# Patient Record
Sex: MALE | Race: WHITE | NOT HISPANIC OR LATINO | Employment: OTHER | URBAN - METROPOLITAN AREA
[De-identification: names, ages, dates, MRNs, and addresses within clinical notes are randomized per-mention and may not be internally consistent; named-entity substitution may affect disease eponyms.]

---

## 2017-02-03 ENCOUNTER — ALLSCRIPTS OFFICE VISIT (OUTPATIENT)
Dept: OTHER | Facility: OTHER | Age: 62
End: 2017-02-03

## 2017-06-28 ENCOUNTER — TRANSCRIBE ORDERS (OUTPATIENT)
Dept: LAB | Facility: CLINIC | Age: 62
End: 2017-06-28

## 2017-06-28 ENCOUNTER — APPOINTMENT (OUTPATIENT)
Dept: LAB | Facility: CLINIC | Age: 62
End: 2017-06-28
Payer: COMMERCIAL

## 2017-06-28 DIAGNOSIS — E55.9 UNSPECIFIED VITAMIN D DEFICIENCY: ICD-10-CM

## 2017-06-28 DIAGNOSIS — Z79.899 LONG TERM USE OF DRUG: ICD-10-CM

## 2017-06-28 DIAGNOSIS — R63.5 WEIGHT GAIN: ICD-10-CM

## 2017-06-28 DIAGNOSIS — E78.5 HYPERLIPIDEMIA, UNSPECIFIED HYPERLIPIDEMIA TYPE: ICD-10-CM

## 2017-06-28 DIAGNOSIS — I15.9 SECONDARY HYPERTENSION: ICD-10-CM

## 2017-06-28 DIAGNOSIS — Z79.899 ENCOUNTER FOR LONG-TERM (CURRENT) USE OF OTHER MEDICATIONS: ICD-10-CM

## 2017-06-28 DIAGNOSIS — I15.9 SECONDARY HYPERTENSION: Primary | ICD-10-CM

## 2017-06-28 LAB
25(OH)D3 SERPL-MCNC: 33.1 NG/ML (ref 30–100)
ALBUMIN SERPL BCP-MCNC: 3.6 G/DL (ref 3.5–5)
ALP SERPL-CCNC: 92 U/L (ref 46–116)
ALT SERPL W P-5'-P-CCNC: 39 U/L (ref 12–78)
ANION GAP SERPL CALCULATED.3IONS-SCNC: 6 MMOL/L (ref 4–13)
AST SERPL W P-5'-P-CCNC: 20 U/L (ref 5–45)
BILIRUB SERPL-MCNC: 0.51 MG/DL (ref 0.2–1)
BUN SERPL-MCNC: 13 MG/DL (ref 5–25)
CALCIUM SERPL-MCNC: 8.8 MG/DL (ref 8.3–10.1)
CHLORIDE SERPL-SCNC: 111 MMOL/L (ref 100–108)
CHOLEST SERPL-MCNC: 127 MG/DL (ref 50–200)
CO2 SERPL-SCNC: 25 MMOL/L (ref 21–32)
CREAT SERPL-MCNC: 0.8 MG/DL (ref 0.6–1.3)
ERYTHROCYTE [DISTWIDTH] IN BLOOD BY AUTOMATED COUNT: 12.5 % (ref 11.6–15.1)
GFR SERPL CREATININE-BSD FRML MDRD: >60 ML/MIN/1.73SQ M
GLUCOSE P FAST SERPL-MCNC: 91 MG/DL (ref 65–99)
HCT VFR BLD AUTO: 46.2 % (ref 36.5–49.3)
HDLC SERPL-MCNC: 31 MG/DL (ref 40–60)
HGB BLD-MCNC: 16.2 G/DL (ref 12–17)
LDLC SERPL CALC-MCNC: 60 MG/DL (ref 0–100)
MCH RBC QN AUTO: 31 PG (ref 26.8–34.3)
MCHC RBC AUTO-ENTMCNC: 35.1 G/DL (ref 31.4–37.4)
MCV RBC AUTO: 89 FL (ref 82–98)
PLATELET # BLD AUTO: 184 THOUSANDS/UL (ref 149–390)
PMV BLD AUTO: 11.1 FL (ref 8.9–12.7)
POTASSIUM SERPL-SCNC: 3.8 MMOL/L (ref 3.5–5.3)
PROT SERPL-MCNC: 7 G/DL (ref 6.4–8.2)
RBC # BLD AUTO: 5.22 MILLION/UL (ref 3.88–5.62)
SODIUM SERPL-SCNC: 142 MMOL/L (ref 136–145)
T4 FREE SERPL-MCNC: 0.97 NG/DL (ref 0.76–1.46)
TRIGL SERPL-MCNC: 181 MG/DL
TSH SERPL DL<=0.05 MIU/L-ACNC: 2.46 UIU/ML (ref 0.36–3.74)
WBC # BLD AUTO: 6.73 THOUSAND/UL (ref 4.31–10.16)

## 2017-06-28 PROCEDURE — 80061 LIPID PANEL: CPT

## 2017-06-28 PROCEDURE — 84443 ASSAY THYROID STIM HORMONE: CPT

## 2017-06-28 PROCEDURE — 36415 COLL VENOUS BLD VENIPUNCTURE: CPT

## 2017-06-28 PROCEDURE — 85027 COMPLETE CBC AUTOMATED: CPT

## 2017-06-28 PROCEDURE — 80053 COMPREHEN METABOLIC PANEL: CPT

## 2017-06-28 PROCEDURE — 82306 VITAMIN D 25 HYDROXY: CPT

## 2017-06-28 PROCEDURE — 84439 ASSAY OF FREE THYROXINE: CPT

## 2018-01-13 VITALS
BODY MASS INDEX: 35.84 KG/M2 | HEART RATE: 84 BPM | WEIGHT: 256 LBS | DIASTOLIC BLOOD PRESSURE: 68 MMHG | RESPIRATION RATE: 16 BRPM | HEIGHT: 71 IN | SYSTOLIC BLOOD PRESSURE: 122 MMHG | TEMPERATURE: 98.1 F

## 2018-01-13 NOTE — PROGRESS NOTES
Assessment    1  Allergic rhinitis (477 9) (J30 9)   2  Encounter for preventive health examination (V70 0) (Z00 00)    Plan  Allergic rhinitis    · Montelukast Sodium 10 MG Oral Tablet (Singulair); TAKE 1 TABLET DAILY  Asthma    · Advair Diskus 250-50 MCG/DOSE Inhalation Aerosol Powder Breath Activated;  INHALE 1 PUFF TWICE DAILY  RINSE MOUTH AFTER USE   · Proventil  (90 Base) MCG/ACT Inhalation Aerosol Solution; INHALE 1 TO 2  PUFFS EVERY 4 TO 6 HOURS AS NEEDED    Discussion/Summary  Impression: health maintenance visit  [facility] physical and cleared to drive  Chief Complaint  Pt presents for [facility] Physical  Eye exam: both 20/20, left 20/20, right 20/30  History of Present Illness  HM, Adult Male: The patient is being seen for a health maintenance evaluation  The last health maintenance visit was 12 month(s) ago  General Health:   Screening:   HPI: The patient is here for a [facility] physical      Review of Systems    Constitutional: No fever or chills, feels well, no tiredness, no recent weight gain or weight loss  Eyes: No complaints of eye pain, no red eyes, no discharge from eyes, no itchy eyes  ENT: no complaints of earache, no hearing loss, no nosebleeds, no nasal discharge, no sore throat, no hoarseness  Cardiovascular: No complaints of slow heart rate, no fast heart rate, no chest pain, no palpitations, no leg claudication, no lower extremity  Respiratory: No complaints of shortness of breath, no wheezing, no cough, no SOB on exertion, no orthopnea or PND  Gastrointestinal: No complaints of abdominal pain, no constipation, no nausea or vomiting, no diarrhea or bloody stools  Genitourinary: No complaints of dysuria, no incontinence, no hesitancy, no nocturia, no genital lesion, no testicular pain  Musculoskeletal: No complaints of arthralgia, no myalgias, no joint swelling or stiffness, no limb pain or swelling     Integumentary: No complaints of skin rash or skin lesions, no itching, no skin wound, no dry skin  Neurological: No compliants of headache, no confusion, no convulsions, no numbness or tingling, no dizziness or fainting, no limb weakness, no difficulty walking  Psychiatric: Is not suicidal, no sleep disturbances, no anxiety or depression, no change in personality, no emotional problems  Endocrine: No complaints of proptosis, no hot flashes, no muscle weakness, no erectile dysfunction, no deepening of the voice, no feelings of weakness  Hematologic/Lymphatic: No complaints of swollen glands, no swollen glands in the neck, does not bleed easily, no easy bruising  Active Problems    1  Asthma (493 90) (J45 909)   2  Essential hypertension (401 9) (I10)   3  Heart disease (429 9) (I51 9)   4  Hypercholesteremia (272 0) (E78 00)    Past Medical History    · History of Heart attack (410 90) (I21 3)    Surgical History    · History of Coronary Artery Triple Arterial Bypass Graft   · History of Knee Replacement   · History of Umbilical Hernia Repair    Family History  Mother    · No pertinent family history    Social History    · Never a smoker    Current Meds   1  Aspirin 81 MG TABS; TAKE 1 TABLET DAILY; Therapy: [id number] to Recorded   2  Atorvastatin Calcium 40 MG Oral Tablet; TAKE 1 TABLET DAILY AS DIRECTED; Therapy: [id number] to Recorded   3  Metoprolol Succinate ER 25 MG Oral Tablet Extended Release 24 Hour; Therapy: [id number] to (Evaluate:18Mar2015) Recorded    Allergies    1  Biaxin TABS   2  Penicillins   3  Sulfa Drugs    Vitals   Recorded: [id number] 01:47PM   Temperature 98 1 F   Heart Rate 84   Respiration 16   Systolic 985   Diastolic 68   Height 5 ft 11 in   Weight 256 lb    BMI Calculated 35 7   BSA Calculated 2 34     Physical Exam    Constitutional   General appearance: No acute distress, well appearing and well nourished  Head and Face   Head and face: Normal     Eyes   Conjunctiva and lids: No erythema, swelling or discharge      Ears, Nose, Mouth, and Throat   External inspection of ears and nose: Normal     Otoscopic examination: Tympanic membranes translucent with normal light reflex  Canals patent without erythema  Hearing: Normal     Nasal mucosa, septum, and turbinates: Normal without edema or erythema  Oropharynx: Normal with no erythema, edema, exudate or lesions  Neck   Neck: Supple, symmetric, trachea midline, no masses  Thyroid: Normal, no thyromegaly  Pulmonary   Respiratory effort: No increased work of breathing or signs of respiratory distress  Auscultation of lungs: Clear to auscultation  Cardiovascular   Auscultation of heart: Normal rate and rhythm, normal S1 and S2, no murmurs  Carotid pulses: 2+ bilaterally  Abdominal aorta: Normal     Femoral pulses: 2+ bilaterally  Pedal pulses: 2+ bilaterally  Examination of extremities for edema and/or varicosities: Normal     Chest   Breasts: Normal, no dimpling or skin changes appreciated  Abdomen   Abdomen: Non-tender, no masses  Liver and spleen: No hepatomegaly or splenomegaly  Stool sample for occult blood: Negative  Genitourinary   Scrotal contents: Normal testes, no masses  Lymphatic   Palpation of lymph nodes in neck: No lymphadenopathy  Musculoskeletal   Gait and station: Normal     Inspection/palpation of digits and nails: Normal without clubbing or cyanosis  Inspection/palpation of joints, bones, and muscles: Normal     Muscle strength/tone: Normal     Skin   Skin and subcutaneous tissue: Normal without rashes or lesions  Palpation of skin and subcutaneous tissue: Normal turgor  Neurologic   Cranial nerves: Cranial nerves 2-12 intact  Reflexes: 2+ and symmetric  Sensation: No sensory loss      Psychiatric   Judgment and insight: Normal     Mood and affect: Normal        Signatures   Electronically signed by : Maria E Weiss DO; Feb  3 2017  2:15PM EST                       (Author)

## 2018-10-25 ENCOUNTER — APPOINTMENT (OUTPATIENT)
Dept: LAB | Facility: HOSPITAL | Age: 63
End: 2018-10-25
Attending: INTERNAL MEDICINE
Payer: COMMERCIAL

## 2018-10-25 ENCOUNTER — TRANSCRIBE ORDERS (OUTPATIENT)
Dept: ADMINISTRATIVE | Facility: HOSPITAL | Age: 63
End: 2018-10-25

## 2018-10-25 DIAGNOSIS — R68.89 RECENT CHANGE IN WEIGHT: ICD-10-CM

## 2018-10-25 DIAGNOSIS — R73.9 HYPERGLYCEMIA: ICD-10-CM

## 2018-10-25 DIAGNOSIS — Z79.82 ASPIRIN LONG-TERM USE: ICD-10-CM

## 2018-10-25 DIAGNOSIS — Z79.899 LONG TERM USE OF DRUG: ICD-10-CM

## 2018-10-25 DIAGNOSIS — I10 ESSENTIAL HYPERTENSION: ICD-10-CM

## 2018-10-25 DIAGNOSIS — I10 ESSENTIAL HYPERTENSION: Primary | ICD-10-CM

## 2018-10-25 LAB
ALBUMIN SERPL BCP-MCNC: 4.2 G/DL (ref 3.5–5.7)
ALP SERPL-CCNC: 90 U/L (ref 55–165)
ALT SERPL W P-5'-P-CCNC: 38 U/L (ref 7–52)
ANION GAP SERPL CALCULATED.3IONS-SCNC: 9 MMOL/L (ref 4–13)
AST SERPL W P-5'-P-CCNC: 22 U/L (ref 13–39)
BILIRUB SERPL-MCNC: 0.8 MG/DL (ref 0.2–1)
BUN SERPL-MCNC: 13 MG/DL (ref 7–25)
CALCIUM SERPL-MCNC: 9.2 MG/DL (ref 8.6–10.5)
CHLORIDE SERPL-SCNC: 107 MMOL/L (ref 98–107)
CHOLEST SERPL-MCNC: 107 MG/DL (ref 0–200)
CO2 SERPL-SCNC: 23 MMOL/L (ref 21–31)
CREAT SERPL-MCNC: 0.83 MG/DL (ref 0.7–1.3)
ERYTHROCYTE [DISTWIDTH] IN BLOOD BY AUTOMATED COUNT: 14.3 % (ref 11.6–15.1)
EST. AVERAGE GLUCOSE BLD GHB EST-MCNC: 103 MG/DL
GFR SERPL CREATININE-BSD FRML MDRD: 93 ML/MIN/1.73SQ M
GLUCOSE P FAST SERPL-MCNC: 108 MG/DL (ref 65–99)
HBA1C MFR BLD: 5.2 % (ref 4.2–6.3)
HCT VFR BLD AUTO: 47.6 % (ref 42–52)
HDLC SERPL-MCNC: 36 MG/DL (ref 40–60)
HGB BLD-MCNC: 16.2 G/DL (ref 12–17)
LDLC SERPL CALC-MCNC: 41 MG/DL (ref 0–100)
MCH RBC QN AUTO: 31.1 PG (ref 26.8–34.3)
MCHC RBC AUTO-ENTMCNC: 34.1 G/DL (ref 31.4–37.4)
MCV RBC AUTO: 91 FL (ref 82–98)
NONHDLC SERPL-MCNC: 71 MG/DL
PLATELET # BLD AUTO: 191 THOUSANDS/UL (ref 149–390)
PMV BLD AUTO: 8.5 FL (ref 8.9–12.7)
POTASSIUM SERPL-SCNC: 3.9 MMOL/L (ref 3.5–5.5)
PROT SERPL-MCNC: 7.1 G/DL (ref 6.4–8.9)
RBC # BLD AUTO: 5.22 MILLION/UL (ref 3.88–5.62)
SODIUM SERPL-SCNC: 139 MMOL/L (ref 134–143)
T4 FREE SERPL-MCNC: 1 NG/DL (ref 0.76–1.46)
TRIGL SERPL-MCNC: 151 MG/DL (ref 44–166)
TSH SERPL DL<=0.05 MIU/L-ACNC: 3.05 UIU/ML (ref 0.45–5.33)
WBC # BLD AUTO: 7.2 THOUSAND/UL (ref 4.31–10.16)

## 2018-10-25 PROCEDURE — 85027 COMPLETE CBC AUTOMATED: CPT

## 2018-10-25 PROCEDURE — 84443 ASSAY THYROID STIM HORMONE: CPT

## 2018-10-25 PROCEDURE — 80061 LIPID PANEL: CPT

## 2018-10-25 PROCEDURE — 36415 COLL VENOUS BLD VENIPUNCTURE: CPT

## 2018-10-25 PROCEDURE — 83036 HEMOGLOBIN GLYCOSYLATED A1C: CPT

## 2018-10-25 PROCEDURE — 80053 COMPREHEN METABOLIC PANEL: CPT

## 2018-10-25 PROCEDURE — 84439 ASSAY OF FREE THYROXINE: CPT

## 2020-01-23 ENCOUNTER — APPOINTMENT (OUTPATIENT)
Dept: LAB | Facility: HOSPITAL | Age: 65
End: 2020-01-23
Attending: INTERNAL MEDICINE
Payer: MEDICARE

## 2020-01-23 ENCOUNTER — TRANSCRIBE ORDERS (OUTPATIENT)
Dept: ADMINISTRATIVE | Facility: HOSPITAL | Age: 65
End: 2020-01-23

## 2020-01-23 DIAGNOSIS — E55.9 VITAMIN D DEFICIENCY: ICD-10-CM

## 2020-01-23 DIAGNOSIS — Z79.01 LONG TERM (CURRENT) USE OF ANTICOAGULANTS: ICD-10-CM

## 2020-01-23 DIAGNOSIS — I10 ESSENTIAL HYPERTENSION: Primary | ICD-10-CM

## 2020-01-23 DIAGNOSIS — R10.9 ABDOMINAL PAIN, UNSPECIFIED ABDOMINAL LOCATION: ICD-10-CM

## 2020-01-23 DIAGNOSIS — R73.9 HYPERGLYCEMIA: ICD-10-CM

## 2020-01-23 DIAGNOSIS — I25.10 ASCVD (ARTERIOSCLEROTIC CARDIOVASCULAR DISEASE): ICD-10-CM

## 2020-01-23 DIAGNOSIS — E78.2 MIXED HYPERLIPIDEMIA: ICD-10-CM

## 2020-01-23 DIAGNOSIS — N42.9 PROSTATE DISORDER: ICD-10-CM

## 2020-01-23 DIAGNOSIS — I10 ESSENTIAL HYPERTENSION: ICD-10-CM

## 2020-01-23 LAB
25(OH)D3 SERPL-MCNC: 26.7 NG/ML (ref 30–100)
ALBUMIN SERPL BCP-MCNC: 3.6 G/DL (ref 3.5–5)
ALP SERPL-CCNC: 87 U/L (ref 46–116)
ALT SERPL W P-5'-P-CCNC: 31 U/L (ref 12–78)
ANION GAP SERPL CALCULATED.3IONS-SCNC: 4 MMOL/L (ref 4–13)
AST SERPL W P-5'-P-CCNC: 17 U/L (ref 5–45)
BACTERIA UR QL AUTO: ABNORMAL /HPF
BASOPHILS # BLD AUTO: 0.07 THOUSANDS/ΜL (ref 0–0.1)
BASOPHILS NFR BLD AUTO: 1 % (ref 0–1)
BILIRUB SERPL-MCNC: 0.72 MG/DL (ref 0.2–1)
BILIRUB UR QL STRIP: NEGATIVE
BUN SERPL-MCNC: 8 MG/DL (ref 5–25)
CALCIUM SERPL-MCNC: 9.6 MG/DL (ref 8.3–10.1)
CHLORIDE SERPL-SCNC: 107 MMOL/L (ref 100–108)
CHOLEST SERPL-MCNC: 226 MG/DL (ref 50–200)
CLARITY UR: CLEAR
CO2 SERPL-SCNC: 27 MMOL/L (ref 21–32)
COLOR UR: ABNORMAL
CREAT SERPL-MCNC: 1.02 MG/DL (ref 0.6–1.3)
EOSINOPHIL # BLD AUTO: 0.2 THOUSAND/ΜL (ref 0–0.61)
EOSINOPHIL NFR BLD AUTO: 3 % (ref 0–6)
ERYTHROCYTE [DISTWIDTH] IN BLOOD BY AUTOMATED COUNT: 12.9 % (ref 11.6–15.1)
EST. AVERAGE GLUCOSE BLD GHB EST-MCNC: 111 MG/DL
GFR SERPL CREATININE-BSD FRML MDRD: 77 ML/MIN/1.73SQ M
GLUCOSE P FAST SERPL-MCNC: 103 MG/DL (ref 65–99)
GLUCOSE UR STRIP-MCNC: NEGATIVE MG/DL
HBA1C MFR BLD: 5.5 % (ref 4.2–6.3)
HCT VFR BLD AUTO: 49.3 % (ref 36.5–49.3)
HDLC SERPL-MCNC: 36 MG/DL
HGB BLD-MCNC: 16.4 G/DL (ref 12–17)
HGB UR QL STRIP.AUTO: NEGATIVE
HYALINE CASTS #/AREA URNS LPF: ABNORMAL /LPF
IMM GRANULOCYTES # BLD AUTO: 0.02 THOUSAND/UL (ref 0–0.2)
IMM GRANULOCYTES NFR BLD AUTO: 0 % (ref 0–2)
KETONES UR STRIP-MCNC: NEGATIVE MG/DL
LDLC SERPL CALC-MCNC: 142 MG/DL (ref 0–100)
LEUKOCYTE ESTERASE UR QL STRIP: NEGATIVE
LIPASE SERPL-CCNC: 114 U/L (ref 73–393)
LYMPHOCYTES # BLD AUTO: 1.61 THOUSANDS/ΜL (ref 0.6–4.47)
LYMPHOCYTES NFR BLD AUTO: 26 % (ref 14–44)
MCH RBC QN AUTO: 29.9 PG (ref 26.8–34.3)
MCHC RBC AUTO-ENTMCNC: 33.3 G/DL (ref 31.4–37.4)
MCV RBC AUTO: 90 FL (ref 82–98)
MONOCYTES # BLD AUTO: 0.75 THOUSAND/ΜL (ref 0.17–1.22)
MONOCYTES NFR BLD AUTO: 12 % (ref 4–12)
NEUTROPHILS # BLD AUTO: 3.64 THOUSANDS/ΜL (ref 1.85–7.62)
NEUTS SEG NFR BLD AUTO: 58 % (ref 43–75)
NITRITE UR QL STRIP: NEGATIVE
NON-SQ EPI CELLS URNS QL MICRO: ABNORMAL /HPF
NONHDLC SERPL-MCNC: 190 MG/DL
NRBC BLD AUTO-RTO: 0 /100 WBCS
PH UR STRIP.AUTO: 6.5 [PH]
PLATELET # BLD AUTO: 194 THOUSANDS/UL (ref 149–390)
PMV BLD AUTO: 10 FL (ref 8.9–12.7)
POTASSIUM SERPL-SCNC: 4.1 MMOL/L (ref 3.5–5.3)
PROT SERPL-MCNC: 7.7 G/DL (ref 6.4–8.2)
PROT UR STRIP-MCNC: ABNORMAL MG/DL
PSA SERPL-MCNC: 1.5 NG/ML (ref 0–4)
RBC # BLD AUTO: 5.48 MILLION/UL (ref 3.88–5.62)
RBC #/AREA URNS AUTO: ABNORMAL /HPF
SODIUM SERPL-SCNC: 138 MMOL/L (ref 136–145)
SP GR UR STRIP.AUTO: 1.02 (ref 1–1.03)
T4 FREE SERPL-MCNC: 0.99 NG/DL (ref 0.76–1.46)
TRIGL SERPL-MCNC: 238 MG/DL
TSH SERPL DL<=0.05 MIU/L-ACNC: 5.45 UIU/ML (ref 0.36–3.74)
UROBILINOGEN UR QL STRIP.AUTO: 0.2 E.U./DL
WBC # BLD AUTO: 6.29 THOUSAND/UL (ref 4.31–10.16)
WBC #/AREA URNS AUTO: ABNORMAL /HPF

## 2020-01-23 PROCEDURE — 84439 ASSAY OF FREE THYROXINE: CPT

## 2020-01-23 PROCEDURE — G0103 PSA SCREENING: HCPCS

## 2020-01-23 PROCEDURE — 85025 COMPLETE CBC W/AUTO DIFF WBC: CPT

## 2020-01-23 PROCEDURE — 83690 ASSAY OF LIPASE: CPT

## 2020-01-23 PROCEDURE — 81001 URINALYSIS AUTO W/SCOPE: CPT | Performed by: INTERNAL MEDICINE

## 2020-01-23 PROCEDURE — 82306 VITAMIN D 25 HYDROXY: CPT

## 2020-01-23 PROCEDURE — 84443 ASSAY THYROID STIM HORMONE: CPT

## 2020-01-23 PROCEDURE — 80061 LIPID PANEL: CPT

## 2020-01-23 PROCEDURE — 87086 URINE CULTURE/COLONY COUNT: CPT

## 2020-01-23 PROCEDURE — 36415 COLL VENOUS BLD VENIPUNCTURE: CPT

## 2020-01-23 PROCEDURE — 83036 HEMOGLOBIN GLYCOSYLATED A1C: CPT

## 2020-01-23 PROCEDURE — 80053 COMPREHEN METABOLIC PANEL: CPT

## 2020-01-24 ENCOUNTER — TRANSCRIBE ORDERS (OUTPATIENT)
Dept: ADMINISTRATIVE | Facility: HOSPITAL | Age: 65
End: 2020-01-24

## 2020-01-24 DIAGNOSIS — R10.9 ABDOMINAL PAIN, UNSPECIFIED ABDOMINAL LOCATION: ICD-10-CM

## 2020-01-24 DIAGNOSIS — R14.0 BLOATING: Primary | ICD-10-CM

## 2020-01-24 LAB — BACTERIA UR CULT: NORMAL

## 2020-01-30 ENCOUNTER — HOSPITAL ENCOUNTER (OUTPATIENT)
Dept: CT IMAGING | Facility: HOSPITAL | Age: 65
Discharge: HOME | End: 2020-01-30
Attending: INTERNAL MEDICINE
Payer: MEDICARE

## 2020-01-30 DIAGNOSIS — R10.9 ABDOMINAL PAIN, UNSPECIFIED ABDOMINAL LOCATION: ICD-10-CM

## 2020-01-30 DIAGNOSIS — R14.0 BLOATING: ICD-10-CM

## 2020-01-30 PROCEDURE — 74177 CT ABD & PELVIS W/CONTRAST: CPT

## 2020-01-30 RX ADMIN — IOHEXOL 100 ML: 350 INJECTION, SOLUTION INTRAVENOUS at 07:51

## 2020-03-12 ENCOUNTER — APPOINTMENT (OUTPATIENT)
Dept: LAB | Facility: CLINIC | Age: 65
End: 2020-03-12
Payer: MEDICARE

## 2020-03-12 ENCOUNTER — TRANSCRIBE ORDERS (OUTPATIENT)
Dept: URGENT CARE | Facility: CLINIC | Age: 65
End: 2020-03-12

## 2020-03-12 DIAGNOSIS — E55.9 VITAMIN D DEFICIENCY: ICD-10-CM

## 2020-03-12 DIAGNOSIS — E03.9 HYPOTHYROIDISM, UNSPECIFIED TYPE: Primary | ICD-10-CM

## 2020-03-12 DIAGNOSIS — E03.9 HYPOTHYROIDISM, UNSPECIFIED TYPE: ICD-10-CM

## 2020-03-12 LAB
25(OH)D3 SERPL-MCNC: 43.7 NG/ML (ref 30–100)
T4 FREE SERPL-MCNC: 1.2 NG/DL (ref 0.76–1.46)
TSH SERPL DL<=0.05 MIU/L-ACNC: 3.53 UIU/ML (ref 0.36–3.74)

## 2020-03-12 PROCEDURE — 82306 VITAMIN D 25 HYDROXY: CPT

## 2020-03-12 PROCEDURE — 84443 ASSAY THYROID STIM HORMONE: CPT

## 2020-03-12 PROCEDURE — 84439 ASSAY OF FREE THYROXINE: CPT

## 2020-03-12 PROCEDURE — 36415 COLL VENOUS BLD VENIPUNCTURE: CPT

## 2020-05-05 ENCOUNTER — TRANSCRIBE ORDERS (OUTPATIENT)
Dept: ADMINISTRATIVE | Facility: HOSPITAL | Age: 65
End: 2020-05-05

## 2020-05-05 DIAGNOSIS — R10.9 ABDOMINAL PAIN, UNSPECIFIED ABDOMINAL LOCATION: Primary | ICD-10-CM

## 2020-05-14 ENCOUNTER — HOSPITAL ENCOUNTER (OUTPATIENT)
Dept: MRI IMAGING | Facility: HOSPITAL | Age: 65
Discharge: HOME | End: 2020-05-14
Attending: INTERNAL MEDICINE
Payer: MEDICARE

## 2020-05-14 ENCOUNTER — HOSPITAL ENCOUNTER (OUTPATIENT)
Dept: MRI IMAGING | Facility: HOSPITAL | Age: 65
Discharge: HOME | End: 2020-05-14
Payer: MEDICARE

## 2020-05-14 DIAGNOSIS — R10.9 ABDOMINAL PAIN, UNSPECIFIED ABDOMINAL LOCATION: ICD-10-CM

## 2020-05-14 DIAGNOSIS — IMO0001 ASYMMETRICAL HEARING LOSS OF LEFT EAR: ICD-10-CM

## 2020-05-14 PROCEDURE — 74183 MRI ABD W/O CNTR FLWD CNTR: CPT

## 2020-05-14 PROCEDURE — A9585 GADOBUTROL INJECTION: HCPCS | Performed by: INTERNAL MEDICINE

## 2020-05-14 RX ADMIN — GADOBUTROL 11 ML: 604.72 INJECTION INTRAVENOUS at 15:28

## 2020-05-15 ENCOUNTER — HOSPITAL ENCOUNTER (OUTPATIENT)
Dept: MRI IMAGING | Facility: HOSPITAL | Age: 65
Discharge: HOME | End: 2020-05-15
Payer: MEDICARE

## 2020-05-15 PROCEDURE — 70553 MRI BRAIN STEM W/O & W/DYE: CPT

## 2020-05-15 PROCEDURE — A9585 GADOBUTROL INJECTION: HCPCS | Performed by: SPECIALIST

## 2020-05-15 RX ADMIN — GADOBUTROL 11 ML: 604.72 INJECTION INTRAVENOUS at 16:01

## 2020-06-12 ENCOUNTER — DOCUMENTATION (OUTPATIENT)
Dept: OTHER | Facility: HOSPITAL | Age: 65
End: 2020-06-12

## 2020-06-12 DIAGNOSIS — Z20.822 ENCOUNTER FOR LABORATORY TESTING FOR SEVERE ACUTE RESPIRATORY SYNDROME CORONAVIRUS 2 (SARS-COV-2): Primary | ICD-10-CM

## 2020-06-19 DIAGNOSIS — Z20.822 ENCOUNTER FOR LABORATORY TESTING FOR SEVERE ACUTE RESPIRATORY SYNDROME CORONAVIRUS 2 (SARS-COV-2): ICD-10-CM

## 2020-06-19 PROCEDURE — U0003 INFECTIOUS AGENT DETECTION BY NUCLEIC ACID (DNA OR RNA); SEVERE ACUTE RESPIRATORY SYNDROME CORONAVIRUS 2 (SARS-COV-2) (CORONAVIRUS DISEASE [COVID-19]), AMPLIFIED PROBE TECHNIQUE, MAKING USE OF HIGH THROUGHPUT TECHNOLOGIES AS DESCRIBED BY CMS-2020-01-R: HCPCS

## 2020-06-22 LAB — SARS-COV-2 RNA SPEC QL NAA+PROBE: NOT DETECTED

## 2020-06-24 ENCOUNTER — ANESTHESIA EVENT (OUTPATIENT)
Dept: GASTROENTEROLOGY | Facility: HOSPITAL | Age: 65
End: 2020-06-24

## 2020-06-25 ENCOUNTER — HOSPITAL ENCOUNTER (OUTPATIENT)
Dept: GASTROENTEROLOGY | Facility: HOSPITAL | Age: 65
Setting detail: OUTPATIENT SURGERY
Discharge: HOME | End: 2020-06-25
Attending: INTERNAL MEDICINE | Admitting: INTERNAL MEDICINE
Payer: MEDICARE

## 2020-06-25 ENCOUNTER — ANESTHESIA (OUTPATIENT)
Dept: GASTROENTEROLOGY | Facility: HOSPITAL | Age: 65
End: 2020-06-25

## 2020-06-25 VITALS
RESPIRATION RATE: 18 BRPM | SYSTOLIC BLOOD PRESSURE: 112 MMHG | TEMPERATURE: 98.1 F | HEART RATE: 85 BPM | DIASTOLIC BLOOD PRESSURE: 58 MMHG | OXYGEN SATURATION: 97 %

## 2020-06-25 DIAGNOSIS — R19.4 CHANGE IN BOWEL HABIT: ICD-10-CM

## 2020-06-25 DIAGNOSIS — R10.84 GENERALIZED ABDOMINAL PAIN: ICD-10-CM

## 2020-06-25 PROCEDURE — 88305 TISSUE EXAM BY PATHOLOGIST: CPT | Performed by: PATHOLOGY

## 2020-06-25 RX ORDER — LIDOCAINE HYDROCHLORIDE 10 MG/ML
0.5 INJECTION, SOLUTION EPIDURAL; INFILTRATION; INTRACAUDAL; PERINEURAL ONCE AS NEEDED
Status: DISCONTINUED | OUTPATIENT
Start: 2020-06-25 | End: 2020-06-29 | Stop reason: HOSPADM

## 2020-06-25 RX ORDER — SODIUM CHLORIDE, SODIUM LACTATE, POTASSIUM CHLORIDE, CALCIUM CHLORIDE 600; 310; 30; 20 MG/100ML; MG/100ML; MG/100ML; MG/100ML
125 INJECTION, SOLUTION INTRAVENOUS CONTINUOUS
Status: DISCONTINUED | OUTPATIENT
Start: 2020-06-25 | End: 2020-06-29 | Stop reason: HOSPADM

## 2020-06-25 RX ORDER — SODIUM CHLORIDE, SODIUM LACTATE, POTASSIUM CHLORIDE, CALCIUM CHLORIDE 600; 310; 30; 20 MG/100ML; MG/100ML; MG/100ML; MG/100ML
125 INJECTION, SOLUTION INTRAVENOUS CONTINUOUS
Status: CANCELLED | OUTPATIENT
Start: 2020-06-25

## 2020-06-25 RX ORDER — EPHEDRINE SULFATE 50 MG/ML
INJECTION INTRAVENOUS AS NEEDED
Status: DISCONTINUED | OUTPATIENT
Start: 2020-06-25 | End: 2020-06-25 | Stop reason: SURG

## 2020-06-25 RX ORDER — LIDOCAINE HYDROCHLORIDE 20 MG/ML
INJECTION, SOLUTION EPIDURAL; INFILTRATION; INTRACAUDAL; PERINEURAL AS NEEDED
Status: DISCONTINUED | OUTPATIENT
Start: 2020-06-25 | End: 2020-06-25 | Stop reason: SURG

## 2020-06-25 RX ORDER — PROPOFOL 10 MG/ML
INJECTION, EMULSION INTRAVENOUS AS NEEDED
Status: DISCONTINUED | OUTPATIENT
Start: 2020-06-25 | End: 2020-06-25 | Stop reason: SURG

## 2020-06-25 RX ADMIN — PROPOFOL 30 MG: 10 INJECTION, EMULSION INTRAVENOUS at 10:38

## 2020-06-25 RX ADMIN — PROPOFOL 30 MG: 10 INJECTION, EMULSION INTRAVENOUS at 10:45

## 2020-06-25 RX ADMIN — PROPOFOL 30 MG: 10 INJECTION, EMULSION INTRAVENOUS at 10:53

## 2020-06-25 RX ADMIN — PROPOFOL 30 MG: 10 INJECTION, EMULSION INTRAVENOUS at 10:50

## 2020-06-25 RX ADMIN — EPHEDRINE SULFATE 10 MG: 50 INJECTION, SOLUTION INTRAVENOUS at 10:52

## 2020-06-25 RX ADMIN — SODIUM CHLORIDE, SODIUM LACTATE, POTASSIUM CHLORIDE, AND CALCIUM CHLORIDE 125 ML/HR: .6; .31; .03; .02 INJECTION, SOLUTION INTRAVENOUS at 08:34

## 2020-06-25 RX ADMIN — PROPOFOL 30 MG: 10 INJECTION, EMULSION INTRAVENOUS at 11:06

## 2020-06-25 RX ADMIN — PROPOFOL 30 MG: 10 INJECTION, EMULSION INTRAVENOUS at 10:57

## 2020-06-25 RX ADMIN — PROPOFOL 30 MG: 10 INJECTION, EMULSION INTRAVENOUS at 10:41

## 2020-06-25 RX ADMIN — PROPOFOL 30 MG: 10 INJECTION, EMULSION INTRAVENOUS at 11:01

## 2020-06-25 RX ADMIN — LIDOCAINE HYDROCHLORIDE 100 MG: 20 INJECTION, SOLUTION EPIDURAL; INFILTRATION; INTRACAUDAL; PERINEURAL at 10:35

## 2020-06-25 RX ADMIN — PROPOFOL 130 MG: 10 INJECTION, EMULSION INTRAVENOUS at 10:35

## 2020-06-25 RX ADMIN — PROPOFOL 30 MG: 10 INJECTION, EMULSION INTRAVENOUS at 10:48

## 2020-06-25 RX ADMIN — PROPOFOL 30 MG: 10 INJECTION, EMULSION INTRAVENOUS at 11:04

## 2020-06-25 RX ADMIN — PROPOFOL 30 MG: 10 INJECTION, EMULSION INTRAVENOUS at 10:43

## 2020-09-10 ENCOUNTER — ANESTHESIA EVENT (OUTPATIENT)
Dept: PERIOP | Facility: AMBULARY SURGERY CENTER | Age: 65
End: 2020-09-10
Payer: MEDICARE

## 2020-09-16 ENCOUNTER — TRANSCRIBE ORDERS (OUTPATIENT)
Dept: URGENT CARE | Facility: CLINIC | Age: 65
End: 2020-09-16

## 2020-09-16 ENCOUNTER — APPOINTMENT (OUTPATIENT)
Dept: RADIOLOGY | Facility: CLINIC | Age: 65
End: 2020-09-16
Payer: MEDICARE

## 2020-09-16 ENCOUNTER — LAB (OUTPATIENT)
Dept: LAB | Facility: CLINIC | Age: 65
End: 2020-09-16
Payer: MEDICARE

## 2020-09-16 DIAGNOSIS — G47.33 OBSTRUCTIVE SLEEP APNEA (ADULT) (PEDIATRIC): ICD-10-CM

## 2020-09-16 DIAGNOSIS — G47.33 OSA (OBSTRUCTIVE SLEEP APNEA): ICD-10-CM

## 2020-09-16 DIAGNOSIS — G47.33 OBSTRUCTIVE SLEEP APNEA (ADULT) (PEDIATRIC): Primary | ICD-10-CM

## 2020-09-16 LAB
ANION GAP SERPL CALCULATED.3IONS-SCNC: 11 MMOL/L (ref 4–13)
BUN SERPL-MCNC: 13 MG/DL (ref 5–25)
CALCIUM SERPL-MCNC: 9.3 MG/DL (ref 8.3–10.1)
CHLORIDE SERPL-SCNC: 107 MMOL/L (ref 100–108)
CO2 SERPL-SCNC: 21 MMOL/L (ref 21–32)
CREAT SERPL-MCNC: 1 MG/DL (ref 0.6–1.3)
ERYTHROCYTE [DISTWIDTH] IN BLOOD BY AUTOMATED COUNT: 13.2 % (ref 11.6–15.1)
GFR SERPL CREATININE-BSD FRML MDRD: 79 ML/MIN/1.73SQ M
GLUCOSE P FAST SERPL-MCNC: 81 MG/DL (ref 65–99)
HCT VFR BLD AUTO: 50 % (ref 36.5–49.3)
HGB BLD-MCNC: 17.3 G/DL (ref 12–17)
MCH RBC QN AUTO: 31.2 PG (ref 26.8–34.3)
MCHC RBC AUTO-ENTMCNC: 34.6 G/DL (ref 31.4–37.4)
MCV RBC AUTO: 90 FL (ref 82–98)
PLATELET # BLD AUTO: 222 THOUSANDS/UL (ref 149–390)
PMV BLD AUTO: 10 FL (ref 8.9–12.7)
POTASSIUM SERPL-SCNC: 4.2 MMOL/L (ref 3.5–5.3)
RBC # BLD AUTO: 5.55 MILLION/UL (ref 3.88–5.62)
SODIUM SERPL-SCNC: 139 MMOL/L (ref 136–145)
WBC # BLD AUTO: 7.81 THOUSAND/UL (ref 4.31–10.16)

## 2020-09-16 PROCEDURE — 36415 COLL VENOUS BLD VENIPUNCTURE: CPT

## 2020-09-16 PROCEDURE — 85027 COMPLETE CBC AUTOMATED: CPT

## 2020-09-16 PROCEDURE — 71046 X-RAY EXAM CHEST 2 VIEWS: CPT

## 2020-09-16 PROCEDURE — 80048 BASIC METABOLIC PNL TOTAL CA: CPT

## 2020-09-23 PROBLEM — IMO0001 SMOKING: Status: ACTIVE | Noted: 2020-09-23

## 2020-09-23 PROBLEM — I21.9 MI (MYOCARDIAL INFARCTION) (HCC): Status: ACTIVE | Noted: 2020-09-23

## 2020-09-23 PROBLEM — F17.200 SMOKING: Status: ACTIVE | Noted: 2020-09-23

## 2020-09-23 PROBLEM — G47.30 SLEEP APNEA: Status: ACTIVE | Noted: 2020-09-23

## 2020-09-23 PROBLEM — J45.909 ASTHMA: Status: ACTIVE | Noted: 2020-09-23

## 2020-09-23 NOTE — PRE-PROCEDURE INSTRUCTIONS
Pre-Surgery Instructions:   Medication Instructions    albuterol (PROVENTIL HFA) 90 mcg/act inhaler Instructed patient per Anesthesia Guidelines   aspirin 81 MG tablet Patient was instructed by Physician and understands   clopidogrel (PLAVIX) 75 mg tablet Patient was instructed by Physician and understands   fluticasone-salmeterol (ADVAIR DISKUS) 250-50 mcg/dose inhaler Instructed patient per Anesthesia Guidelines   metoprolol succinate (TOPROL XL) 25 mg 24 hr tablet Instructed patient per Anesthesia Guidelines   montelukast (SINGULAIR) 10 mg tablet Instructed patient per Anesthesia Guidelines   Multiple Vitamins-Minerals (VITAMIN D3 COMPLETE PO) Instructed patient per Anesthesia Guidelines   nitroglycerin (NITROSTAT) 0 4 mg SL tablet Instructed patient per Anesthesia Guidelines   OMEGA-3 FATTY ACIDS PO Instructed patient per Anesthesia Guidelines      Pre op,medications and showering instructions reviewed-Patient has hibiclens

## 2020-09-24 ENCOUNTER — HOSPITAL ENCOUNTER (OUTPATIENT)
Facility: AMBULARY SURGERY CENTER | Age: 65
Setting detail: OUTPATIENT SURGERY
Discharge: HOME | End: 2020-09-24
Attending: OTOLARYNGOLOGY | Admitting: OTOLARYNGOLOGY
Payer: MEDICARE

## 2020-09-24 ENCOUNTER — ANESTHESIA (OUTPATIENT)
Dept: PERIOP | Facility: AMBULARY SURGERY CENTER | Age: 65
End: 2020-09-24
Payer: MEDICARE

## 2020-09-24 VITALS
SYSTOLIC BLOOD PRESSURE: 142 MMHG | RESPIRATION RATE: 21 BRPM | HEIGHT: 71 IN | TEMPERATURE: 97.6 F | BODY MASS INDEX: 33.88 KG/M2 | OXYGEN SATURATION: 95 % | HEART RATE: 80 BPM | WEIGHT: 242 LBS | DIASTOLIC BLOOD PRESSURE: 85 MMHG

## 2020-09-24 VITALS — HEART RATE: 83 BPM

## 2020-09-24 PROBLEM — Z98.61 HISTORY OF PTCA: Status: ACTIVE | Noted: 2020-09-24

## 2020-09-24 PROBLEM — Z95.1 HISTORY OF CORONARY ARTERY BYPASS GRAFT: Status: ACTIVE | Noted: 2020-09-24

## 2020-09-24 PROCEDURE — 31575 DIAGNOSTIC LARYNGOSCOPY: CPT | Performed by: OTOLARYNGOLOGY

## 2020-09-24 RX ORDER — PROPOFOL 10 MG/ML
INJECTION, EMULSION INTRAVENOUS AS NEEDED
Status: DISCONTINUED | OUTPATIENT
Start: 2020-09-24 | End: 2020-09-24

## 2020-09-24 RX ORDER — PROPOFOL 10 MG/ML
INJECTION, EMULSION INTRAVENOUS CONTINUOUS PRN
Status: DISCONTINUED | OUTPATIENT
Start: 2020-09-24 | End: 2020-09-24

## 2020-09-24 RX ORDER — ATROPINE SULFATE 0.4 MG/ML
AMPUL (ML) INJECTION AS NEEDED
Status: DISCONTINUED | OUTPATIENT
Start: 2020-09-24 | End: 2020-09-24

## 2020-09-24 RX ORDER — OXYMETAZOLINE HYDROCHLORIDE 0.05 G/100ML
SPRAY NASAL AS NEEDED
Status: DISCONTINUED | OUTPATIENT
Start: 2020-09-24 | End: 2020-09-24 | Stop reason: HOSPADM

## 2020-09-24 RX ORDER — LIDOCAINE HYDROCHLORIDE 10 MG/ML
0.5 INJECTION, SOLUTION EPIDURAL; INFILTRATION; INTRACAUDAL; PERINEURAL ONCE AS NEEDED
Status: DISCONTINUED | OUTPATIENT
Start: 2020-09-24 | End: 2020-09-24 | Stop reason: HOSPADM

## 2020-09-24 RX ORDER — GLYCOPYRROLATE 0.2 MG/ML
INJECTION INTRAMUSCULAR; INTRAVENOUS AS NEEDED
Status: DISCONTINUED | OUTPATIENT
Start: 2020-09-24 | End: 2020-09-24

## 2020-09-24 RX ORDER — SODIUM CHLORIDE, SODIUM LACTATE, POTASSIUM CHLORIDE, CALCIUM CHLORIDE 600; 310; 30; 20 MG/100ML; MG/100ML; MG/100ML; MG/100ML
125 INJECTION, SOLUTION INTRAVENOUS CONTINUOUS
Status: DISCONTINUED | OUTPATIENT
Start: 2020-09-24 | End: 2020-09-24 | Stop reason: HOSPADM

## 2020-09-24 RX ADMIN — PROPOFOL 20 MG: 10 INJECTION, EMULSION INTRAVENOUS at 09:37

## 2020-09-24 RX ADMIN — GLYCOPYRROLATE 0.2 MG: 0.2 INJECTION, SOLUTION INTRAMUSCULAR; INTRAVENOUS at 09:20

## 2020-09-24 RX ADMIN — SODIUM CHLORIDE, SODIUM LACTATE, POTASSIUM CHLORIDE, AND CALCIUM CHLORIDE 125 ML/HR: .6; .31; .03; .02 INJECTION, SOLUTION INTRAVENOUS at 08:31

## 2020-09-24 RX ADMIN — SODIUM CHLORIDE, SODIUM LACTATE, POTASSIUM CHLORIDE, AND CALCIUM CHLORIDE: .6; .31; .03; .02 INJECTION, SOLUTION INTRAVENOUS at 09:38

## 2020-09-24 RX ADMIN — Medication 0.2 MG: at 09:20

## 2020-09-24 RX ADMIN — PROPOFOL 100 MCG/KG/MIN: 10 INJECTION, EMULSION INTRAVENOUS at 09:37

## 2020-09-24 NOTE — ANESTHESIA PREPROCEDURE EVALUATION
Procedure:  DRUG INDUCED SLEEP ENDOSCOPY (N/A Throat)    Relevant Problems   CARDIO   (+) History of PTCA (2018 ONE STENT)   (+) History of coronary artery bypass graft (2012)   (+) MI (myocardial infarction) (HCC)      PULMONARY   (+) Asthma   (+) Sleep apnea   (+) Smoking (FORMER)        Physical Exam    Airway    Mallampati score: II  TM Distance: >3 FB  Neck ROM: full     Dental   Comment: ONE UPPER IMPLANT,     Cardiovascular      Pulmonary  Breath sounds clear to auscultation,     Other Findings        Anesthesia Plan  ASA Score- 3     Anesthesia Type- IV sedation with anesthesia with ASA Monitors  Additional Monitors:   Airway Plan:           Plan Factors-Exercise tolerance (METS): >4 METS  Chart reviewed  Patient is not a current smoker  Patient not instructed to abstain from smoking on day of procedure  Patient did not smoke on day of surgery  Induction- intravenous  Postoperative Plan-     Informed Consent- Anesthetic plan and risks discussed with patient  I personally reviewed this patient with the CRNA  Discussed and agreed on the Anesthesia Plan with the CRNA  Henok Ruff

## 2020-09-24 NOTE — DISCHARGE INSTRUCTIONS
Drug Induced Sleep Endoscopy     WHAT YOU SHOULD KNOW:   During a drug induced sleep endsocopy (DISE), your caregiver places a scope into your nose to see inside your nose and throat  You may need a DISE to find the cause of your sleep apnea  DISE helps your caregiver diagnose your condition and create a treatment plan  You might also have surgery or other treatments during a DISE  AFTER YOU LEAVE:     Follow up with your primary healthcare provider or specialist as directed:  Write down your questions so you remember to ask them during your visits  Medicines:   · Keep a current list of your medicines:  Include the amounts, and when, how, and why you take them  Take the list or the pill bottles to follow-up visits  Carry your medicine list with you in case of an emergency  Throw away old medicine lists  Use vitamins, herbs, or food supplements only as directed  · Take your medicine as directed:  Call your healthcare provider if you think your medicine is not working as expected  Tell him about any medicine allergies, and if you want to quit taking or change your medicine  Nutrition:  Most people eat and drink as usual after a laryngoscopy  Ask your primary healthcare provider if you are not sure  Contact your primary healthcare provider if:  · You have problems breathing or talking  · You see new injuries to your teeth, lips, or tongue  · Your pain does not go away or gets worse  · You have questions about your procedure, medicine, or care  If you have any questions or concerns during business hours please call our office at [phone]   After hours please call the surgeon on call or Dr Herbie Prince at [phone]

## 2020-09-24 NOTE — H&P
Consultation - Otolaryngology - Head and Neck Surgery  Facial Plastic and Reconstructive Surgery  Erline Felty 76 y o  male MRN: [id number]  Encounter: [id number]        Assessment/Plan: Unchanged from previous    Obstructive sleep apnea: We discussed the nature of obstructive sleep apnea  We discussed the natural history of sleep apnea  We discussed options for management  We discussed non-surgical management including weight loss, mandibular advancement devices, and positive airway pressure therapy including various options  We discussed that he is not tolerating his CPAP and has at least moderate MILDRED with an unknown AHI listed as severe MILDRED and BMI of 35, he would like to move forward with Drug Induced Sleep Endoscopy to evaluate the source of the obstructions  We will plan for DISE and if necessary repeat sleep study if one has not been performed within 3 years  If surgical treatable causes of sleep apnea are seen such as tongue base laxity, we will consider options for surgical treatment  After the procedure we will further discuss surgical and non-surgical options, if necessary, at that time  History of Present Illness   Physician Requesting Consult: Laurence Meek MD  Reason for Consult / Principal Problem: sleep apnea  HPI: Erline Felty is a 76y o  year old male who presents with History of obstructive sleep apnea  Sleep study 2 years ago and has filed a CPAP multiple times since that time  He notes ongoing difficulty in tolerating the mask  He is unable to maintain a seal frequently  He has tried multiple masks  No previous airway evaluation  No previous head or neck surgery  No neck masses  Non-smoker  No significant weight gain or loss recently  Presenting preop for his DISE today    Review of systems:  [address] was performed and negative except as above or otherwise noted in the medical record      Historical Information   Past Medical History:   Diagnosis Date    Allergic rhinitis  Asthma     [facility] [address] [facility]     Hypertension     Myocardial infarction ([facility] [address] [facility]     2012    Sleep apnea     does not use C-PAP     Past Surgical History:   Procedure Laterality Date    CORONARY ARTERY BYPASS GRAFT  2012    2 stents   2018    / 2012 3 vessel bypass    REPLACEMENT TOTAL KNEE BILATERAL      ROTATOR CUFF REPAIR       Social History   Social History     Substance and Sexual Activity   Alcohol Use Not Currently     Social History     Substance and Sexual Activity   Drug Use Never     Social History     Tobacco Use   Smoking Status Former Smoker    Packs/day: 0 50    Years: 6 00    Pack years: 3 00    Types: Cigarettes    Last attempt to quit:     Years since quittin 7   Smokeless Tobacco Former User     Family History:   Family History   Problem Relation Age of Onset    Cancer Brother     Melanoma Brother        No current facility-administered medications on file prior to encounter  Current Outpatient Medications on File Prior to Encounter   Medication Sig    albuterol (PROVENTIL HFA) 90 mcg/act inhaler Inhale 1-2 puffs    aspirin 81 MG tablet Take 1 tablet by mouth daily    clopidogrel (PLAVIX) 75 mg tablet clopidogrel 75 mg tablet    fluticasone-salmeterol (ADVAIR DISKUS) 250-50 mcg/dose inhaler Inhale 1 puff 2 (two) times a day    metoprolol succinate (TOPROL XL) 25 mg 24 hr tablet Toprol XL 25 mg tablet,extended release    montelukast (SINGULAIR) 10 mg tablet montelukast 10 mg tablet    Multiple Vitamins-Minerals (VITAMIN D3 COMPLETE PO) Daily    nitroglycerin (NITROSTAT) 0 4 mg SL tablet nitroglycerin 0 4 mg sublingual tablet   PLACE 1 TABLET (0 4 MG) BY SUBLINGUAL ROUTE EVERY 5 MINUTES AS NEEDEDFOR CHEST PAIN   DO NOT EXCEED 3 DOSES IN 15 MINUTES     OMEGA-3 FATTY ACIDS PO 360mg       Allergies   Allergen Reactions    Sulfa Antibiotics     Clarithromycin Rash    Penicillins Hives and Rash       Vitals:    20 0822   BP: 126/81   Pulse: 82 Resp: 18   Temp: (!) 97 2 °F (36 2 °C)   SpO2: 95%       Physical Exam   Constitutional: Oriented to person, place, and time  Well-developed and well-nourished, no apparent distress, non-toxic appearance  Cooperative, able to hear and answer questions without difficulty  Voice: Normal voice quality  Head: Normocephalic, atraumatic  No scars, masses or lesions  Face: Symmetric, no edema, no sinus tenderness  Eyes: Vision grossly intact, extra-ocular movement intact  Ears: External ears normal  Tympanic membranes intact with intact normal landmarks  No post-auricular erythema or tenderness  Nose: Septum intact, nares clear  Mucosa moist, turbinates well appearing  No crusting, polyps or discharge evident  Oral cavity: Dentition intact  Mucosa moist, lips without lesions or masses  Tongue mobile, floor of mouth soft and flat  Hard palate intact  No masses or lesions  Oropharynx: Uvula is midline, soft palate intact without lesion or mass  Oropharyngeal inlet without obstruction  Tonsils unremarkable  Posterior pharyngeal wall clear  No masses or lesions  Salivary glands:  Parotid glands and submandibular glands symmetric, no enlargement or tenderness  Neck: Normal laryngeal elevation with swallow  Trachea midline  No masses or lesions  No palpable adenopathy  Thyroid: Without tenderness or palpable nodules  Pulmonary/Chest: Normal effort and rate  No respiratory distress  No stertor or stridor  Musculoskeletal: Normal range of motion  Neurological: Cranial nerves 2-12 intact  Skin: Skin is warm and dry  Psychiatric: Normal mood and affect  CTAB  RRR      Imaging Studies: I have personally reviewed pertinent reports  Lab Results: I have personally reviewed pertinent lab results  Greater than 40 minutes were spent in consultation  More than 1/2 of that time was spent in counselling and coordination of care

## 2020-09-24 NOTE — ANESTHESIA POSTPROCEDURE EVALUATION
Post-Op Assessment Note    CV Status:  Stable  Pain Score: 0    Pain management: adequate     Mental Status:  Alert and awake   Hydration Status:  Euvolemic   PONV Controlled:  Controlled   Airway Patency:  Patent      Post Op Vitals Reviewed: Yes      Staff: Anesthesiologist, CRNA         No complications documented      BP (P) 135/80 (09/24/20 0950)    Temp (!) (P) 97 4 °F (36 3 °C) (09/24/20 0950)    Pulse (P) 81 (09/24/20 0950)   Resp (P) 16 (09/24/20 0950)    SpO2 (P) 97 % (09/24/20 0950)

## 2020-09-24 NOTE — OP NOTE
OPERATIVE REPORT  PATIENT NAME: Tennille Underwood    :  1955  MRN: [id number]  Pt Location: [facility] OR [facility] 05    SURGERY DATE: 2020    Surgeon(s) and Role:     * Johann Chicas MD - Primary    Preop Diagnosis:  MILDRED (obstructive sleep apnea) [G47 33]    Post-Op Diagnosis Codes:     * MILDRED (obstructive sleep apnea) [G47 33]    Procedure(s) (LRB):  DRUG INDUCED SLEEP ENDOSCOPY (N/A)    Specimen(s):  * No specimens in log *    Estimated Blood Loss:   Minimal    Drains:  * No LDAs found *    Anesthesia Type:   General    Operative Indications:  MILDRED (obstructive sleep apnea) [G47 33]    Operative Findings:  V 3 complete AP  O 3 complete AP  T 3 complete AP  E 3 complete AP    Complications:   None    Procedure and Technique:    PREOPERATIVE DIAGNOSES: Obstructive sleep apnea with positive pressure   intolerance and persistent sleep apnea after CPAP trial    POSTOPERATIVE DIAGNOSES: Same    PROCEDURES: Drug-induced sleep endoscopy (flexible fiberoptic laryngoscopy   with examination under anesthesia)  ANESTHESIA: IV sedation  ESTIMATED BLOOD LOSS: None  COMPLICATIONS: None  INDICATIONS: Tennille Underwood is a 76y o  year-old male with a history of symptomatic obstructive sleep apnea, who is intolerant and unable to achieve benefit with positive pressure therapy  He presents today for drug-induced sleep endoscopy to better characterize her locations and pattern of obstruction and to predict appropriate medical and/or surgical options moving forward  FINDINGS: There was no evidence of complete concentric palatal obstruction and he does appear to be a candidate anatomically for hypoglossal nerve   stimulation therapy  DESCRIPTION OF PROCEDURE: Tennille Underwood was brought to the operating room and was anesthetized via the standard drug-induced sleep endoscopy protocol  The propofol infusion rate was startedand gradually increased until conditions that mimic sleep were gradually observed       With the patient not responsive to verbal commands, but still with spontaneous respiration, sleep disordered breathing events and associated desaturations were clearly observed    Under these conditions, the flexible endoscope was inserted to examine both sides of the nose as well as the pharynx and larynx  The VOTE score at baseline was V: 3 complete AP; O: 3 complete AP; T: 3 complete AP; E: 3 complete AP  With simulated jaw advancement and tongue advancement, the hypopharyngeal obstruction and secondarily the palatal collapse also improved  In summary, there was no evidence of complete concentric palatal obstruction and he does appear to be a candidate anatomically for hypoglossal nerve stimulation therapy  The patient was then allowed to awaken while monitoring by anesthesia was performed until he was awake and able to maintain his own saturations  The patient was taken to the recovery area in stable condition  All instruments and sponge counts were correct at the end of the procedure  Anibal Johnson MD, was present for and performed all key elements of the procedure           I was present for the entire procedure and A qualified resident physician was not available    Patient Disposition:  PACU  and hemodynamically stable    SIGNATURE: Ana María Moore MD  DATE: September 24, 2020  TIME: 9:36 AM

## 2020-09-30 ENCOUNTER — TRANSCRIBE ORDERS (OUTPATIENT)
Dept: ADMINISTRATIVE | Facility: HOSPITAL | Age: 65
End: 2020-09-30

## 2020-09-30 DIAGNOSIS — M54.40 BACK PAIN OF LUMBAR REGION WITH SCIATICA: Primary | ICD-10-CM

## 2020-10-05 ENCOUNTER — HOSPITAL ENCOUNTER (OUTPATIENT)
Dept: MRI IMAGING | Facility: HOSPITAL | Age: 65
Discharge: HOME | End: 2020-10-05
Attending: INTERNAL MEDICINE
Payer: MEDICARE

## 2020-10-05 ENCOUNTER — HOSPITAL ENCOUNTER (OUTPATIENT)
Dept: RADIOLOGY | Facility: HOSPITAL | Age: 65
Discharge: HOME | End: 2020-10-05
Payer: MEDICARE

## 2020-10-05 DIAGNOSIS — M54.40 BACK PAIN OF LUMBAR REGION WITH SCIATICA: ICD-10-CM

## 2020-10-05 DIAGNOSIS — R52 PAIN: ICD-10-CM

## 2020-10-05 PROCEDURE — G1004 CDSM NDSC: HCPCS

## 2020-10-05 PROCEDURE — 72148 MRI LUMBAR SPINE W/O DYE: CPT

## 2020-10-05 PROCEDURE — 72110 X-RAY EXAM L-2 SPINE 4/>VWS: CPT

## 2020-10-15 ENCOUNTER — APPOINTMENT (OUTPATIENT)
Dept: RADIOLOGY | Facility: CLINIC | Age: 65
End: 2020-10-15
Payer: MEDICARE

## 2020-10-15 ENCOUNTER — OFFICE VISIT (OUTPATIENT)
Dept: URGENT CARE | Facility: CLINIC | Age: 65
End: 2020-10-15
Payer: MEDICARE

## 2020-10-15 VITALS
HEIGHT: 71 IN | HEART RATE: 80 BPM | RESPIRATION RATE: 18 BRPM | BODY MASS INDEX: 34.3 KG/M2 | DIASTOLIC BLOOD PRESSURE: 77 MMHG | TEMPERATURE: 97.7 F | OXYGEN SATURATION: 97 % | WEIGHT: 245 LBS | SYSTOLIC BLOOD PRESSURE: 128 MMHG

## 2020-10-15 DIAGNOSIS — M25.521 RIGHT ELBOW PAIN: ICD-10-CM

## 2020-10-15 DIAGNOSIS — L03.113 CELLULITIS OF RIGHT ELBOW: Primary | ICD-10-CM

## 2020-10-15 PROCEDURE — 99213 OFFICE O/P EST LOW 20 MIN: CPT | Performed by: NURSE PRACTITIONER

## 2020-10-15 PROCEDURE — G0463 HOSPITAL OUTPT CLINIC VISIT: HCPCS | Performed by: NURSE PRACTITIONER

## 2020-10-15 PROCEDURE — 73080 X-RAY EXAM OF ELBOW: CPT

## 2020-10-15 RX ORDER — DOXYCYCLINE 100 MG/1
100 TABLET ORAL 2 TIMES DAILY
Qty: 14 TABLET | Refills: 0 | Status: SHIPPED | OUTPATIENT
Start: 2020-10-15 | End: 2020-10-22

## 2020-12-04 ENCOUNTER — APPOINTMENT (OUTPATIENT)
Dept: RADIOLOGY | Facility: CLINIC | Age: 65
End: 2020-12-04
Payer: MEDICARE

## 2020-12-04 ENCOUNTER — TRANSCRIBE ORDERS (OUTPATIENT)
Dept: URGENT CARE | Facility: CLINIC | Age: 65
End: 2020-12-04

## 2020-12-04 DIAGNOSIS — R06.02 SOB (SHORTNESS OF BREATH): Primary | ICD-10-CM

## 2020-12-04 DIAGNOSIS — G47.33 OBSTRUCTIVE SLEEP APNEA: ICD-10-CM

## 2020-12-04 DIAGNOSIS — R06.02 SOB (SHORTNESS OF BREATH): ICD-10-CM

## 2020-12-04 PROCEDURE — 71046 X-RAY EXAM CHEST 2 VIEWS: CPT

## 2020-12-04 PROCEDURE — U0003 INFECTIOUS AGENT DETECTION BY NUCLEIC ACID (DNA OR RNA); SEVERE ACUTE RESPIRATORY SYNDROME CORONAVIRUS 2 (SARS-COV-2) (CORONAVIRUS DISEASE [COVID-19]), AMPLIFIED PROBE TECHNIQUE, MAKING USE OF HIGH THROUGHPUT TECHNOLOGIES AS DESCRIBED BY CMS-2020-01-R: HCPCS | Performed by: OTOLARYNGOLOGY

## 2020-12-05 LAB — SARS-COV-2 RNA SPEC QL NAA+PROBE: DETECTED

## 2021-01-04 ENCOUNTER — APPOINTMENT (OUTPATIENT)
Dept: RADIOLOGY | Facility: CLINIC | Age: 66
End: 2021-01-04
Payer: MEDICARE

## 2021-01-04 ENCOUNTER — TRANSCRIBE ORDERS (OUTPATIENT)
Dept: URGENT CARE | Facility: CLINIC | Age: 66
End: 2021-01-04

## 2021-01-04 DIAGNOSIS — R05.9 COUGH: ICD-10-CM

## 2021-01-04 DIAGNOSIS — R06.2 WHEEZING: ICD-10-CM

## 2021-01-04 DIAGNOSIS — R05.9 COUGH: Primary | ICD-10-CM

## 2021-01-04 PROCEDURE — 71046 X-RAY EXAM CHEST 2 VIEWS: CPT

## 2021-03-10 DIAGNOSIS — Z23 ENCOUNTER FOR IMMUNIZATION: ICD-10-CM

## 2021-03-19 ENCOUNTER — IMMUNIZATIONS (OUTPATIENT)
Dept: FAMILY MEDICINE CLINIC | Facility: HOSPITAL | Age: 66
End: 2021-03-19

## 2021-03-19 DIAGNOSIS — Z23 ENCOUNTER FOR IMMUNIZATION: Primary | ICD-10-CM

## 2021-03-19 PROCEDURE — 0011A SARS-COV-2 / COVID-19 MRNA VACCINE (MODERNA) 100 MCG: CPT

## 2021-03-19 PROCEDURE — 91301 SARS-COV-2 / COVID-19 MRNA VACCINE (MODERNA) 100 MCG: CPT

## 2021-04-21 ENCOUNTER — IMMUNIZATIONS (OUTPATIENT)
Dept: FAMILY MEDICINE CLINIC | Facility: HOSPITAL | Age: 66
End: 2021-04-21

## 2021-04-21 DIAGNOSIS — Z23 ENCOUNTER FOR IMMUNIZATION: Primary | ICD-10-CM

## 2021-04-21 PROCEDURE — 0012A SARS-COV-2 / COVID-19 MRNA VACCINE (MODERNA) 100 MCG: CPT

## 2021-04-21 PROCEDURE — 91301 SARS-COV-2 / COVID-19 MRNA VACCINE (MODERNA) 100 MCG: CPT

## 2021-07-27 ENCOUNTER — APPOINTMENT (OUTPATIENT)
Dept: LAB | Facility: CLINIC | Age: 66
End: 2021-07-27
Payer: MEDICARE

## 2021-07-27 ENCOUNTER — CLINICAL SUPPORT (OUTPATIENT)
Dept: URGENT CARE | Facility: CLINIC | Age: 66
End: 2021-07-27
Payer: MEDICARE

## 2021-07-27 DIAGNOSIS — N52.9 ERECTILE DYSFUNCTION, UNSPECIFIED ERECTILE DYSFUNCTION TYPE: ICD-10-CM

## 2021-07-27 DIAGNOSIS — I10 ESSENTIAL HYPERTENSION: ICD-10-CM

## 2021-07-27 DIAGNOSIS — R73.9 HYPERGLYCEMIA: ICD-10-CM

## 2021-07-27 DIAGNOSIS — N42.9 DISEASE OF PROSTATE: ICD-10-CM

## 2021-07-27 DIAGNOSIS — Z79.899 ENCOUNTER FOR LONG-TERM (CURRENT) USE OF OTHER MEDICATIONS: ICD-10-CM

## 2021-07-27 DIAGNOSIS — G47.33 OBSTRUCTIVE SLEEP APNEA: ICD-10-CM

## 2021-07-27 DIAGNOSIS — E78.2 MIXED HYPERLIPIDEMIA: ICD-10-CM

## 2021-07-27 DIAGNOSIS — R63.5 WEIGHT GAIN: ICD-10-CM

## 2021-07-27 DIAGNOSIS — R94.31 ABNORMAL EKG: Primary | ICD-10-CM

## 2021-07-27 LAB
25(OH)D3 SERPL-MCNC: 40.9 NG/ML (ref 30–100)
ALBUMIN SERPL BCP-MCNC: 3.6 G/DL (ref 3.5–5)
ALP SERPL-CCNC: 84 U/L (ref 46–116)
ALT SERPL W P-5'-P-CCNC: 33 U/L (ref 12–78)
ANION GAP SERPL CALCULATED.3IONS-SCNC: 7 MMOL/L (ref 4–13)
AST SERPL W P-5'-P-CCNC: 20 U/L (ref 5–45)
BASOPHILS # BLD AUTO: 0.09 THOUSANDS/ΜL (ref 0–0.1)
BASOPHILS NFR BLD AUTO: 1 % (ref 0–1)
BILIRUB SERPL-MCNC: 0.91 MG/DL (ref 0.2–1)
BUN SERPL-MCNC: 16 MG/DL (ref 5–25)
CALCIUM SERPL-MCNC: 8.9 MG/DL (ref 8.3–10.1)
CHLORIDE SERPL-SCNC: 107 MMOL/L (ref 100–108)
CHOLEST SERPL-MCNC: 121 MG/DL (ref 50–200)
CO2 SERPL-SCNC: 24 MMOL/L (ref 21–32)
CREAT SERPL-MCNC: 1.05 MG/DL (ref 0.6–1.3)
EOSINOPHIL # BLD AUTO: 0.23 THOUSAND/ΜL (ref 0–0.61)
EOSINOPHIL NFR BLD AUTO: 3 % (ref 0–6)
ERYTHROCYTE [DISTWIDTH] IN BLOOD BY AUTOMATED COUNT: 13.5 % (ref 11.6–15.1)
EST. AVERAGE GLUCOSE BLD GHB EST-MCNC: 111 MG/DL
GFR SERPL CREATININE-BSD FRML MDRD: 74 ML/MIN/1.73SQ M
GLUCOSE P FAST SERPL-MCNC: 96 MG/DL (ref 65–99)
HBA1C MFR BLD: 5.5 %
HCT VFR BLD AUTO: 47.6 % (ref 36.5–49.3)
HDLC SERPL-MCNC: 46 MG/DL
HGB BLD-MCNC: 16.2 G/DL (ref 12–17)
IMM GRANULOCYTES # BLD AUTO: 0.02 THOUSAND/UL (ref 0–0.2)
IMM GRANULOCYTES NFR BLD AUTO: 0 % (ref 0–2)
LDLC SERPL CALC-MCNC: 42 MG/DL (ref 0–100)
LYMPHOCYTES # BLD AUTO: 2.11 THOUSANDS/ΜL (ref 0.6–4.47)
LYMPHOCYTES NFR BLD AUTO: 23 % (ref 14–44)
MCH RBC QN AUTO: 30.7 PG (ref 26.8–34.3)
MCHC RBC AUTO-ENTMCNC: 34 G/DL (ref 31.4–37.4)
MCV RBC AUTO: 90 FL (ref 82–98)
MONOCYTES # BLD AUTO: 1.04 THOUSAND/ΜL (ref 0.17–1.22)
MONOCYTES NFR BLD AUTO: 12 % (ref 4–12)
NEUTROPHILS # BLD AUTO: 5.59 THOUSANDS/ΜL (ref 1.85–7.62)
NEUTS SEG NFR BLD AUTO: 61 % (ref 43–75)
NONHDLC SERPL-MCNC: 75 MG/DL
NRBC BLD AUTO-RTO: 0 /100 WBCS
NT-PROBNP SERPL-MCNC: 83 PG/ML
PLATELET # BLD AUTO: 192 THOUSANDS/UL (ref 149–390)
PMV BLD AUTO: 10.5 FL (ref 8.9–12.7)
POTASSIUM SERPL-SCNC: 3.8 MMOL/L (ref 3.5–5.3)
PROT SERPL-MCNC: 6.8 G/DL (ref 6.4–8.2)
PSA SERPL-MCNC: 2.2 NG/ML (ref 0–4)
RBC # BLD AUTO: 5.27 MILLION/UL (ref 3.88–5.62)
SODIUM SERPL-SCNC: 138 MMOL/L (ref 136–145)
T4 FREE SERPL-MCNC: 1.05 NG/DL (ref 0.76–1.46)
TESTOST SERPL-MCNC: 302 NG/DL (ref 95–948)
TRIGL SERPL-MCNC: 167 MG/DL
TSH SERPL DL<=0.05 MIU/L-ACNC: 4.77 UIU/ML (ref 0.36–3.74)
WBC # BLD AUTO: 9.08 THOUSAND/UL (ref 4.31–10.16)

## 2021-07-27 PROCEDURE — 85025 COMPLETE CBC W/AUTO DIFF WBC: CPT

## 2021-07-27 PROCEDURE — 83036 HEMOGLOBIN GLYCOSYLATED A1C: CPT

## 2021-07-27 PROCEDURE — 80053 COMPREHEN METABOLIC PANEL: CPT

## 2021-07-27 PROCEDURE — 80061 LIPID PANEL: CPT

## 2021-07-27 PROCEDURE — 93005 ELECTROCARDIOGRAM TRACING: CPT | Performed by: FAMILY MEDICINE

## 2021-07-27 PROCEDURE — 83880 ASSAY OF NATRIURETIC PEPTIDE: CPT

## 2021-07-27 PROCEDURE — G0103 PSA SCREENING: HCPCS

## 2021-07-27 PROCEDURE — 84443 ASSAY THYROID STIM HORMONE: CPT

## 2021-07-27 PROCEDURE — 84403 ASSAY OF TOTAL TESTOSTERONE: CPT

## 2021-07-27 PROCEDURE — 82306 VITAMIN D 25 HYDROXY: CPT

## 2021-07-27 PROCEDURE — 84439 ASSAY OF FREE THYROXINE: CPT

## 2021-07-27 PROCEDURE — 36415 COLL VENOUS BLD VENIPUNCTURE: CPT

## 2021-07-30 LAB
ATRIAL RATE: 76 BPM
P AXIS: 54 DEGREES
PR INTERVAL: 144 MS
QRS AXIS: -10 DEGREES
QRSD INTERVAL: 102 MS
QT INTERVAL: 408 MS
QTC INTERVAL: 459 MS
T WAVE AXIS: 75 DEGREES
VENTRICULAR RATE: 76 BPM

## 2021-07-30 PROCEDURE — 93010 ELECTROCARDIOGRAM REPORT: CPT | Performed by: INTERNAL MEDICINE

## 2021-08-31 ENCOUNTER — ANESTHESIA EVENT (OUTPATIENT)
Dept: PERIOP | Facility: HOSPITAL | Age: 66
End: 2021-08-31
Payer: MEDICARE

## 2021-08-31 NOTE — PRE-PROCEDURE INSTRUCTIONS
Pre-Surgery Instructions:   Medication Instructions    albuterol (PROVENTIL HFA) 90 mcg/act inhaler Use DOS    aspirin 81 MG tablet Do not hold - take as directed    clopidogrel (PLAVIX) 75 mg tablet Hold 5 days prior to surgery     fenofibrate (TRICOR) 54 MG tablet Take as directed    fluticasone-salmeterol (ADVAIR DISKUS) 250-50 mcg/dose inhaler Use DOS    levothyroxine 50 mcg tablet Take as directed    metoprolol succinate (TOPROL XL) 25 mg 24 hr tablet Take as directed    mometasone (ELOCON) 0 1 % lotion Hold DOS    montelukast (SINGULAIR) 10 mg tablet Take as directed    Multiple Vitamins-Minerals (VITAMIN D3 COMPLETE PO) Hold for one week prior to surgery    nitroglycerin (NITROSTAT) 0 4 mg SL tablet PRN    OMEGA-3 FATTY ACIDS PO Hold for one week prior to surgery    omeprazole (PriLOSEC) 20 mg delayed release capsule Take as directed    predniSONE 5 mg tablet Take as directed    tiotropium (SPIRIVA) 18 mcg inhalation capsule Take as directed included DOS     My Surgical Experience    The following information was developed to assist you to prepare for your operation  What do I need to do before coming to the hospital?   Arrange for a responsible person to drive you to and from the hospital    Arrange care for your children at home  Children are not allowed in the recovery areas of the hospital   Plan to wear clothing that is easy to put on and take off  If you are having shoulder surgery, wear a shirt that buttons or zippers in the front  Bathing  o Shower the evening before and the morning of your surgery with an antibacterial soap  Please refer to the Pre Op Showering Instructions for Surgery Patients Sheet   o Remove nail polish and all body piercing jewelry  o Do not shave any body part for at least 24 hours before surgery-this includes face, arms, legs and upper body  Food  o Nothing to eat or drink after midnight the night before your surgery   This includes candy and chewing gum  o Exception: If your surgery is after 12:00pm (noon), you may have clear liquids such as 7-Up®, ginger ale, apple or cranberry juice, Jell-O®, water, or clear broth until 8:00 am  o Do not drink milk or juice with pulp on the morning before surgery  o Do not drink alcohol 24 hours before surgery  Medicine  o Follow instructions you received from your surgeon about which medicines you may take on the day of surgery  o If instructed to take medicine on the morning of surgery, take pills with just a small sip of water  Call your prescribing doctor for specific infroamtion on what to do if you take insulin    What should I bring to the hospital?    Bring:  Trin[place] Saeid or a walker, if you have them, for foot or knee surgery   A list of the daily medicines, vitamins, minerals, herbals and nutritional supplements you take  Include the dosages of medicines and the time you take them each day   Glasses, dentures or hearing aids   Minimal clothing; you will be wearing hospital sleepwear   Photo ID; required to verify your identity   If you have a Living Will or Power of , bring a copy of the documents   If you have an ostomy, bring an extra pouch and any supplies you use    Do not bring   Medicines or inhalers   Money, valuables or jewelry    What other information should I know about the day of surgery?  Notify your surgeons if you develop a cold, sore throat, cough, fever, rash or any other illness   Report to the Ambulatory Surgical/Same Day Surgery Unit   You will be instructed to stop at Registration only if you have not been pre-registered   Inform your  fi they do not stay that they will be asked by the staff to leave a phone number where they can be reached   Be available to be reached before surgery   In the event the operating room schedule changes, you may be asked to come in earlier or later than expected    *It is important to tell your doctor and others involved in your health care if you are taking or have been taking any non-prescription drugs, vitamins, minerals, herbals or other nutritional supplements   Any of these may interact with some food or medicines and cause a reaction

## 2021-09-07 NOTE — ANESTHESIA PREPROCEDURE EVALUATION
Procedure:  INSERTION UPPER AIRWAY STIMULATOR, INSPIRE IMPLANT (N/A Head)    Relevant Problems   ANESTHESIA (within normal limits)      CARDIO  cards clearance done    (+) History of PTCA   (+) History of coronary artery bypass graft   (+) MI (myocardial infarction) (HCC)      ENDO (within normal limits)      /RENAL (within normal limits)      HEMATOLOGY (within normal limits)      MUSCULOSKELETAL  b/l TKR       NEURO/PSYCH (within normal limits)      PULMONARY  chronic pulm opacities - followed by pulm    (+) Asthma   (+) Sleep apnea   (+) Smoking        Physical Exam    Airway    Mallampati score: II  TM Distance: >3 FB  Neck ROM: full     Dental   No notable dental hx     Cardiovascular  Rhythm: regular, Rate: normal, Cardiovascular exam normal    Pulmonary  Pulmonary exam normal Breath sounds clear to auscultation,     Other Findings  Left lower bridge       Anesthesia Plan  ASA Score- 3     Anesthesia Type- general with ASA Monitors  Additional Monitors:   Airway Plan: ETT  Plan Factors-    Chart reviewed  EKG reviewed  Imaging results reviewed  Existing labs reviewed  Patient summary reviewed  Patient is not a current smoker  Induction- intravenous  Postoperative Plan-     Informed Consent- Anesthetic plan and risks discussed with patient

## 2021-09-08 ENCOUNTER — APPOINTMENT (OUTPATIENT)
Dept: RADIOLOGY | Facility: HOSPITAL | Age: 66
End: 2021-09-08
Payer: MEDICARE

## 2021-09-08 ENCOUNTER — HOSPITAL ENCOUNTER (OUTPATIENT)
Facility: HOSPITAL | Age: 66
Setting detail: OUTPATIENT SURGERY
Discharge: HOME | End: 2021-09-08
Attending: OTOLARYNGOLOGY | Admitting: OTOLARYNGOLOGY
Payer: MEDICARE

## 2021-09-08 ENCOUNTER — ANESTHESIA (OUTPATIENT)
Dept: PERIOP | Facility: HOSPITAL | Age: 66
End: 2021-09-08
Payer: MEDICARE

## 2021-09-08 VITALS
RESPIRATION RATE: 20 BRPM | HEART RATE: 81 BPM | SYSTOLIC BLOOD PRESSURE: 131 MMHG | DIASTOLIC BLOOD PRESSURE: 73 MMHG | HEIGHT: 71 IN | OXYGEN SATURATION: 94 % | BODY MASS INDEX: 33.04 KG/M2 | WEIGHT: 236 LBS | TEMPERATURE: 98.4 F

## 2021-09-08 DIAGNOSIS — G47.33 OBSTRUCTIVE SLEEP APNEA: Primary | ICD-10-CM

## 2021-09-08 PROCEDURE — C1778 LEAD, NEUROSTIMULATOR: HCPCS | Performed by: OTOLARYNGOLOGY

## 2021-09-08 PROCEDURE — C1787 PATIENT PROGR, NEUROSTIM: HCPCS | Performed by: OTOLARYNGOLOGY

## 2021-09-08 PROCEDURE — C1767 GENERATOR, NEURO NON-RECHARG: HCPCS | Performed by: OTOLARYNGOLOGY

## 2021-09-08 PROCEDURE — 0466T PR INSRT CH WALL RESPIR ELTRD/RA & CONJ PULSE GEN: CPT | Performed by: OTOLARYNGOLOGY

## 2021-09-08 PROCEDURE — 71045 X-RAY EXAM CHEST 1 VIEW: CPT

## 2021-09-08 PROCEDURE — 64568 OPN IMPLTJ CRNL NRV NEA&PG: CPT | Performed by: OTOLARYNGOLOGY

## 2021-09-08 PROCEDURE — 72040 X-RAY EXAM NECK SPINE 2-3 VW: CPT

## 2021-09-08 DEVICE — THE SENSING LEAD INCORPORATES A DIFFERENTIAL PRESSURE SENSOR THAT DETECTS RESPIRATORY CYCLES.  THE CONNECTOR END OF THE LEAD IS CONNECTED TO THE IPG.  THE SENSOR IS IMPLANTED IN BETWEEN THE INTERCOSTAL MUSCLE LAYERS. THE DEVICE SENSES RELATIVE PRESSURE VARIATIONS THAT CORRESPOND TO THE RESPIRATION CYCLE.  THE PRESSURE WAVEFORM IS MONITORED BY THE IPG AND TRIGGERS STIMULATION THERAPY SYNCHRONOUS WITH INSPIRATION
Type: IMPLANTABLE DEVICE | Site: CHEST | Status: FUNCTIONAL
Brand: INSPIRE

## 2021-09-08 DEVICE — IPG INSPIRE IV MODEL 3028: Type: IMPLANTABLE DEVICE | Site: CHEST | Status: FUNCTIONAL

## 2021-09-08 DEVICE — THE STIMULATION LEAD INCORPORATES A CUFF SEGMENT THAT INCLUDES THREE ELECTRODES IN A GUARDED-BIPOLAR CONFIGURATION, WHICH MEANS THE CENTER ELECTRODE IS CONNECTED TO ONE POLE OF THE STIMULATION (E.G., NEGATIVE POLE), AND IS FLANKED ON EACH SIDE BY TWO ELECTRODES CONNECTED TO THE OPPOSITE POLE OF STIMULATION (E.G., POSITIVE POLE).  IT IS PACKAGED WITH A TUNNELING ROD TO FACILITATE THE IMPLANT PROCEDURE.  THE CUFF IS SURGICALLY POSITIONED AROUND A PATIENT’S HYPOGLOSSAL NERVE, AND THE CONNECTOR END OF THE LEAD IS CONNECTED TO THE IPG.  THE CUFF ELECTRODES APPLY ELECTRICAL CURRENT THAT STIMULATES THE NERVE CAUSING THE TONGUE TO MOVE FORWARD.  THE TONGUE MOVEMENT (VIA GENIOGLOSSUS AND OTHER TONGUE PROTRUSOR MUSCLE CONTRACTION) SERVES TO MAINTAIN AN OPEN AIRWAY. THE FIGURE BELOW PROVIDES AN ILLUSTRATION OF THE MODEL 4063 STIMULATION LEAD.
Type: IMPLANTABLE DEVICE | Site: NECK | Status: FUNCTIONAL
Brand: INSPIRE

## 2021-09-08 RX ORDER — HYDROMORPHONE HCL 110MG/55ML
PATIENT CONTROLLED ANALGESIA SYRINGE INTRAVENOUS AS NEEDED
Status: DISCONTINUED | OUTPATIENT
Start: 2021-09-08 | End: 2021-09-08

## 2021-09-08 RX ORDER — ROCURONIUM BROMIDE 10 MG/ML
INJECTION, SOLUTION INTRAVENOUS AS NEEDED
Status: DISCONTINUED | OUTPATIENT
Start: 2021-09-08 | End: 2021-09-08

## 2021-09-08 RX ORDER — SODIUM CHLORIDE, SODIUM LACTATE, POTASSIUM CHLORIDE, CALCIUM CHLORIDE 600; 310; 30; 20 MG/100ML; MG/100ML; MG/100ML; MG/100ML
INJECTION, SOLUTION INTRAVENOUS CONTINUOUS PRN
Status: DISCONTINUED | OUTPATIENT
Start: 2021-09-08 | End: 2021-09-08

## 2021-09-08 RX ORDER — CEFAZOLIN SODIUM 2 G/50ML
2000 SOLUTION INTRAVENOUS ONCE
Status: COMPLETED | OUTPATIENT
Start: 2021-09-08 | End: 2021-09-08

## 2021-09-08 RX ORDER — DEXAMETHASONE SODIUM PHOSPHATE 4 MG/ML
INJECTION, SOLUTION INTRA-ARTICULAR; INTRALESIONAL; INTRAMUSCULAR; INTRAVENOUS; SOFT TISSUE AS NEEDED
Status: DISCONTINUED | OUTPATIENT
Start: 2021-09-08 | End: 2021-09-08

## 2021-09-08 RX ORDER — ONDANSETRON 2 MG/ML
4 INJECTION INTRAMUSCULAR; INTRAVENOUS ONCE AS NEEDED
Status: DISCONTINUED | OUTPATIENT
Start: 2021-09-08 | End: 2021-09-08 | Stop reason: HOSPADM

## 2021-09-08 RX ORDER — OXYCODONE HCL 5 MG/5 ML
5 SOLUTION, ORAL ORAL EVERY 4 HOURS PRN
Status: DISCONTINUED | OUTPATIENT
Start: 2021-09-08 | End: 2021-09-08 | Stop reason: HOSPADM

## 2021-09-08 RX ORDER — LIDOCAINE HYDROCHLORIDE AND EPINEPHRINE 10; 10 MG/ML; UG/ML
INJECTION, SOLUTION INFILTRATION; PERINEURAL AS NEEDED
Status: DISCONTINUED | OUTPATIENT
Start: 2021-09-08 | End: 2021-09-08 | Stop reason: HOSPADM

## 2021-09-08 RX ORDER — EPHEDRINE SULFATE 50 MG/ML
INJECTION INTRAVENOUS AS NEEDED
Status: DISCONTINUED | OUTPATIENT
Start: 2021-09-08 | End: 2021-09-08

## 2021-09-08 RX ORDER — ONDANSETRON 2 MG/ML
INJECTION INTRAMUSCULAR; INTRAVENOUS AS NEEDED
Status: DISCONTINUED | OUTPATIENT
Start: 2021-09-08 | End: 2021-09-08

## 2021-09-08 RX ORDER — OXYCODONE HYDROCHLORIDE 5 MG/1
5 TABLET ORAL EVERY 4 HOURS PRN
Qty: 10 TABLET | Refills: 0 | Status: SHIPPED | OUTPATIENT
Start: 2021-09-08 | End: 2021-09-18

## 2021-09-08 RX ORDER — SODIUM CHLORIDE 9 MG/ML
125 INJECTION, SOLUTION INTRAVENOUS CONTINUOUS
Status: DISCONTINUED | OUTPATIENT
Start: 2021-09-08 | End: 2021-09-08 | Stop reason: HOSPADM

## 2021-09-08 RX ORDER — ACETAMINOPHEN 160 MG/5ML
650 SUSPENSION, ORAL (FINAL DOSE FORM) ORAL ONCE
Status: COMPLETED | OUTPATIENT
Start: 2021-09-08 | End: 2021-09-08

## 2021-09-08 RX ORDER — FENTANYL CITRATE/PF 50 MCG/ML
50 SYRINGE (ML) INJECTION
Status: DISCONTINUED | OUTPATIENT
Start: 2021-09-08 | End: 2021-09-08 | Stop reason: HOSPADM

## 2021-09-08 RX ORDER — KETOROLAC TROMETHAMINE 30 MG/ML
INJECTION, SOLUTION INTRAMUSCULAR; INTRAVENOUS AS NEEDED
Status: DISCONTINUED | OUTPATIENT
Start: 2021-09-08 | End: 2021-09-08

## 2021-09-08 RX ORDER — HYDROMORPHONE HCL/PF 1 MG/ML
0.5 SYRINGE (ML) INJECTION
Status: DISCONTINUED | OUTPATIENT
Start: 2021-09-08 | End: 2021-09-08 | Stop reason: HOSPADM

## 2021-09-08 RX ORDER — SUCCINYLCHOLINE/SOD CL,ISO/PF 100 MG/5ML
SYRINGE (ML) INTRAVENOUS AS NEEDED
Status: DISCONTINUED | OUTPATIENT
Start: 2021-09-08 | End: 2021-09-08

## 2021-09-08 RX ORDER — PROPOFOL 10 MG/ML
INJECTION, EMULSION INTRAVENOUS AS NEEDED
Status: DISCONTINUED | OUTPATIENT
Start: 2021-09-08 | End: 2021-09-08

## 2021-09-08 RX ORDER — FENTANYL CITRATE 50 UG/ML
INJECTION, SOLUTION INTRAMUSCULAR; INTRAVENOUS AS NEEDED
Status: DISCONTINUED | OUTPATIENT
Start: 2021-09-08 | End: 2021-09-08

## 2021-09-08 RX ORDER — MIDAZOLAM HYDROCHLORIDE 2 MG/2ML
INJECTION, SOLUTION INTRAMUSCULAR; INTRAVENOUS AS NEEDED
Status: DISCONTINUED | OUTPATIENT
Start: 2021-09-08 | End: 2021-09-08

## 2021-09-08 RX ORDER — CEPHALEXIN 500 MG/1
500 CAPSULE ORAL 4 TIMES DAILY
Qty: 28 CAPSULE | Refills: 0 | Status: SHIPPED | OUTPATIENT
Start: 2021-09-08 | End: 2021-09-15

## 2021-09-08 RX ADMIN — KETOROLAC TROMETHAMINE 15 MG: 30 INJECTION, SOLUTION INTRAMUSCULAR at 09:34

## 2021-09-08 RX ADMIN — SODIUM CHLORIDE 125 ML/HR: 0.9 INJECTION, SOLUTION INTRAVENOUS at 06:07

## 2021-09-08 RX ADMIN — FENTANYL CITRATE 50 MCG: 50 INJECTION INTRAMUSCULAR; INTRAVENOUS at 07:34

## 2021-09-08 RX ADMIN — HYDROMORPHONE HYDROCHLORIDE 0.5 MG: 2 INJECTION INTRAMUSCULAR; INTRAVENOUS; SUBCUTANEOUS at 09:20

## 2021-09-08 RX ADMIN — SODIUM CHLORIDE 125 ML/HR: 0.9 INJECTION, SOLUTION INTRAVENOUS at 10:33

## 2021-09-08 RX ADMIN — ONDANSETRON 4 MG: 2 INJECTION INTRAMUSCULAR; INTRAVENOUS at 09:20

## 2021-09-08 RX ADMIN — REMIFENTANIL HYDROCHLORIDE 0.2 MCG/KG/MIN: 1 INJECTION, POWDER, LYOPHILIZED, FOR SOLUTION INTRAVENOUS at 07:29

## 2021-09-08 RX ADMIN — FENTANYL CITRATE 50 MCG: 50 INJECTION INTRAMUSCULAR; INTRAVENOUS at 07:29

## 2021-09-08 RX ADMIN — EPHEDRINE SULFATE 10 MG: 50 INJECTION, SOLUTION INTRAVENOUS at 08:04

## 2021-09-08 RX ADMIN — CEFAZOLIN SODIUM 2000 MG: 2 SOLUTION INTRAVENOUS at 07:21

## 2021-09-08 RX ADMIN — Medication 100 MG: at 07:34

## 2021-09-08 RX ADMIN — SODIUM CHLORIDE, SODIUM LACTATE, POTASSIUM CHLORIDE, AND CALCIUM CHLORIDE: .6; .31; .03; .02 INJECTION, SOLUTION INTRAVENOUS at 08:27

## 2021-09-08 RX ADMIN — LIDOCAINE HYDROCHLORIDE 100 MG: 20 INJECTION INTRAVENOUS at 07:34

## 2021-09-08 RX ADMIN — DEXAMETHASONE SODIUM PHOSPHATE 12 MG: 4 INJECTION INTRA-ARTICULAR; INTRALESIONAL; INTRAMUSCULAR; INTRAVENOUS; SOFT TISSUE at 07:45

## 2021-09-08 RX ADMIN — MIDAZOLAM 2 MG: 1 INJECTION INTRAMUSCULAR; INTRAVENOUS at 07:27

## 2021-09-08 RX ADMIN — ROCURONIUM BROMIDE 10 MG: 50 INJECTION, SOLUTION INTRAVENOUS at 07:34

## 2021-09-08 RX ADMIN — PROPOFOL 200 MG: 10 INJECTION, EMULSION INTRAVENOUS at 07:34

## 2021-09-08 RX ADMIN — ACETAMINOPHEN 650 MG: 650 SUSPENSION ORAL at 11:16

## 2021-09-08 NOTE — DISCHARGE INSTRUCTIONS
ROMEL Velez  Inspire Hypoglossal Nerve Stimulator    Post-Operative Care  Office [phone]  Cell [phone]               At Home (in the days immediately following the procedure):   Try to sleep with your head elevated on 2-3 pillows   Iced packs placed over wound will help reduce swelling  They should be used 20 minutes on/ 20 minutes off while awake for the first full day  Crushed ice in ziplock bags or frozen peas or corn work well   The dressings may be removed 2 days after surgery  After the dressings are removed, the wounds should be cleaned with a Q-tip soaked in hydrogen peroxide mixed 50/50 with water   Apply antibiotic ointment (Bacitracin) or Vaseline to the external incisions 3-4 times per day   Take your medicines as prescribed  REMEMBER:  DO NOT DRIVE WHILE TAKING PAIN MEDICATIONS   You should do neck rolls 10 times clockwise and 10 times counterclockwise directions for 1 week after surgery   You may shower with luke-warm water only after the dressings are removed   You may use ibuprofen (Motrin, Advil) and acetaminophen (Tyelnol) for pain control in addition to any prescribed pain medications  You may get out of bed and go to the bathroom with assistance  Eat light, soft meals as tolerated, avoiding gas-stimulating foods  Follow-up care:   Eat before coming to the office for post-operative visits  Rest for the first week after the procedure, avoiding excessive physical activities, hard chewing, lifting objects over 8 lbs (about the weight of a phone book), or bending over  We request that you do not travel by plane for one week after surgery  Clean the wound at least twice daily using 1/2 hydrogen peroxide 1/2 water solution to remove any crusting (scabbing)  Lubricate your wound after cleaning with Q-tips and antibiotic ointment to soften hardened crusts  Follow-up visits:     At one week, the sutures will be removed; you may drive yourself to this appointment (as long as you are no longer taking prescription/narcotic pain medicines)  After dressing removal, make-up may be worn, avoiding the incision lines  Additional follow-up visits will be scheduled at this time  Note that final results from the incisions may not be apparent until [place] after surgery  Healing Care:   After surgery try not to roll onto the wound while asleep  Clean the external skin gently but thoroughly with soap  The use of alcohol and tobacco products prolong swelling and healing and are best avoided for 2 weeks after surgery  Do not expose your wound to sun for 4-6 weeks after surgery  Use sunscreen (SPF 30 or higher) for 6 months after surgery if sun exposure is absolutely necessary  Avoid any physical exercise that can cause over-heating or over-exertion for two weeks after the surgery  Your nose may be swollen and stuffy for several months  Complete healing may take 12 months  It is to your advantage to return for all postoperative visits so that long-term results may be evaluated  Frequently Asked Questions:  When can I shower and shampoo my hair? You may shower the day after your surgery, BUT KEEP ANY DRESSING DRY  This may mean you wash your face/hair in the sink instead  It is important that you do not use hot water, as this can increase the swelling  Lukewarm water is best       When will the swelling and bruising go away? This usually takes 7-10 days or so, but may take less or more time, depending on the individual     When can I take aspirin? You should not take aspirin for 2 weeks prior to or after surgery  The same is true for vitamin E, ginko, garlic pills, and other natural supplements  When can I take ibuprofen? Non-steroidal anti-inflammatory drugs such as advil (ibuprofen), alleve (naproxen), or other similar may be used immediately after surgery as per the guidelines on the package  When can I wear my glasses?    You will be able to wear contact lenses as soon as you feel comfortable  You may wear glasses one to two weeks after surgery, depending on the type of surgery you had  In any case, you must be careful not to place any pressure on the wound  When can I wear makeup? Make-up can be applied after suture removal, but not directly on the incisions until 3 weeks after the procedure  When will I activate my device? We will wait for your healing to finish prior to activation which usually means a few weeks  The plan will be set up at your first follow up appointment at 1 week after the procedure  What about exercise? Please adhere to the following schedule:   Up to week 1 after surgery:  REST! No strenuous exercise  Walking is ok  Week 1-3 after surgery: You may begin light aerobic exercise, but no bending over/straining/lifting weights  You may begin some range of motion exercises of your shoulder  Week 3+: You may begin more strenuous exercise, such as yoga, stretching, bending over, lifting weights  Please remember to start slowly  Week 6+: You may resume contact sports, such as soccer, basketball, etc    POST-OPERATIVE APPOINTMENTS:  1 week:  wound check in the office  1 month: device activation and wound check in the office  3 months: device titration sleep study at [address] [place] [address]  4 months: final wound check in the office  Yearly: device check at office  How to contact us:    Phone: If you have questions or concerns, please call us at [phone] during business hours (8 am to 5 pm)  On nights and weekends, you may page the ENT surgeon on call  at [facility]   In case of emergency, please call [phone]

## 2021-09-08 NOTE — OP NOTE
OPERATIVE REPORT  PATIENT NAME: Janae Duverney    :  1955  MRN: [id number]  Pt Location: [facility] OR ROOM 05    SURGERY DATE: 2021    Surgeon(s) and Role:     * Taiwo Morrissey MD - Primary     * Lazarus Rain - Assisting    Preop Diagnosis:  Obstructive sleep apnea [G47 33]    Post-Op Diagnosis Codes:     * Obstructive sleep apnea [G47 33]    Procedure(s) (LRB):  INSERTION UPPER AIRWAY STIMULATOR, INSPIRE IMPLANT (N/A)    Specimen(s):  * No specimens in log *    Estimated Blood Loss:   20 mL    Drains:  * No LDAs found *    Anesthesia Type:   General    Operative Indications:  Obstructive sleep apnea [G47 33]  BMI 32    Operative Findings:  Good stimulation at 0 6 mA  No retraction at 0 4 mA    Complications:   None    Procedure and Technique:  Indications for procedure: Janae Duverney is a 76 y o  male with a history of Moderate to Severe obstructive sleep apnea, who is intolerant and unable to achieve benefit from positive pressure therapy  Patient has passed the clinical, polysomnographic, and endoscopic screening criteria and presents today for the implant, whom I have seen in consultation for the above-listed procedure  After discussion of risks, benefits, and alternatives the patient elected to undergo the procedure and informed consent was obtained  Procedure in detail: The patient was brought back to the operating room laid in the supine position and general endotracheal anesthesia was administered  The patient was positioned appropriately  Appropriate time-out was taken and procedure, sidedness, and marking was confirmed  5 mL of 1% lidocaine with 1:100,000 epinephrine was infiltrated into the marked areas  Prior to prepping and draping, electrodes were placed in the genioglossus and styloglossus muscle and connected to the NIM box for intraoperative nerve monitoring  The patient was prepped and draped in standard fashion  Neuroplasty was performed as follow:  The lateral branches to retrusor muscles were identified, and tested intra-operatively using the NIM stimulator  The branches were identified and the inclusion branches were stimulated with both visual and neurostimulator confirmation  The branches were dissected in 360 degrees for 1 5 cm around the TV and C1 branches with care not to include the HG branches  Insertion of an upper airway stimulator was performed as follows: The cuff electrode for the hypoglossal nerve stimulator  was placed distally to these branches on the medial nerve branch to the genioglossus muscle  Diagnostic evaluation confirmed activation of the genioglossus nerve, resulting in genioglossal activation and tongue protrusion, confirmed visually  The stimulation electrode was then looped under and secured to the digastric tendon on its lateral surface with the provided anchor  Insertion of a thoracic sensor lead was performed as follows:  A second 5 cm incision was made in the right upper chest approximately 3 cm below the clavicle  Dissection was carried down to the pectoralis muscle  An inferior pocket was created deep to the subcutaneous layer and superficial to the pectoralis muscle  Dissection was carried down through pectoralis muscle using blunt dissection  The interspace between the 2nd and 3rd ribs were exposed  The external oblique muscles were identified, and bluntly dissected, and a tunnel was created between the external and internal intercostals in the 2nd intercostal space just on the superior aspect of the third rib  The pleural respiration sensor was placed into the pocket in the inferior aspect of the intercostal space  The sensor was sutured to the fascia using the provided anchors to maintain the sensor facing the pleural space  The stimulation lead was then tunneled in a subplatysmal plane and brought out into the sub-clavicular pocket  Insertion of an upper airway stimulator was continued as follows:  Both the cuff electrode and the respiration sensing lead were connected to the implantable pulse generator  Diagnostic evaluation was run, which confirmed a good respiration sensing signal as well as good tongue protrusion stimulation  The implantable pulse generator was placed in the subclavicular pocket and secured loosely to the pectoralis fascia using non-resorbable sutures  All the wounds were thoroughly irrigated with bacitracin irrigation  The wounds were then closed in three layers with deep layers closed with 3-0 Vicryl and the skin closed with 4-0 Monocryl  Wound dressings were placed  The patient was returned to the care of Anesthesia, extubated without difficulty, and taken to the recovery area in stable condition  All instruments and sponge counts were correct at the end of the procedure  Marshal Sandifer, MD, was present for and performed all key elements of the procedure       I was present for the entire procedure    Patient Disposition:  PACU  and extubated and stable    SIGNATURE: Amanda Doshi MD  DATE: September 8, 2021  TIME: 9:28 AM

## 2021-09-08 NOTE — INTERVAL H&P NOTE
H&P reviewed  After examining the patient I find no changes in the patients condition since the H&P had been written      Vitals:    09/08/21 0546   BP: 122/74   Pulse: 78   Resp: 18   Temp: (!) 97 2 °F (36 2 °C)   SpO2: 97%

## 2021-09-08 NOTE — ANESTHESIA POSTPROCEDURE EVALUATION
Post-Op Assessment Note    CV Status:  Stable    Pain management: adequate     Mental Status:  Alert and awake   Hydration Status:  Euvolemic   PONV Controlled:  Controlled   Airway Patency:  Patent      Post Op Vitals Reviewed: Yes      Staff: Anesthesiologist         No complications documented      /78 (09/08/21 1009)    Temp      Pulse 90 (09/08/21 1009)   Resp 15 (09/08/21 1009)    SpO2 94 % (09/08/21 1009)

## 2021-09-08 NOTE — H&P
Iliana Rivera is a 76 y o male who presents for re-evaluation of sleep apnea  He underwent sleep endoscopy and showed AP collapse which would benefit from [place]  He would like to move forward with surgery  He had COVID with pneumonia in Dec  He has been going through pulm rehab  Doing well  Had Nuclear med stress test      Past Medical History:   Diagnosis Date    Allergic rhinitis     Arthritis     Asthma     COVID-19 12/2020    [facility]     Myocardial infarction ([facility]     2012 / 2018    Sleep apnea     does not use C-PAP       /74   Pulse 78   Temp (!) 97 2 °F (36 2 °C) (Temporal)   Resp 18   Ht 5' 11" (1 803 m)   Wt 107 kg (236 lb)   SpO2 97%   BMI 32 92 kg/m²       Physical Exam   Constitutional: Oriented to person, place, and time  Well-developed and well-nourished, no apparent distress, non-toxic appearance  Cooperative, able to hear and answer questions without difficulty  Voice: Normal voice quality  Head: Normocephalic, atraumatic  No scars, masses or lesions  Face: Symmetric, no edema, no sinus tenderness  Eyes: Vision grossly intact, extra-ocular movement intact  Ears: External ear normal   Bilateral tympanic membranes are intact with intact normal landmarks  No post-auricular erythema or tenderness  Nose: Septum midline, nares clear  Mucosa moist, turbinates well appearing  No crusting, polyps or discharge evident  Oral cavity: Dentition intact  Mucosa moist, lips normal   Tongue mobile, floor of mouth normal   Hard palate unremarkable  No masses or lesions  Oropharynx: Uvula is midline, soft palate normal   Unremarkable oropharyngeal inlet  Tonsils unremarkable  Posterior pharyngeal wall clear  No masses or lesions  Salivary glands:  Parotid glands and submandibular glands symmetric, no enlargement or tenderness  Neck: Normal laryngeal elevation with swallow  Trachea midline  No masses or lesions  No palpable adenopathy    Thyroid: normal in size, unremarkable without tenderness or palpable nodules  Pulmonary/Chest: Normal effort and rate  No respiratory distress  Musculoskeletal: Normal range of motion  Neurological: Cranial nerves 2-12 intact  Skin: Skin is warm and dry  Psychiatric: Normal mood and affect  CTAB  RRR  Abd soft NTND    A/P: Obstructive sleep apnea: We discussed the nature of obstructive sleep apnea  We discussed the natural history of sleep apnea  We discussed options for management  We discussed non-surgical management including weight loss, mandibular advancement devices, and positive airway pressure therapy including various options  We discussed that he is not tolerating his CPAP and has at least moderate MILDRED with an AHI of 17 1 and BMI of 33 75, he would like to move forward with Inspire hypoglossal nerve stimulator  Risks, benefits, and alternatives were discussed  Will seek his insurance approval and have him return in follow up for any further discussion

## 2021-10-20 ENCOUNTER — OFFICE VISIT (OUTPATIENT)
Dept: SLEEP CENTER | Facility: CLINIC | Age: 66
End: 2021-10-20
Payer: MEDICARE

## 2021-10-20 VITALS
BODY MASS INDEX: 33.46 KG/M2 | HEIGHT: 71 IN | HEART RATE: 84 BPM | WEIGHT: 239 LBS | SYSTOLIC BLOOD PRESSURE: 110 MMHG | DIASTOLIC BLOOD PRESSURE: 70 MMHG

## 2021-10-20 DIAGNOSIS — J45.909 UNCOMPLICATED ASTHMA, UNSPECIFIED ASTHMA SEVERITY, UNSPECIFIED WHETHER PERSISTENT: ICD-10-CM

## 2021-10-20 DIAGNOSIS — R06.83 SNORING: ICD-10-CM

## 2021-10-20 DIAGNOSIS — G47.19 EXCESSIVE DAYTIME SLEEPINESS: ICD-10-CM

## 2021-10-20 DIAGNOSIS — G47.33 OBSTRUCTIVE SLEEP APNEA: Primary | ICD-10-CM

## 2021-10-20 PROCEDURE — 99205 OFFICE O/P NEW HI 60 MIN: CPT | Performed by: INTERNAL MEDICINE

## 2021-10-28 ENCOUNTER — APPOINTMENT (OUTPATIENT)
Dept: LAB | Facility: CLINIC | Age: 66
End: 2021-10-28
Payer: MEDICARE

## 2021-10-28 DIAGNOSIS — G47.33 OBSTRUCTIVE SLEEP APNEA: ICD-10-CM

## 2021-10-28 DIAGNOSIS — E03.9 HYPOTHYROIDISM, UNSPECIFIED TYPE: ICD-10-CM

## 2021-10-28 LAB
BASOPHILS # BLD AUTO: 0.11 THOUSANDS/ΜL (ref 0–0.1)
BASOPHILS NFR BLD AUTO: 1 % (ref 0–1)
EOSINOPHIL # BLD AUTO: 0.27 THOUSAND/ΜL (ref 0–0.61)
EOSINOPHIL NFR BLD AUTO: 4 % (ref 0–6)
ERYTHROCYTE [DISTWIDTH] IN BLOOD BY AUTOMATED COUNT: 13.1 % (ref 11.6–15.1)
HCT VFR BLD AUTO: 50.6 % (ref 36.5–49.3)
HGB BLD-MCNC: 16.6 G/DL (ref 12–17)
IMM GRANULOCYTES # BLD AUTO: 0.02 THOUSAND/UL (ref 0–0.2)
IMM GRANULOCYTES NFR BLD AUTO: 0 % (ref 0–2)
LYMPHOCYTES # BLD AUTO: 2.01 THOUSANDS/ΜL (ref 0.6–4.47)
LYMPHOCYTES NFR BLD AUTO: 26 % (ref 14–44)
MCH RBC QN AUTO: 30.4 PG (ref 26.8–34.3)
MCHC RBC AUTO-ENTMCNC: 32.8 G/DL (ref 31.4–37.4)
MCV RBC AUTO: 93 FL (ref 82–98)
MONOCYTES # BLD AUTO: 0.92 THOUSAND/ΜL (ref 0.17–1.22)
MONOCYTES NFR BLD AUTO: 12 % (ref 4–12)
NEUTROPHILS # BLD AUTO: 4.32 THOUSANDS/ΜL (ref 1.85–7.62)
NEUTS SEG NFR BLD AUTO: 57 % (ref 43–75)
NRBC BLD AUTO-RTO: 0 /100 WBCS
PLATELET # BLD AUTO: 215 THOUSANDS/UL (ref 149–390)
PMV BLD AUTO: 10.5 FL (ref 8.9–12.7)
RBC # BLD AUTO: 5.46 MILLION/UL (ref 3.88–5.62)
T4 FREE SERPL-MCNC: 1.1 NG/DL (ref 0.76–1.46)
TSH SERPL DL<=0.05 MIU/L-ACNC: 2.08 UIU/ML (ref 0.36–3.74)
WBC # BLD AUTO: 7.65 THOUSAND/UL (ref 4.31–10.16)

## 2021-10-28 PROCEDURE — 84443 ASSAY THYROID STIM HORMONE: CPT

## 2021-10-28 PROCEDURE — 36415 COLL VENOUS BLD VENIPUNCTURE: CPT

## 2021-10-28 PROCEDURE — 84439 ASSAY OF FREE THYROXINE: CPT

## 2021-10-28 PROCEDURE — 85025 COMPLETE CBC W/AUTO DIFF WBC: CPT

## 2021-11-02 ENCOUNTER — TELEPHONE (OUTPATIENT)
Dept: SLEEP CENTER | Facility: CLINIC | Age: 66
End: 2021-11-02

## 2021-11-10 ENCOUNTER — TELEPHONE (OUTPATIENT)
Dept: SLEEP CENTER | Facility: CLINIC | Age: 66
End: 2021-11-10

## 2021-11-29 ENCOUNTER — OFFICE VISIT (OUTPATIENT)
Dept: SLEEP CENTER | Facility: CLINIC | Age: 66
End: 2021-11-29
Payer: MEDICARE

## 2021-11-29 VITALS
HEIGHT: 71 IN | HEART RATE: 95 BPM | SYSTOLIC BLOOD PRESSURE: 126 MMHG | BODY MASS INDEX: 33.88 KG/M2 | WEIGHT: 242 LBS | DIASTOLIC BLOOD PRESSURE: 69 MMHG

## 2021-11-29 DIAGNOSIS — G47.19 EXCESSIVE DAYTIME SLEEPINESS: ICD-10-CM

## 2021-11-29 DIAGNOSIS — J45.909 UNCOMPLICATED ASTHMA, UNSPECIFIED ASTHMA SEVERITY, UNSPECIFIED WHETHER PERSISTENT: ICD-10-CM

## 2021-11-29 DIAGNOSIS — E66.9 CLASS 1 OBESITY WITH SERIOUS COMORBIDITY AND BODY MASS INDEX (BMI) OF 33.0 TO 33.9 IN ADULT, UNSPECIFIED OBESITY TYPE: ICD-10-CM

## 2021-11-29 DIAGNOSIS — R06.83 SNORING: ICD-10-CM

## 2021-11-29 DIAGNOSIS — G47.33 OBSTRUCTIVE SLEEP APNEA: Primary | ICD-10-CM

## 2021-11-29 PROBLEM — E66.811 CLASS 1 OBESITY WITH SERIOUS COMORBIDITY AND BODY MASS INDEX (BMI) OF 33.0 TO 33.9 IN ADULT: Status: ACTIVE | Noted: 2021-11-29

## 2021-11-29 PROCEDURE — 99215 OFFICE O/P EST HI 40 MIN: CPT | Performed by: INTERNAL MEDICINE

## 2022-01-26 ENCOUNTER — HOSPITAL ENCOUNTER (OUTPATIENT)
Dept: SLEEP CENTER | Facility: CLINIC | Age: 67
Discharge: HOME | End: 2022-01-26
Payer: MEDICARE

## 2022-01-26 DIAGNOSIS — G47.33 OBSTRUCTIVE SLEEP APNEA: ICD-10-CM

## 2022-01-26 PROCEDURE — 95977 ALYS CPLX CN NPGT PRGRMG: CPT

## 2022-01-26 PROCEDURE — 95810 POLYSOM 6/> YRS 4/> PARAM: CPT

## 2022-01-26 PROCEDURE — 95810 POLYSOM 6/> YRS 4/> PARAM: CPT | Performed by: INTERNAL MEDICINE

## 2022-01-27 NOTE — PROGRESS NOTES
Sleep Study Documentation    Pre-Sleep Study       Sleep testing procedure explained to patient:YES    Patient napped prior to study:YES- more than 30 minutes  Napped after 2PM: no    Caffeine:Dayshift worker after 12PM   Caffeine use:YES- coffee  6 to 18 ounces    Alcohol:Dayshift workers after 5PM: Alcohol use:NO    Typical day for patient:NO       Study Documentation    Sleep Study Indications: EDS, nocturnal choking  Sleep Study: Treatment   Optimal amplitude: 3 4V    Snore:Eliminated  REM Obtained:yes  Supplemental O2: no    Minimum SaO2 91  Baseline SaO2 97    Final voltage at which snoring was eliminated 3 4V  Minimum SaO2 at final voltage 93    Mode of Therapy: Inspire Fine Tune      EKG abnormalities: no     EEG abnormalities: no    Sleep Study Recorded < 2 hours: N/A    Sleep Study Recorded > 2 hours but incomplete study: N/A    Sleep Study Recorded 6 hours but no sleep obtained: NO    Patient classification: retired       Post-Sleep Study    Medication used at bedtime or during sleep study:NO    Patient reports time it took to fall asleep:20 to 30 minutes    Patient reports waking up during study:3 or more times  Patient reports returning to sleep in 10 to 30 minutes  Patient reports sleeping 2 to 4 hours with dreaming  Patient reports sleep during study:worse than usual    Patient rated sleepiness: Very sleepy or tired    PAP treatment:no

## 2022-02-01 ENCOUNTER — TELEPHONE (OUTPATIENT)
Dept: SLEEP CENTER | Facility: CLINIC | Age: 67
End: 2022-02-01

## 2022-02-01 NOTE — TELEPHONE ENCOUNTER
Left voice message for patient  Advised sleep study resulted and will be discussed with him at follow up 2/16/22 with Dr Kumar Krishnamurthy  Provided date and time of appointment in message

## 2022-02-14 ENCOUNTER — APPOINTMENT (OUTPATIENT)
Dept: LAB | Facility: CLINIC | Age: 67
End: 2022-02-14
Payer: MEDICARE

## 2022-02-14 DIAGNOSIS — I10 HYPERTENSION, UNSPECIFIED TYPE: ICD-10-CM

## 2022-02-14 DIAGNOSIS — Z79.818 LONG TERM (CURRENT) USE OF OTHER AGENTS AFFECTING ESTROGEN RECEPTORS AND ESTROGEN LEVELS: ICD-10-CM

## 2022-02-14 DIAGNOSIS — R73.9 HYPERGLYCEMIA: ICD-10-CM

## 2022-02-14 DIAGNOSIS — R53.83 FATIGUE, UNSPECIFIED TYPE: ICD-10-CM

## 2022-02-14 DIAGNOSIS — E55.9 VITAMIN D DEFICIENCY: ICD-10-CM

## 2022-02-14 DIAGNOSIS — E78.5 HYPERLIPIDEMIA, UNSPECIFIED HYPERLIPIDEMIA TYPE: ICD-10-CM

## 2022-02-14 LAB
25(OH)D3 SERPL-MCNC: 50.8 NG/ML (ref 30–100)
ALBUMIN SERPL BCP-MCNC: 3.3 G/DL (ref 3.5–5)
ALP SERPL-CCNC: 98 U/L (ref 46–116)
ALT SERPL W P-5'-P-CCNC: 26 U/L (ref 12–78)
ANION GAP SERPL CALCULATED.3IONS-SCNC: 5 MMOL/L (ref 4–13)
AST SERPL W P-5'-P-CCNC: 20 U/L (ref 5–45)
BILIRUB SERPL-MCNC: 0.4 MG/DL (ref 0.2–1)
BUN SERPL-MCNC: 11 MG/DL (ref 5–25)
CALCIUM ALBUM COR SERPL-MCNC: 9.5 MG/DL (ref 8.3–10.1)
CALCIUM SERPL-MCNC: 8.9 MG/DL (ref 8.3–10.1)
CHLORIDE SERPL-SCNC: 109 MMOL/L (ref 100–108)
CHOLEST SERPL-MCNC: 117 MG/DL
CO2 SERPL-SCNC: 25 MMOL/L (ref 21–32)
CREAT SERPL-MCNC: 1.04 MG/DL (ref 0.6–1.3)
ERYTHROCYTE [DISTWIDTH] IN BLOOD BY AUTOMATED COUNT: 13 % (ref 11.6–15.1)
EST. AVERAGE GLUCOSE BLD GHB EST-MCNC: 105 MG/DL
GFR SERPL CREATININE-BSD FRML MDRD: 73 ML/MIN/1.73SQ M
GLUCOSE P FAST SERPL-MCNC: 110 MG/DL (ref 65–99)
HBA1C MFR BLD: 5.3 %
HCT VFR BLD AUTO: 47.1 % (ref 36.5–49.3)
HDLC SERPL-MCNC: 39 MG/DL
HGB BLD-MCNC: 15.5 G/DL (ref 12–17)
LDLC SERPL CALC-MCNC: 57 MG/DL (ref 0–100)
MCH RBC QN AUTO: 29.6 PG (ref 26.8–34.3)
MCHC RBC AUTO-ENTMCNC: 32.9 G/DL (ref 31.4–37.4)
MCV RBC AUTO: 90 FL (ref 82–98)
NONHDLC SERPL-MCNC: 78 MG/DL
PLATELET # BLD AUTO: 167 THOUSANDS/UL (ref 149–390)
PMV BLD AUTO: 10.2 FL (ref 8.9–12.7)
POTASSIUM SERPL-SCNC: 4.2 MMOL/L (ref 3.5–5.3)
PROT SERPL-MCNC: 7 G/DL (ref 6.4–8.2)
RBC # BLD AUTO: 5.23 MILLION/UL (ref 3.88–5.62)
SODIUM SERPL-SCNC: 139 MMOL/L (ref 136–145)
T4 FREE SERPL-MCNC: 1.2 NG/DL (ref 0.76–1.46)
TRIGL SERPL-MCNC: 104 MG/DL
TSH SERPL DL<=0.05 MIU/L-ACNC: 1.96 UIU/ML (ref 0.36–3.74)
WBC # BLD AUTO: 4.63 THOUSAND/UL (ref 4.31–10.16)

## 2022-02-14 PROCEDURE — 80061 LIPID PANEL: CPT

## 2022-02-14 PROCEDURE — 84439 ASSAY OF FREE THYROXINE: CPT

## 2022-02-14 PROCEDURE — 82306 VITAMIN D 25 HYDROXY: CPT

## 2022-02-14 PROCEDURE — 80053 COMPREHEN METABOLIC PANEL: CPT

## 2022-02-14 PROCEDURE — 84443 ASSAY THYROID STIM HORMONE: CPT

## 2022-02-14 PROCEDURE — 85027 COMPLETE CBC AUTOMATED: CPT

## 2022-02-14 PROCEDURE — 36415 COLL VENOUS BLD VENIPUNCTURE: CPT

## 2022-02-14 PROCEDURE — 83036 HEMOGLOBIN GLYCOSYLATED A1C: CPT

## 2022-02-16 ENCOUNTER — OFFICE VISIT (OUTPATIENT)
Dept: SLEEP CENTER | Facility: CLINIC | Age: 67
End: 2022-02-16
Payer: MEDICARE

## 2022-02-16 ENCOUNTER — APPOINTMENT (OUTPATIENT)
Dept: LAB | Facility: CLINIC | Age: 67
End: 2022-02-16
Payer: MEDICARE

## 2022-02-16 VITALS
HEART RATE: 83 BPM | HEIGHT: 71 IN | BODY MASS INDEX: 33.18 KG/M2 | DIASTOLIC BLOOD PRESSURE: 61 MMHG | WEIGHT: 237 LBS | SYSTOLIC BLOOD PRESSURE: 131 MMHG

## 2022-02-16 DIAGNOSIS — G47.61 PERIODIC LIMB MOVEMENT DISORDER: ICD-10-CM

## 2022-02-16 DIAGNOSIS — E53.8 B12 DEFICIENCY: ICD-10-CM

## 2022-02-16 DIAGNOSIS — G47.19 EXCESSIVE DAYTIME SLEEPINESS: ICD-10-CM

## 2022-02-16 DIAGNOSIS — G47.61 PERIODIC LIMB MOVEMENT DISORDER: Primary | ICD-10-CM

## 2022-02-16 DIAGNOSIS — G47.33 OBSTRUCTIVE SLEEP APNEA SYNDROME: Primary | ICD-10-CM

## 2022-02-16 DIAGNOSIS — E61.1 IRON DEFICIENCY: ICD-10-CM

## 2022-02-16 DIAGNOSIS — E66.9 OBESITY WITHOUT SERIOUS COMORBIDITY, UNSPECIFIED CLASSIFICATION, UNSPECIFIED OBESITY TYPE: ICD-10-CM

## 2022-02-16 DIAGNOSIS — J45.40 MODERATE PERSISTENT ASTHMA WITHOUT COMPLICATION: ICD-10-CM

## 2022-02-16 PROBLEM — Z01.811 PREOP PULMONARY/RESPIRATORY EXAM: Status: ACTIVE | Noted: 2019-09-09

## 2022-02-16 PROBLEM — S42.90XA: Status: ACTIVE | Noted: 2020-01-22

## 2022-02-16 PROBLEM — G89.29 CHRONIC BACK PAIN: Status: ACTIVE | Noted: 2017-08-04

## 2022-02-16 PROBLEM — H35.30 MACULAR DEGENERATION: Status: ACTIVE | Noted: 2020-01-22

## 2022-02-16 PROBLEM — J30.9 ALLERGIC RHINITIS: Status: ACTIVE | Noted: 2017-02-03

## 2022-02-16 PROBLEM — U07.1 PNEUMONIA DUE TO COVID-19 VIRUS: Status: ACTIVE | Noted: 2020-12-05

## 2022-02-16 PROBLEM — R05.9 COUGH: Status: ACTIVE | Noted: 2018-11-02

## 2022-02-16 PROBLEM — Z98.61 S/P PTCA (PERCUTANEOUS TRANSLUMINAL CORONARY ANGIOPLASTY): Status: ACTIVE | Noted: 2018-10-23

## 2022-02-16 PROBLEM — S46.009A ROTATOR CUFF INJURY: Status: ACTIVE | Noted: 2019-11-15

## 2022-02-16 PROBLEM — M54.50 CHRONIC LOW BACK PAIN: Status: ACTIVE | Noted: 2019-07-15

## 2022-02-16 PROBLEM — M53.86 DISORDER OF LUMBAR SPINE: Status: ACTIVE | Noted: 2021-11-10

## 2022-02-16 PROBLEM — M54.9 CHRONIC BACK PAIN: Status: ACTIVE | Noted: 2017-08-04

## 2022-02-16 PROBLEM — M54.12 CERVICAL RADICULOPATHY: Status: ACTIVE | Noted: 2019-07-15

## 2022-02-16 PROBLEM — G89.29 CHRONIC LOW BACK PAIN: Status: ACTIVE | Noted: 2019-07-15

## 2022-02-16 PROBLEM — J12.82 PNEUMONIA DUE TO COVID-19 VIRUS: Status: ACTIVE | Noted: 2020-12-05

## 2022-02-16 LAB
FERRITIN SERPL-MCNC: 186 NG/ML (ref 8–388)
IRON SATN MFR SERPL: 26 % (ref 20–50)
IRON SERPL-MCNC: 77 UG/DL (ref 65–175)
TIBC SERPL-MCNC: 296 UG/DL (ref 250–450)
VIT B12 SERPL-MCNC: 580 PG/ML (ref 100–900)

## 2022-02-16 PROCEDURE — 36415 COLL VENOUS BLD VENIPUNCTURE: CPT

## 2022-02-16 PROCEDURE — 83550 IRON BINDING TEST: CPT

## 2022-02-16 PROCEDURE — 99215 OFFICE O/P EST HI 40 MIN: CPT | Performed by: INTERNAL MEDICINE

## 2022-02-16 PROCEDURE — 83540 ASSAY OF IRON: CPT

## 2022-02-16 PROCEDURE — 82728 ASSAY OF FERRITIN: CPT

## 2022-02-16 PROCEDURE — 82607 VITAMIN B-12: CPT

## 2022-02-16 RX ORDER — SILDENAFIL 50 MG/1
TABLET, FILM COATED ORAL
COMMUNITY

## 2022-02-16 RX ORDER — BENZONATATE 200 MG/1
CAPSULE ORAL
COMMUNITY

## 2022-02-16 RX ORDER — BENZONATATE 100 MG/1
CAPSULE ORAL
COMMUNITY

## 2022-02-16 RX ORDER — LEVOTHYROXINE SODIUM 0.07 MG/1
TABLET ORAL
COMMUNITY

## 2022-02-16 RX ORDER — METHOCARBAMOL 750 MG/1
TABLET, FILM COATED ORAL
COMMUNITY

## 2022-02-16 RX ORDER — LEVOTHYROXINE SODIUM 0.07 MG/1
TABLET ORAL
COMMUNITY
Start: 2022-01-25

## 2022-02-16 RX ORDER — PRAMIPEXOLE DIHYDROCHLORIDE 0.25 MG/1
0.25 TABLET ORAL
Qty: 30 TABLET | Refills: 0 | Status: SHIPPED | OUTPATIENT
Start: 2022-02-16

## 2022-02-16 RX ORDER — DEXAMETHASONE 6 MG/1
TABLET ORAL
COMMUNITY

## 2022-02-16 RX ORDER — A/SINGAPORE/GP1908/2015 IVR-180 (AN A/MICHIGAN/45/2015 (H1N1)PDM09-LIKE VIRUS, A/HONG KONG/4801/2014, NYMC X-263B (H3N2) (AN A/HONG KONG/4801/2014-LIKE VIRUS), AND B/BRISBANE/60/2008, WILD TYPE (A B/BRISBANE/60/2008-LIKE VIRUS) 15; 15; 15 UG/.5ML; UG/.5ML; UG/.5ML
INJECTION, SUSPENSION INTRAMUSCULAR
COMMUNITY

## 2022-02-16 RX ORDER — SODIUM FLUORIDE 5 MG/G
GEL, DENTIFRICE DENTAL
COMMUNITY

## 2022-02-16 RX ORDER — ALBUTEROL SULFATE 2.5 MG/3ML
SOLUTION RESPIRATORY (INHALATION)
COMMUNITY
Start: 2021-12-07

## 2022-02-16 RX ORDER — DEXAMETHASONE 4 MG/1
TABLET ORAL
COMMUNITY

## 2022-02-16 RX ORDER — EVOLOCUMAB 140 MG/ML
INJECTION, SOLUTION SUBCUTANEOUS
COMMUNITY
Start: 2022-01-05

## 2022-02-16 RX ORDER — ZOSTER VACCINE RECOMBINANT, ADJUVANTED 50 MCG/0.5
KIT INTRAMUSCULAR
COMMUNITY

## 2022-02-16 RX ORDER — BENZONATATE 200 MG/1
200 CAPSULE ORAL 3 TIMES DAILY
COMMUNITY
Start: 2021-11-10

## 2022-02-16 NOTE — ASSESSMENT & PLAN NOTE
Despite the fact that his sleep disordered breathing events have gotten significantly better with using the inspire as shown in the sleep study, still has excessive daytime sleepiness and napping every day I would like to blame his leg movements for that for which I would like to order ferritin level and vitamin B12 levels if everything comes back negative and normal then I would consider a trial of dopamine agonist versus gabapentin

## 2022-02-16 NOTE — ASSESSMENT & PLAN NOTE
Severe sleep apnea, failed CPAP therapy in the past, underwent an inspire implantation, and titration followed by fine tuning in-lab study 3 months, that has shown significant reduction of AHI down from 30 events per hour under control night study to 2 events per hour with using inspire  The patient reportedly is not snoring anymore and he feels more energetic during the daytime, however in the afternoon he still tends to take a nap if he gets an opportunity he thinks that this is something that he developed after half-way  I adjusted his inspire today to at 3 4 volts amplitude, and patient control range of 3 2-4 2 pulse based on the sleep study results, start delay of 20 minutes, pause time of 15 and duration of therapy of 12 hours that has not changed

## 2022-02-16 NOTE — ASSESSMENT & PLAN NOTE
As detailed above in MILDRED, I do believe that the patient might have another etiology for this which is his periodic limb movements that found to be very high with a periodic limb movement index 96 9, noted that his vitamin-D was checked recently was normal, I wonder if there is iron deficiency/ferritin deficiency versus B12, I ordered both levels if everything comes back normal he would benefit from a trial of dopamine agonist I discussed that with him in details and will send the prescription if the lab results come back normal

## 2022-02-16 NOTE — PATIENT INSTRUCTIONS
Sleep Apnea   AMBULATORY CARE:   Sleep apnea  is a condition that causes you to stop breathing often during sleep  Types of sleep apnea:   · Obstructive sleep apnea (MILDRED)  is the most common kind  The muscles and tissues around your throat relax and block air from passing through  Obesity, use of alcohol or cigarettes, or a family history are common causes  MILDRED may increase your risk for complications after surgery  · Central sleep apnea (CSA)  means your brain does not send signals to the muscles that control breathing  You do not take a breath even though your airway is open  Common causes include medical conditions such as heart failure, being older than 40, or use of opioids  · Complex (or mixed) sleep apnea  means you have both obstructive and central sleep apnea  Common signs and symptoms:   · Loud snoring or long pauses in breathing    · Feeling sleepy, slow, and tired during the day    · Snorting, gasping, or choking while you sleep, and waking up suddenly because of these    · Feeling irritable during the day    · Dry mouth or a headache in the mornings    · Heavy night sweating    · A hard time thinking, remembering things, or focusing on your tasks the following day    Call your local emergency number (911 in the [address] [facility]) if:   · You have chest pain or trouble breathing  Call your doctor if:   · You have new or worsening signs or symptoms  · You have questions or concerns about your condition or care  Treatment  depends on the kind of apnea you have  · A mouth device  may be needed if you have mild sleep apnea  These are designed to keep your throat open  Ask your dentist or healthcare provider about the best mouth device for you  · A machine  may be used to help you get more air during sleep  A mask may be placed over your nose and mouth, or just your nose  The mask is hooked to the machine  You will get air through the mask      ? A continuous positive airway pressure (CPAP) machine is used to keep your airway open during sleep  The machine blows a gentle stream of air into the mask when you breathe  This helps keep your airway open so you can breathe more regularly  Extra oxygen may be given through the machine  ? A bilevel positive airway pressure (BiPAP) machine  gives air but lowers the pressure when you breathe out  ? An adaptive servo-ventilator (ASV)  is a machine that learns your usual breathing pattern  Then, it uses pressure to give you air and prevent stops in your breathing  · Surgery  to expand your airway or remove extra tissues may be needed  Surgery is usually only considered if other treatments do not work  Manage or prevent sleep apnea:   · Reach and maintain a healthy weight  Ask your healthcare provider what a healthy weight is for you  Ask him or her to help you create a safe weight loss plan if you are overweight  Even a small goal of a 10% weight loss can improve your symptoms  · Do not smoke  Nicotine and other chemicals in cigarettes and cigars can cause lung damage  Ask your healthcare provider for information if you currently smoke and need help to quit  E-cigarettes or smokeless tobacco still contain nicotine  Talk to your healthcare provider before you use these products  · Do not drink alcohol or take sedative medicine before you go to sleep  Alcohol and sedatives can relax the muscles and tissues around your throat  This can block the airflow to your lungs  · Sleep on your side or use pillows designed to prevent sleep apnea  This prevents your tongue or other tissues from blocking your throat  You can also raise the head of your bed  Follow up with your doctor or specialist as directed: You may need to have blood tests during your follow-up visits  Work with your provider to find the right breathing support equipment and settings for you  Write down your questions so you remember to ask them during your visits    © Copyright [employer] 2021 Information is for [employer] use only and may not be sold, redistributed or otherwise used for commercial purposes  All illustrations and images included in CareNotes® are the copyrighted property of A D A M [employer] Inc  or Zita Pearce  The above information is an  only  It is not intended as medical advice for individual conditions or treatments  Talk to your doctor, nurse or pharmacist before following any medical regimen to see if it is safe and effective for you

## 2022-02-16 NOTE — PROGRESS NOTES
Pulmonary/Sleep Follow Up Note   Karishma Farr 78 y o  male MRN: [id number]  2/16/2022      Assessment and Plan:    1  Obstructive sleep apnea syndrome  Assessment & Plan:  Severe sleep apnea, failed CPAP therapy in the past, underwent an inspire implantation, and titration followed by fine tuning in-lab study 3 months, that has shown significant reduction of AHI down from 30 events per hour under control night study to 2 events per hour with using inspire  The patient reportedly is not snoring anymore and he feels more energetic during the daytime, however in the afternoon he still tends to take a nap if he gets an opportunity he thinks that this is something that he developed after custodial  I adjusted his inspire today to at 3 4 volts amplitude, and patient control range of 3 2-4 2 pulse based on the sleep study results, start delay of 20 minutes, pause time of 15 and duration of therapy of 12 hours that has not changed      2  Iron deficiency  Assessment & Plan:  Rule out ferritin deficiency as detailed above    Orders:  -     Iron, TIBC and Ferritin Panel; Future    3  B12 deficiency  Assessment & Plan:  Rule out B12 deficiency as detailed above    Orders:  -     Vitamin B12; Future    4  Moderate persistent asthma without complication  Assessment & Plan:  Is currently controlled on Advair Diskus and using albuterol as needed      5  Obesity without serious comorbidity, unspecified classification, unspecified obesity type  Assessment & Plan:  BMI 33 05, he would benefit from weight loss      6   Excessive daytime sleepiness  Assessment & Plan:  As detailed above in MILDRED, I do believe that the patient might have another etiology for this which is his periodic limb movements that found to be very high with a periodic limb movement index 96 9, noted that his vitamin-D was checked recently was normal, I wonder if there is iron deficiency/ferritin deficiency versus B12, I ordered both levels if everything comes back normal he would benefit from a trial of dopamine agonist I discussed that with him in details and will send the prescription if the lab results come back normal      7  Periodic limb movement disorder  Assessment & Plan:  Despite the fact that his sleep disordered breathing events have gotten significantly better with using the inspire as shown in the sleep study, still has excessive daytime sleepiness and napping every day I would like to blame his leg movements for that for which I would like to order ferritin level and vitamin B12 levels if everything comes back negative and normal then I would consider a trial of dopamine agonist versus gabapentin        Return in about 6 months (around 8/16/2022)  History of Present Illness   HPI:  Janes Galeano is a 78 y o  male who is here for follow-up appointment the patient has history of severe MILDRED with an AHI of 30 events per hour underwent inspire/hypoglossal nerve stimulation activation he underwent a fine tuning study 3 months afterwards with significant improvement of his sleep disordered breathing events down to a 2 events per hour  He tells me his wife has been noting that he is not snoring anymore and that his sleep quality has been significantly improved  He still has 3 awakenings throughout the night for using bathroom he has not seen urologist for his erectile dysfunction and nocturia I wonder if there is a concern for prostate hyperplasia  He noted to take naps in the afternoon if he gets an opportunity he is not sure that is out of boredom or just been tired    His Belford Scale is down to 1/24        Review of Systems      Genitourinary need to urinate more than twice a night and difficulty with erection   Cardiology none   Gastrointestinal none   Neurology none   Constitutional none   Integumentary none   Psychiatry none   Musculoskeletal joint pain, muscle aches and legs twitching/jerking   Pulmonary none   ENT none   Endocrine none   Hematological none           Historical Information   Past Medical History:   Diagnosis Date    Allergic rhinitis     Arthritis     Asthma     COVID-19 12/2020    Heart attack ([address] )     Hypertensive disorder 7/15/2013    Myocardial infarction [facility])     2012 / 2018    Sleep apnea     does not use C-PAP     Past Surgical History:   Procedure Laterality Date    CORONARY ARTERY BYPASS GRAFT  2012    2 stents   2018   stent / 2012 3 vessel bypass    EXAMINATION UNDER ANESTHESIA N/A 9/24/2020    Procedure: DRUG INDUCED SLEEP ENDOSCOPY;  Surgeon: Johann Chicas MD;  Location: [facility];  Service: ENT    JOINT REPLACEMENT      CO INCISION IMPLANT CRANIAL NERVE STIM ELECTRODE/PULSE GEN N/A 9/8/2021    Procedure: INSERTION UPPER AIRWAY STIMULATOR, INSPIRE IMPLANT;  Surgeon: Johann Chicas MD;  Location: [facility];  Service: ENT    REPLACEMENT TOTAL KNEE BILATERAL      ROTATOR CUFF REPAIR       Family History   Problem Relation Age of Onset    Cancer Brother     Melanoma Brother          Meds/Allergies     Current Outpatient Medications:     albuterol (PROVENTIL HFA) 90 mcg/act inhaler, Inhale 1-2 puffs, Disp: , Rfl:     aspirin 81 MG tablet, Take 1 tablet by mouth daily, Disp: , Rfl:     clopidogrel (PLAVIX) 75 mg tablet, clopidogrel 75 mg tablet, Disp: , Rfl:     fenofibrate (TRICOR) 54 MG tablet, Take 75 mg by mouth daily , Disp: , Rfl:     fluticasone-salmeterol (ADVAIR DISKUS) 250-50 mcg/dose inhaler, Inhale 1 puff 2 (two) times a day, Disp: , Rfl:     metoprolol succinate (TOPROL XL) 25 mg 24 hr tablet, Toprol XL 25 mg tablet,extended release, Disp: , Rfl:     mometasone (ELOCON) 0 1 % lotion, Apply topically as needed (2 drops each ear canal once daily as needed) 2 drops to each ear canal as needed, Disp: 60 mL, Rfl: 3    montelukast (SINGULAIR) 10 mg tablet, montelukast 10 mg tablet, Disp: , Rfl:     Multiple Vitamins-Minerals (VITAMIN D3 COMPLETE PO), Daily, Disp: , Rfl:     nitroglycerin (NITROSTAT) 0 4 mg SL tablet, nitroglycerin 0 4 mg sublingual tablet  PLACE 1 TABLET (0 4 MG) BY SUBLINGUAL ROUTE EVERY 5 MINUTES AS NEEDEDFOR CHEST PAIN  DO NOT EXCEED 3 DOSES IN 15 MINUTES , Disp: , Rfl:     OMEGA-3 FATTY ACIDS PO, 360mg, Disp: , Rfl:     omeprazole (PriLOSEC) 20 mg delayed release capsule, Take 20 mg by mouth daily, Disp: , Rfl:     tiotropium (SPIRIVA) 18 mcg inhalation capsule, Place 18 mcg into inhaler and inhale daily, Disp: , Rfl:     albuterol (2 5 mg/3 mL) 0 083 % nebulizer solution, inhale contents of 1 vial ( 3 milliliters ) in nebulizer by mouth     (REFER TO PRESCRIPTION NOTES)  , Disp: , Rfl:     benzonatate (TESSALON PERLES) 100 mg capsule, benzonatate 100 mg capsule, Disp: , Rfl:     benzonatate (TESSALON) 200 MG capsule, benzonatate 200 mg capsule  take 1 capsule by mouth three times a day, Disp: , Rfl:     benzonatate (TESSALON) 200 MG capsule, Take 200 mg by mouth 3 (three) times a day, Disp: , Rfl:     Cholecalciferol 50 MCG (2000 UT) CAPS, Daily, Disp: , Rfl:     dexamethasone (DECADRON) 4 mg tablet, dexamethasone 4 mg tablet  TAKE 1 TABLET BY MOUTH TWICE A DAY, Disp: , Rfl:     dexamethasone (DECADRON) 6 mg tablet, dexamethasone 6 mg tablet  TAKE 1 TABLET BY MOUTH TWICE A DAY, Disp: , Rfl:     Influenza Vac A&B SA Adj Quad (Fluad Quadrivalent) 0 5 ML [facility] Quad [id number]([id number])(PF) 60 mcg (15 mcg x 4)/0 5mL IM syringe  inject 0 5 milliliters intramuscularly, Disp: , Rfl:     levothyroxine 50 mcg tablet, Take 50 mcg by mouth daily, Disp: , Rfl:     levothyroxine 75 mcg tablet, , Disp: , Rfl:     levothyroxine 75 mcg tablet, , Disp: , Rfl:     methocarbamol (ROBAXIN) 750 mg tablet, methocarbamol 750 mg tablet  take 1 tablet by mouth nightly if needed for muscle spasm, Disp: , Rfl:     predniSONE 5 mg tablet, Take by mouth daily (Patient not taking: Reported on 11/29/2021 ), Disp: , Rfl:     Repatha SureClick 101 MG/ML SOAJ, , Disp: , Rfl:     sildenafil (VIAGRA) 50 MG tablet, sildenafil 50 mg tablet  take 1 tablet by mouth once daily, Disp: , Rfl:     SODIUM FLUORIDE, DENTAL GEL, (PreviDent) 1 1 % GEL, PreviDent 5000 Booster Plus 1 1 % dental paste  BRUSH 1 TIME DAILY PREFERABLY BEFORE BED, DO NOT RINSE AFTER USE, Disp: , Rfl:     Zoster Vac Recomb Adjuvanted (Shingrix) 50 MCG/0 5ML SUSR, Shingrix (PF) 50 mcg/0 5 mL intramuscular suspension, kit, Disp: , Rfl:   Allergies   Allergen Reactions    Sulfa Antibiotics     Clarithromycin Rash    Penicillins Hives and Rash       Vitals: Blood pressure 131/61, pulse 83, height 5' 11" (1 803 m), weight 108 kg (237 lb)  Body mass index is 33 05 kg/m²  Physical Exam  General:  Awake alert and oriented x 3, conversant without conversational dyspnea, NAD, normal affect  HEENT:   Sclera noninjected, nonicteric OU, Nares patent,  no craniofacial abnormalities, Mucous membranes, moist, no oral lesions, normal dentition  NECK:  Trachea midline, no accessory muscle use, no stridor,  JVP not elevated  CARDIAC: Reg, single s1/S2, no m/r/g  PULM: CTA bilaterally no wheezing, rhonchi or rales  ABD: Soft nontender, nondistended, no rebound, no rigidity, no guarding  EXT: No cyanosis, no clubbing, no edema, normal capillary refill  NEURO: no focal neurologic deficits, AAOx3, moving all extremities appropriately    Labs: I have personally reviewed pertinent lab results  , ABG: No results found for: PHART, GZK9SCP, PO2ART, CPK0BYY, P1OOMXTH, BEART, SOURCE, BNP: No results found for: BNP, CBC: No results found for: WBC, HGB, HCT, MCV, PLT, ADJUSTEDWBC, MCH, MCHC, RDW, MPV, NRBC, CMP: No results found for: SODIUM, K, CL, CO2, ANIONGAP, BUN, CREATININE, GLUCOSE, CALCIUM, AST, ALT, ALKPHOS, PROT, BILITOT, EGFR, PT/INR: No results found for: PT, INR, Troponin: No results found for: TROPONINI  Lab Results   Component Value Date    WBC 4 63 02/14/2022    HGB 15 5 02/14/2022    HCT 47 1 02/14/2022    MCV 90 02/14/2022     02/14/2022     Lab Results   Component Value Date    CALCIUM 8 9 02/14/2022    K 4 2 02/14/2022    CO2 25 02/14/2022     (H) 02/14/2022    BUN 11 02/14/2022    CREATININE 1 04 02/14/2022     No results found for: IGE  Lab Results   Component Value Date    ALT 26 02/14/2022    AST 20 02/14/2022    ALKPHOS 98 02/14/2022         Sleep studies: I have personally reviewed pertinent reports  PSG with inspire 1/2022  The patient slept for a total 393 minutes representing sleep efficiency of 90%  He had significant reduction of sleep disordered breathing events down from an average AHI of 30 events per hour on the control night study to 1 5 events per hour with titrating the inspire up to 3 4 volts  Thus continuing to use inspire with patient control range of 3 2 to 4 2 volts with an amplitude of 3 4 volts would be recommended  In addition, the patient has increased number of periodic limb movements with a PLM index of 96 9 events per hour  Excessive daytime sleepiness persist after successful treatment of sleep disordered breathing; checking a CBC, BUN, creatinine, TSH, vitamin B12, vitamin-D, magnesium, iron studies including ferritin to rule out underlying metabolic disorders associated with periodic limb movement disorder would be recommended, clinical correlation suggested  Areli Dennis MD  [address] [place] [address] [facility]       Portions of the record may have been created with voice recognition software  Occasional wrong word or "sound a like" substitutions may have occurred due to the inherent limitations of voice recognition software  Read the chart carefully and recognize, using context, where substitutions have occurred

## 2022-02-16 NOTE — PROGRESS NOTES
Review of Systems      Genitourinary need to urinate more than twice a night and difficulty with erection   Cardiology none   Gastrointestinal none   Neurology none   Constitutional none   Integumentary none   Psychiatry none   Musculoskeletal joint pain, muscle aches and legs twitching/jerking   Pulmonary none   ENT none   Endocrine none   Hematological none

## 2022-02-17 ENCOUNTER — TELEPHONE (OUTPATIENT)
Dept: SLEEP CENTER | Facility: CLINIC | Age: 67
End: 2022-02-17

## 2022-02-17 NOTE — TELEPHONE ENCOUNTER
----- Message from Rodriguez Adams RN sent at 2/17/2022 10:38 AM EST -----    ----- Message -----  From: Kathy Barrett MD  Sent: 2/16/2022   5:18 PM EST  To: Rodriguez Adams RN    No need for iron supplement   The patient can start Mirapex 0 25 mg HS

## 2022-04-20 ENCOUNTER — TELEPHONE (OUTPATIENT)
Dept: SLEEP CENTER | Facility: CLINIC | Age: 67
End: 2022-04-20

## 2022-04-20 NOTE — TELEPHONE ENCOUNTER
Returned patient's call  Patient states his Inspire device is set too high and he needs it adjusted lower  Scheduled patient for first available appointment with Dr Kristen Perez on 5/4/22    Added to wait list

## 2022-05-04 ENCOUNTER — OFFICE VISIT (OUTPATIENT)
Dept: SLEEP CENTER | Facility: CLINIC | Age: 67
End: 2022-05-04
Payer: MEDICARE

## 2022-05-04 VITALS
HEART RATE: 79 BPM | SYSTOLIC BLOOD PRESSURE: 125 MMHG | WEIGHT: 234 LBS | BODY MASS INDEX: 32.76 KG/M2 | HEIGHT: 71 IN | DIASTOLIC BLOOD PRESSURE: 69 MMHG

## 2022-05-04 DIAGNOSIS — J45.40 MODERATE PERSISTENT ASTHMA WITHOUT COMPLICATION: ICD-10-CM

## 2022-05-04 DIAGNOSIS — G47.61 PERIODIC LIMB MOVEMENT DISORDER: ICD-10-CM

## 2022-05-04 DIAGNOSIS — G47.33 OBSTRUCTIVE SLEEP APNEA: ICD-10-CM

## 2022-05-04 DIAGNOSIS — G47.33 OBSTRUCTIVE SLEEP APNEA SYNDROME: Primary | ICD-10-CM

## 2022-05-04 PROCEDURE — 99214 OFFICE O/P EST MOD 30 MIN: CPT | Performed by: INTERNAL MEDICINE

## 2022-05-04 PROCEDURE — 95971 ALYS SMPL SP/PN NPGT W/PRGRM: CPT | Performed by: INTERNAL MEDICINE

## 2022-05-04 RX ORDER — GABAPENTIN 100 MG/1
100 CAPSULE ORAL
Qty: 30 CAPSULE | Refills: 0 | Status: SHIPPED | OUTPATIENT
Start: 2022-05-04

## 2022-05-04 NOTE — PATIENT INSTRUCTIONS
Sleep Apnea   AMBULATORY CARE:   Sleep apnea  is a condition that causes you to stop breathing often during sleep  Types of sleep apnea:   · Obstructive sleep apnea (MILDRED)  is the most common kind  The muscles and tissues around your throat relax and block air from passing through  Obesity, use of alcohol or cigarettes, or a family history are common causes  MILDRED may increase your risk for complications after surgery  · Central sleep apnea (CSA)  means your brain does not send signals to the muscles that control breathing  You do not take a breath even though your airway is open  Common causes include medical conditions such as heart failure, being older than 40, or use of opioids  · Complex (or mixed) sleep apnea  means you have both obstructive and central sleep apnea  Common signs and symptoms:   · Loud snoring or long pauses in breathing    · Feeling sleepy, slow, and tired during the day    · Snorting, gasping, or choking while you sleep, and waking up suddenly because of these    · Feeling irritable during the day    · Dry mouth or a headache in the mornings    · Heavy night sweating    · A hard time thinking, remembering things, or focusing on your tasks the following day    Call your local emergency number (911 in the [employer]) if:   · You have chest pain or trouble breathing  Call your doctor if:   · You have new or worsening signs or symptoms  · You have questions or concerns about your condition or care  Treatment  depends on the kind of apnea you have  · A mouth device  may be needed if you have mild sleep apnea  These are designed to keep your throat open  Ask your dentist or healthcare provider about the best mouth device for you  · A machine  may be used to help you get more air during sleep  A mask may be placed over your nose and mouth, or just your nose  The mask is hooked to the machine  You will get air through the mask      ? A continuous positive airway pressure (CPAP) machine  is used to keep your airway open during sleep  The machine blows a gentle stream of air into the mask when you breathe  This helps keep your airway open so you can breathe more regularly  Extra oxygen may be given through the machine  ? A bilevel positive airway pressure (BiPAP) machine  gives air but lowers the pressure when you breathe out  ? An adaptive servo-ventilator (ASV)  is a machine that learns your usual breathing pattern  Then, it uses pressure to give you air and prevent stops in your breathing  · Surgery  to expand your airway or remove extra tissues may be needed  Surgery is usually only considered if other treatments do not work  Manage or prevent sleep apnea:   · Reach and maintain a healthy weight  Ask your healthcare provider what a healthy weight is for you  Ask him or her to help you create a safe weight loss plan if you are overweight  Even a small goal of a 10% weight loss can improve your symptoms  · Do not smoke  Nicotine and other chemicals in cigarettes and cigars can cause lung damage  Ask your healthcare provider for information if you currently smoke and need help to quit  E-cigarettes or smokeless tobacco still contain nicotine  Talk to your healthcare provider before you use these products  · Do not drink alcohol or take sedative medicine before you go to sleep  Alcohol and sedatives can relax the muscles and tissues around your throat  This can block the airflow to your lungs  · Sleep on your side or use pillows designed to prevent sleep apnea  This prevents your tongue or other tissues from blocking your throat  You can also raise the head of your bed  Follow up with your doctor or specialist as directed: You may need to have blood tests during your follow-up visits  Work with your provider to find the right breathing support equipment and settings for you  Write down your questions so you remember to ask them during your visits    © Copyright [employer] 2022 Information is for End User's use only and may not be sold, redistributed or otherwise used for commercial purposes  All illustrations and images included in CareNotes® are the copyrighted property of [employer]  or Zita Pearce  The above information is an  only  It is not intended as medical advice for individual conditions or treatments  Talk to your doctor, nurse or pharmacist before following any medical regimen to see if it is safe and effective for you

## 2022-05-04 NOTE — PROGRESS NOTES
Review of Systems      Genitourinary difficulty with erection   Cardiology none   Gastrointestinal none   Neurology none   Constitutional none   Integumentary none   Psychiatry none   Musculoskeletal joint pain, muscle aches and legs twitching/jerking   Pulmonary none   ENT none   Endocrine none   Hematological none

## 2022-05-04 NOTE — PROGRESS NOTES
Pulmonary/Sleep Follow Up Note   Kyung Colunga 78 y o  male MRN: [id number]  5/4/2022      Assessment and Plan:    1  Obstructive sleep apnea syndrome  Assessment & Plan:  · Severe obstructive sleep apnea failed CPAP therapy in the past underwent inspire implantation with successful lowering his sleep disordered breathing events down from 30 on his baseline study to 2 events per hour with the we using the inspire  · He has been having some issues with the tongue movement feels that the voltage too high and he is here to adjust the inspire, I lowered his inspire voltage range from 2 4-3 4 and amplitude at 2 8  · Kept his start delay and pause time the same  · I would repeat a home sleep study in 2 months while using the inspire to assure that this voltage is sufficient to help lower his sleep disordered breathing events    Orders:  -     Home Study; Future    2  Periodic limb movement disorder  -     gabapentin (Neurontin) 100 mg capsule; Take 1 capsule (100 mg total) by mouth daily at bedtime    3  Obstructive sleep apnea    4  Moderate persistent asthma without complication  Assessment & Plan:  Has been controlled on Advair Diskus and using albuterol as needed        Return in about 3 months (around 8/4/2022)  History of Present Illness   HPI:  Kyung Colunga is a 78 y o  male who is here for follow-up appointment the patient has history of severe MILDRED with an AHI of 30 events per hour underwent inspire/hypoglossal nerve stimulation activation he underwent a fine tuning study 3 months afterwards with significant improvement of his sleep disordered breathing events down to a 2 events per hour  He tells me his wife has been noting that he is not snoring anymore and that his sleep quality has been significantly improved    Over the last 2 weeks has been noticing that his tongue movements has not been as comfortable as used to be and he is requesting to have adjustments and lowering voltage range of his inspire  Review of Systems      Genitourinary difficulty with erection   Cardiology none   Gastrointestinal none   Neurology none   Constitutional none   Integumentary none   Psychiatry none   Musculoskeletal joint pain, muscle aches and legs twitching/jerking   Pulmonary none   ENT none   Endocrine none   Hematological none             Historical Information   Past Medical History:   Diagnosis Date    Allergic rhinitis     Arthritis     Asthma     COVID-19 12/2020    [facility] attack ([facility] [address] [facility]     Hypertensive disorder 7/15/2013    Myocardial infarction ([facility] [address] [facility]     2012 / 2018    Sleep apnea     does not use C-PAP     Past Surgical History:   Procedure Laterality Date    CORONARY ARTERY BYPASS GRAFT  2012 2 stents   2018   stent / 2012 3 vessel bypass    EXAMINATION UNDER ANESTHESIA N/A 9/24/2020    Procedure: DRUG INDUCED SLEEP ENDOSCOPY;  Surgeon: Gely Barnhart MD;  [facility];  Service: ENT    JOINT REPLACEMENT      NC INCISION IMPLANT CRANIAL NERVE STIM ELECTRODE/PULSE GEN N/A 9/8/2021    Procedure: INSERTION UPPER AIRWAY STIMULATOR, INSPIRE IMPLANT;  Surgeon: Gely Barnhart MD;  [facility];  Service: ENT    REPLACEMENT TOTAL KNEE BILATERAL      ROTATOR CUFF REPAIR       Family History   Problem Relation Age of Onset    Cancer Brother     Melanoma Brother          Meds/Allergies     Current Outpatient Medications:     albuterol (2 5 mg/3 mL) 0 083 % nebulizer solution, inhale contents of 1 vial ( 3 milliliters ) in nebulizer by mouth     (REFER TO PRESCRIPTION NOTES)  , Disp: , Rfl:     albuterol (PROVENTIL HFA) 90 mcg/act inhaler, Inhale 1-2 puffs, Disp: , Rfl:     aspirin 81 MG tablet, Take 1 tablet by mouth daily, Disp: , Rfl:     Cholecalciferol 50 MCG (2000 UT) CAPS, Daily, Disp: , Rfl:     clopidogrel (PLAVIX) 75 mg tablet, clopidogrel 75 mg tablet, Disp: , Rfl:     fluticasone-salmeterol (ADVAIR DISKUS) 250-50 mcg/dose inhaler, Inhale 1 puff 2 (two) times a day, Disp: , Rfl:     Influenza Vac A&B SA Adj Quad (Fluad Quadrivalent) 0 5 ML Delilah Velasquez [id number](22DX up)(PF) 60 mcg (15 mcg x 4)/0 5mL IM syringe  inject 0 5 milliliters intramuscularly, Disp: , Rfl:     levothyroxine 75 mcg tablet, , Disp: , Rfl:     metoprolol succinate (TOPROL XL) 25 mg 24 hr tablet, Toprol XL 25 mg tablet,extended release, Disp: , Rfl:     mometasone (ELOCON) 0 1 % lotion, Apply topically as needed (2 drops each ear canal once daily as needed) 2 drops to each ear canal as needed, Disp: 60 mL, Rfl: 3    montelukast (SINGULAIR) 10 mg tablet, montelukast 10 mg tablet, Disp: , Rfl:     Multiple Vitamins-Minerals (VITAMIN D3 COMPLETE PO), Daily, Disp: , Rfl:     OMEGA-3 FATTY ACIDS PO, 360mg, Disp: , Rfl:     omeprazole (PriLOSEC) 20 mg delayed release capsule, Take 20 mg by mouth daily, Disp: , Rfl:     Repatha SureClick 469 MG/ML SOAJ, , Disp: , Rfl:     sildenafil (VIAGRA) 50 MG tablet, sildenafil 50 mg tablet  take 1 tablet by mouth once daily, Disp: , Rfl:     SODIUM FLUORIDE, DENTAL GEL, (PreviDent) 1 1 % GEL, PreviDent 5000 Booster Plus 1 1 % dental paste  BRUSH 1 TIME DAILY PREFERABLY BEFORE BED, DO NOT RINSE AFTER USE, Disp: , Rfl:     tiotropium (SPIRIVA) 18 mcg inhalation capsule, Place 18 mcg into inhaler and inhale daily, Disp: , Rfl:     Zoster Vac Recomb Adjuvanted (Shingrix) 50 MCG/0 5ML SUSR, Shingrix (PF) 50 mcg/0 5 mL intramuscular suspension, kit, Disp: , Rfl:     benzonatate (TESSALON PERLES) 100 mg capsule, benzonatate 100 mg capsule (Patient not taking: Reported on 5/4/2022), Disp: , Rfl:     benzonatate (TESSALON) 200 MG capsule, benzonatate 200 mg capsule  take 1 capsule by mouth three times a day, Disp: , Rfl:     benzonatate (TESSALON) 200 MG capsule, Take 200 mg by mouth 3 (three) times a day (Patient not taking: Reported on 5/4/2022 ), Disp: , Rfl:     dexamethasone (DECADRON) 4 mg tablet, dexamethasone 4 mg tablet  TAKE 1 TABLET BY MOUTH TWICE A DAY (Patient not taking: Reported on 5/4/2022), Disp: , Rfl:     dexamethasone (DECADRON) 6 mg tablet, dexamethasone 6 mg tablet  TAKE 1 TABLET BY MOUTH TWICE A DAY (Patient not taking: Reported on 5/4/2022), Disp: , Rfl:     fenofibrate (TRICOR) 54 MG tablet, Take 75 mg by mouth daily  (Patient not taking: Reported on 5/4/2022 ), Disp: , Rfl:     gabapentin (Neurontin) 100 mg capsule, Take 1 capsule (100 mg total) by mouth daily at bedtime, Disp: 30 capsule, Rfl: 0    levothyroxine 50 mcg tablet, Take 50 mcg by mouth daily, Disp: , Rfl:     levothyroxine 75 mcg tablet, , Disp: , Rfl:     methocarbamol (ROBAXIN) 750 mg tablet, methocarbamol 750 mg tablet  take 1 tablet by mouth nightly if needed for muscle spasm (Patient not taking: Reported on 5/4/2022), Disp: , Rfl:     nitroglycerin (NITROSTAT) 0 4 mg SL tablet, nitroglycerin 0 4 mg sublingual tablet  PLACE 1 TABLET (0 4 MG) BY SUBLINGUAL ROUTE EVERY 5 MINUTES AS NEEDEDFOR CHEST PAIN  DO NOT EXCEED 3 DOSES IN 15 MINUTES  (Patient not taking: Reported on 5/4/2022), Disp: , Rfl:     pramipexole (MIRAPEX) 0 25 mg tablet, Take 1 tablet (0 25 mg total) by mouth daily at bedtime (Patient not taking: Reported on 5/4/2022 ), Disp: 30 tablet, Rfl: 0    predniSONE 5 mg tablet, Take by mouth daily (Patient not taking: Reported on 11/29/2021 ), Disp: , Rfl:   Allergies   Allergen Reactions    Sulfa Antibiotics     Clarithromycin Rash    Penicillins Hives and Rash       Vitals: Blood pressure 125/69, pulse 79, height 5' 11" (1 803 m), weight 106 kg (234 lb)  Body mass index is 32 64 kg/m²          Physical Exam  General:  Awake alert and oriented x 3, conversant without conversational dyspnea, NAD, normal affect  HEENT:   Sclera noninjected, nonicteric OU, Nares patent,  no craniofacial abnormalities, Mucous membranes, moist, no oral lesions, normal dentition  NECK:  Trachea midline, no accessory muscle use, no stridor,  JVP not elevated  CARDIAC: Reg, single s1/S2, no m/r/g  PULM: CTA bilaterally no wheezing, rhonchi or rales  ABD: Soft nontender, nondistended, no rebound, no rigidity, no guarding  EXT: No cyanosis, no clubbing, no edema, normal capillary refill  NEURO: no focal neurologic deficits, AAOx3, moving all extremities appropriately    Labs: I have personally reviewed pertinent lab results  , ABG: No results found for: PHART, MJA2JHP, PO2ART, QLD1GAZ, K0PIYVMM, BEART, SOURCE, BNP: No results found for: BNP, CBC: No results found for: WBC, HGB, HCT, MCV, PLT, ADJUSTEDWBC, MCH, MCHC, RDW, MPV, NRBC, CMP: No results found for: SODIUM, K, CL, CO2, ANIONGAP, BUN, CREATININE, GLUCOSE, CALCIUM, AST, ALT, ALKPHOS, PROT, BILITOT, EGFR, PT/INR: No results found for: PT, INR, Troponin: No results found for: TROPONINI  Lab Results   Component Value Date    WBC 4 63 02/14/2022    HGB 15 5 02/14/2022    HCT 47 1 02/14/2022    MCV 90 02/14/2022     02/14/2022     Lab Results   Component Value Date    CALCIUM 8 9 02/14/2022    K 4 2 02/14/2022    CO2 25 02/14/2022     (H) 02/14/2022    BUN 11 02/14/2022    CREATININE 1 04 02/14/2022     No results found for: IGE  Lab Results   Component Value Date    ALT 26 02/14/2022    AST 20 02/14/2022    ALKPHOS 98 02/14/2022         Sleep studies: I have personally reviewed pertinent reports  PSG with inspire 1/2022  The patient slept for a total 393 minutes representing sleep efficiency of 90%  He had significant reduction of sleep disordered breathing events down from an average AHI of 30 events per hour on the control night study to 1 5 events per hour with titrating the inspire up to 3 4 volts  Thus continuing to use inspire with patient control range of 3 2 to 4 2 volts with an amplitude of 3 4 volts would be recommended  In addition, the patient has increased number of periodic limb movements with a PLM index of 96 9 events per hour    Excessive daytime sleepiness persist after successful treatment of sleep disordered breathing; checking a CBC, BUN, creatinine, TSH, vitamin B12, vitamin-D, magnesium, iron studies including ferritin to rule out underlying metabolic disorders associated with periodic limb movement disorder would be recommended, clinical correlation suggested  Kathy Edwards MD  [facility]       Portions of the record may have been created with voice recognition software  Occasional wrong word or "sound a like" substitutions may have occurred due to the inherent limitations of voice recognition software  Read the chart carefully and recognize, using context, where substitutions have occurred

## 2022-05-04 NOTE — ASSESSMENT & PLAN NOTE
· Severe obstructive sleep apnea failed CPAP therapy in the past underwent inspire implantation with successful lowering his sleep disordered breathing events down from 30 on his baseline study to 2 events per hour with the we using the inspire  · He has been having some issues with the tongue movement feels that the voltage too high and he is here to adjust the inspire, I lowered his inspire voltage range from 2 4-3 4 and amplitude at 2 8  · Kept his start delay and pause time the same  · I would repeat a home sleep study in 2 months while using the inspire to assure that this voltage is sufficient to help lower his sleep disordered breathing events

## 2022-05-24 ENCOUNTER — OFFICE VISIT (OUTPATIENT)
Dept: UROLOGY | Facility: CLINIC | Age: 67
End: 2022-05-24
Payer: MEDICARE

## 2022-05-24 VITALS — HEIGHT: 71 IN | WEIGHT: 226 LBS | BODY MASS INDEX: 31.64 KG/M2

## 2022-05-24 DIAGNOSIS — R35.1 NOCTURIA: ICD-10-CM

## 2022-05-24 DIAGNOSIS — Z12.5 SCREENING FOR PROSTATE CANCER: ICD-10-CM

## 2022-05-24 DIAGNOSIS — N52.01 ERECTILE DYSFUNCTION DUE TO ARTERIAL INSUFFICIENCY: Primary | ICD-10-CM

## 2022-05-24 PROCEDURE — 99204 OFFICE O/P NEW MOD 45 MIN: CPT | Performed by: UROLOGY

## 2022-05-24 RX ORDER — TADALAFIL 20 MG/1
20 TABLET ORAL DAILY PRN
Qty: 30 TABLET | Refills: 1 | Status: SHIPPED | OUTPATIENT
Start: 2022-05-24

## 2022-05-24 RX ORDER — TAMSULOSIN HYDROCHLORIDE 0.4 MG/1
0.4 CAPSULE ORAL
Qty: 90 CAPSULE | Refills: 3 | Status: SHIPPED | OUTPATIENT
Start: 2022-05-24

## 2022-05-24 NOTE — PROGRESS NOTES
5/24/2022    Irving Walter  1955  [id number]        Assessment  Erectile dysfunction, routine prostate cancer screening, BPH with nocturia      Discussion  With regards to his erectile dysfunction I prescribed generic Cialis 20 mg to take 1 hour prior to intercourse  Patient has a prior prescription for nitroglycerin from over 12 years ago but has never taken this medication  He understands that nitroglycerin and PDE5 inhibitors are contraindicated together  For his BPH and nocturia I prescribed tamsulosin 0 4 mg daily at bedtime  Follow-up will be in 3 months with a postvoid residual assessment and uroflow evaluation  I will also repeat a PSA level at that time  If he continues to have erectile dysfunction despite the Cialis next steps include intracavernosal Tri Mix injection with teaching  If he has persistent lower urinary tract symptoms despite tamsulosin the next step would be cystoscopy to assess his bladder outlet  History of Present Illness  78 y o  male with a history of COVID in December of 2020  He states that since that time he has had notable erectile dysfunction  Prior to this he states he had no issues with his erections  We also discussed that typically men of his age often begin to develop erectile dysfunction and that this may simply be coincidence  He complains of nocturia  He has moderate bother from this  With regards to his erectile dysfunction he has tried 100 mg of generic Viagra without success  A recent PSA level is 2 2 from July of 2021  He denies any family history of prostate cancer  At today's office visit I was approximately 25 minutes behind it did not give the patient notice  When I entered the room the patient was aggressive and agitated  I gave him the option to reschedule or to be seen today  The patient eventually was able to calm down and we were able to have a successful office visit            AUA Symptom Score  AUA SYMPTOM SCORE    Flowsheet Row Most Recent Value   AUA SYMPTOM SCORE    How often have you had a sensation of not emptying your bladder completely after you finished urinating? 3 (P)     How often have you had to urinate again less than two hours after you finished urinating? 3 (P)     How often have you found you stopped and started again several times when you urinate? 4 (P)     How often have you found it difficult to postpone urination? 5 (P)     How often have you had a weak urinary stream? 5 (P)     How often have you had to push or strain to begin urination? 3 (P)     How many times did you most typically get up to urinate from the time you went to bed at night until the time you got up in the morning? 5 (P)     Quality of Life: If you were to spend the rest of your life with your urinary condition just the way it is now, how would you feel about that? 4 (P)     AUA SYMPTOM SCORE 28 (P)           Review of Systems  Review of Systems   Constitutional: Negative  HENT: Negative  Eyes: Negative  Respiratory: Negative  Cardiovascular: Negative  Gastrointestinal: Negative  Endocrine: Negative  Genitourinary:        Per HPI   Musculoskeletal: Negative  Skin: Negative  Allergic/Immunologic: Negative  Neurological: Negative  Hematological: Negative  Psychiatric/Behavioral: Negative  Past Medical History  Past Medical History:   Diagnosis Date    Allergic rhinitis     Arthritis     Asthma     COVID-19 12/2020    [facility] ([facility] [address] [facility]     Hypertensive disorder 7/15/2013    Myocardial infarction [facility])     2012 / 2018    Sleep apnea     does not use C-PAP       Past Social History  Past Surgical History:   Procedure Laterality Date    CORONARY ARTERY BYPASS GRAFT  2012    2 stents   2018 stent / 2012 3 vessel bypass    EXAMINATION UNDER ANESTHESIA N/A 9/24/2020    Procedure: DRUG INDUCED SLEEP ENDOSCOPY;  Surgeon: Jennifer Garcia MD;  [facility];  Service: ENT    JOINT REPLACEMENT      MS INCISION IMPLANT CRANIAL NERVE STIM ELECTRODE/PULSE GEN N/A 2021    Procedure: INSERTION UPPER AIRWAY STIMULATOR, INSPIRE IMPLANT;  Surgeon: Shania Núñez MD;  [facility];  Service: ENT    REPLACEMENT TOTAL KNEE BILATERAL      ROTATOR CUFF REPAIR         Past Family History  Family History   Problem Relation Age of Onset    Cancer Brother     Melanoma Brother        Past Social history  Social History     Socioeconomic History    Marital status: /Civil Union     Spouse name: Not on file    Number of children: Not on file    Years of education: Not on file    Highest education level: Not on file   Occupational History    Not on file   Tobacco Use    Smoking status: Former Smoker     Packs/day: 0 50     Years: 6 00     Pack years: 3 00     Types: Cigarettes     Quit date:      Years since quittin 4    Smokeless tobacco: Former User   Vaping Use    Vaping Use: Never used   Substance and Sexual Activity    Alcohol use: Not Currently    Drug use: Never    Sexual activity: Not on file   Other Topics Concern    Not on file   Social History Narrative    Daily caffeine use - 1 cup coffee     Social Determinants of Health     Financial Resource Strain: Not on file   Food Insecurity: Not on file   Transportation Needs: Not on file   Physical Activity: Not on file   Stress: Not on file   Social Connections: Not on file   Intimate Partner Violence: Not on file   Housing Stability: Not on file       Current Medications  Current Outpatient Medications   Medication Sig Dispense Refill    tadalafil (CIALIS) 20 MG tablet Take 1 tablet (20 mg total) by mouth as needed in the morning for erectile dysfunction  30 tablet 1    tamsulosin (FLOMAX) 0 4 mg Take 1 capsule (0 4 mg total) by mouth daily with dinner 90 capsule 3    albuterol (2 5 mg/3 mL) 0 083 % nebulizer solution inhale contents of 1 vial ( 3 milliliters ) in nebulizer by mouth     (REFER TO PRESCRIPTION NOTES)        albuterol (PROVENTIL HFA) 90 mcg/act inhaler Inhale 1-2 puffs      aspirin 81 MG tablet Take 1 tablet by mouth daily      benzonatate (TESSALON PERLES) 100 mg capsule benzonatate 100 mg capsule (Patient not taking: Reported on 5/4/2022)      benzonatate (TESSALON) 200 MG capsule benzonatate 200 mg capsule   take 1 capsule by mouth three times a day      benzonatate (TESSALON) 200 MG capsule Take 200 mg by mouth 3 (three) times a day (Patient not taking: Reported on 5/4/2022 )      Cholecalciferol 50 MCG (2000 UT) CAPS Daily      clopidogrel (PLAVIX) 75 mg tablet clopidogrel 75 mg tablet      dexamethasone (DECADRON) 4 mg tablet dexamethasone 4 mg tablet   TAKE 1 TABLET BY MOUTH TWICE A DAY (Patient not taking: Reported on 5/4/2022)      dexamethasone (DECADRON) 6 mg tablet dexamethasone 6 mg tablet   TAKE 1 TABLET BY MOUTH TWICE A DAY (Patient not taking: Reported on 5/4/2022)      fenofibrate (TRICOR) 54 MG tablet Take 75 mg by mouth daily  (Patient not taking: Reported on 5/4/2022 )      fluticasone-salmeterol (ADVAIR DISKUS) 250-50 mcg/dose inhaler Inhale 1 puff 2 (two) times a day      gabapentin (Neurontin) 100 mg capsule Take 1 capsule (100 mg total) by mouth daily at bedtime 30 capsule 0    Influenza Vac A&B SA Adj Quad (Fluad Quadrivalent) 0 5 ML PRSY Fluad Quad [id number]([id number] up)(PF) 60 mcg (15 mcg x 4)/0 5mL IM syringe   inject 0 5 milliliters intramuscularly      levothyroxine 50 mcg tablet Take 50 mcg by mouth daily      levothyroxine 75 mcg tablet       levothyroxine 75 mcg tablet  (Patient not taking: Reported on 5/4/2022 )      methocarbamol (ROBAXIN) 750 mg tablet methocarbamol 750 mg tablet   take 1 tablet by mouth nightly if needed for muscle spasm (Patient not taking: Reported on 5/4/2022)      metoprolol succinate (TOPROL XL) 25 mg 24 hr tablet Toprol XL 25 mg tablet,extended release      mometasone (ELOCON) 0 1 % lotion Apply topically as needed (2 drops each ear canal once daily as needed) 2 drops to each ear canal as needed 60 mL 3    montelukast (SINGULAIR) 10 mg tablet montelukast 10 mg tablet      Multiple Vitamins-Minerals (VITAMIN D3 COMPLETE PO) Daily      nitroglycerin (NITROSTAT) 0 4 mg SL tablet nitroglycerin 0 4 mg sublingual tablet   PLACE 1 TABLET (0 4 MG) BY SUBLINGUAL ROUTE EVERY 5 MINUTES AS NEEDEDFOR CHEST PAIN  DO NOT EXCEED 3 DOSES IN 15 MINUTES  (Patient not taking: Reported on 5/4/2022)      OMEGA-3 FATTY ACIDS PO 360mg      omeprazole (PriLOSEC) 20 mg delayed release capsule Take 20 mg by mouth daily      pramipexole (MIRAPEX) 0 25 mg tablet Take 1 tablet (0 25 mg total) by mouth daily at bedtime (Patient not taking: Reported on 5/4/2022 ) 30 tablet 0    predniSONE 5 mg tablet Take by mouth daily (Patient not taking: Reported on 11/29/2021 )      Repatha SureClick 311 MG/ML SOAJ       sildenafil (VIAGRA) 50 MG tablet sildenafil 50 mg tablet   take 1 tablet by mouth once daily      SODIUM FLUORIDE, DENTAL GEL, (PreviDent) 1 1 % GEL PreviDent 5000 Booster Plus 1 1 % dental paste   BRUSH 1 TIME DAILY PREFERABLY BEFORE BED, DO NOT RINSE AFTER USE      tiotropium (SPIRIVA) 18 mcg inhalation capsule Place 18 mcg into inhaler and inhale daily      Zoster Vac Recomb Adjuvanted (Shingrix) 50 MCG/0 5ML SUSR Shingrix (PF) 50 mcg/0 5 mL intramuscular suspension, kit       No current facility-administered medications for this visit  Allergies  Allergies   Allergen Reactions    Sulfa Antibiotics     Clarithromycin Rash    Penicillins Hives and Rash       Past Medical History, Social History, Family History, medications and allergies were reviewed  Vitals  Vitals:    05/24/22 1039   Weight: 103 kg (226 lb)   Height: 5' 11" (1 803 m)       Physical Exam  Physical Exam  On examination he is in no acute distress  His abdomen is soft nontender nondistended   examination reveals normal phallus, scrotum and scrotal contents  A rash from shaving is noted  Digital rectal examination reveals a 60 g prostate which is smooth and firm but without nodularity  Skin is warm  Extremities without edema    Neurologic is grossly intact and nonfocal   Gait normal   Affect normal    Results  Lab Results   Component Value Date    PSA 2 2 07/27/2021    PSA 1 5 01/23/2020     Lab Results   Component Value Date    CALCIUM 8 9 02/14/2022    K 4 2 02/14/2022    CO2 25 02/14/2022     (H) 02/14/2022    BUN 11 02/14/2022    CREATININE 1 04 02/14/2022     Lab Results   Component Value Date    WBC 4 63 02/14/2022    HGB 15 5 02/14/2022    HCT 47 1 02/14/2022    MCV 90 02/14/2022     02/14/2022         Office Urine Dip  No results found for this or any previous visit (from the past 1 hour(s)) ]

## 2022-08-16 ENCOUNTER — APPOINTMENT (OUTPATIENT)
Dept: LAB | Facility: CLINIC | Age: 67
End: 2022-08-16
Payer: MEDICARE

## 2022-08-16 DIAGNOSIS — Z12.5 SCREENING FOR PROSTATE CANCER: ICD-10-CM

## 2022-08-16 LAB — PSA SERPL-MCNC: 2.5 NG/ML (ref 0–4)

## 2022-08-16 PROCEDURE — G0103 PSA SCREENING: HCPCS

## 2022-08-16 PROCEDURE — 36415 COLL VENOUS BLD VENIPUNCTURE: CPT

## 2022-08-18 ENCOUNTER — OFFICE VISIT (OUTPATIENT)
Dept: UROLOGY | Facility: CLINIC | Age: 67
End: 2022-08-18
Payer: MEDICARE

## 2022-08-18 VITALS
WEIGHT: 229 LBS | BODY MASS INDEX: 32.06 KG/M2 | SYSTOLIC BLOOD PRESSURE: 118 MMHG | HEIGHT: 71 IN | HEART RATE: 80 BPM | DIASTOLIC BLOOD PRESSURE: 80 MMHG

## 2022-08-18 DIAGNOSIS — R35.1 BENIGN PROSTATIC HYPERPLASIA WITH NOCTURIA: ICD-10-CM

## 2022-08-18 DIAGNOSIS — N40.1 BENIGN PROSTATIC HYPERPLASIA WITH NOCTURIA: ICD-10-CM

## 2022-08-18 DIAGNOSIS — R35.1 NOCTURIA: ICD-10-CM

## 2022-08-18 DIAGNOSIS — N52.9 ERECTILE DYSFUNCTION, UNSPECIFIED ERECTILE DYSFUNCTION TYPE: Primary | ICD-10-CM

## 2022-08-18 PROCEDURE — 99214 OFFICE O/P EST MOD 30 MIN: CPT | Performed by: PHYSICIAN ASSISTANT

## 2022-08-18 RX ORDER — TAMSULOSIN HYDROCHLORIDE 0.4 MG/1
0.4 CAPSULE ORAL
Qty: 90 CAPSULE | Refills: 3 | Status: SHIPPED | OUTPATIENT
Start: 2022-08-18

## 2022-08-18 RX ORDER — TIOTROPIUM BROMIDE INHALATION SPRAY 1.56 UG/1
SPRAY, METERED RESPIRATORY (INHALATION)
COMMUNITY

## 2022-08-18 RX ORDER — FLUTICASONE PROPIONATE AND SALMETEROL 500; 50 UG/1; UG/1
POWDER RESPIRATORY (INHALATION)
COMMUNITY

## 2022-08-18 RX ORDER — TADALAFIL 5 MG/1
5 TABLET ORAL DAILY
Qty: 90 TABLET | Refills: 1 | Status: SHIPPED | OUTPATIENT
Start: 2022-08-18

## 2022-08-18 RX ORDER — PRAVASTATIN SODIUM 40 MG
TABLET ORAL
COMMUNITY

## 2022-08-23 ENCOUNTER — HOSPITAL ENCOUNTER (OUTPATIENT)
Dept: SLEEP CENTER | Facility: CLINIC | Age: 67
Discharge: HOME | End: 2022-08-23
Payer: MEDICARE

## 2022-08-23 DIAGNOSIS — G47.33 OBSTRUCTIVE SLEEP APNEA SYNDROME: ICD-10-CM

## 2022-08-23 PROCEDURE — G0399 HOME SLEEP TEST/TYPE 3 PORTA: HCPCS

## 2022-08-24 NOTE — PROGRESS NOTES
Home Sleep Study Documentation    HOME STUDY DEVICE: Noxturnal yes                                           Lu G3 no      Pre-Sleep Home Study:    Set-up and instructions performed by: DELMAR Majano     Technician performed demonstration for Patient: yes    Return demonstration performed by Patient: yes    Written instructions provided to Patient: yes    Patient signed consent form: yes        Post-Sleep Home Study:    Additional comments by Patient:         Home Sleep Study Failed:no:    Failure reason: N/A    Reported or Detected: N/A    Scored by: LELA Pelaez

## 2022-08-28 PROCEDURE — 95806 SLEEP STUDY UNATT&RESP EFFT: CPT | Performed by: INTERNAL MEDICINE

## 2022-08-29 ENCOUNTER — TELEPHONE (OUTPATIENT)
Dept: SLEEP CENTER | Facility: CLINIC | Age: 67
End: 2022-08-29

## 2022-08-29 NOTE — TELEPHONE ENCOUNTER
Left message for the patient that his sleep study is resulted and he has an Inspire follow up with Dr Cyr Friendly scheduled     Appointment details left in message

## 2022-10-03 ENCOUNTER — OFFICE VISIT (OUTPATIENT)
Dept: SLEEP CENTER | Facility: CLINIC | Age: 67
End: 2022-10-03
Payer: MEDICARE

## 2022-10-03 VITALS
SYSTOLIC BLOOD PRESSURE: 107 MMHG | HEART RATE: 94 BPM | DIASTOLIC BLOOD PRESSURE: 75 MMHG | BODY MASS INDEX: 32.2 KG/M2 | HEIGHT: 71 IN | WEIGHT: 230 LBS

## 2022-10-03 DIAGNOSIS — R06.83 SNORING: ICD-10-CM

## 2022-10-03 DIAGNOSIS — G47.33 OBSTRUCTIVE SLEEP APNEA: Primary | ICD-10-CM

## 2022-10-03 DIAGNOSIS — G47.19 EXCESSIVE DAYTIME SLEEPINESS: ICD-10-CM

## 2022-10-03 DIAGNOSIS — G47.61 PERIODIC LIMB MOVEMENT DISORDER: ICD-10-CM

## 2022-10-03 PROCEDURE — 99214 OFFICE O/P EST MOD 30 MIN: CPT | Performed by: INTERNAL MEDICINE

## 2022-10-03 PROCEDURE — 95977 ALYS CPLX CN NPGT PRGRMG: CPT | Performed by: INTERNAL MEDICINE

## 2022-10-03 NOTE — PATIENT INSTRUCTIONS
Sleep Apnea   AMBULATORY CARE:   Sleep apnea  is a condition that causes you to stop breathing often during sleep  Types of sleep apnea:   Obstructive sleep apnea (MILDRED)  is the most common kind  The muscles and tissues around your throat relax and block air from passing through  Obesity, use of alcohol or cigarettes, or a family history are common causes  MILDRED may increase your risk for complications after surgery  Central sleep apnea (CSA)  means your brain does not send signals to the muscles that control breathing  You do not take a breath even though your airway is open  Common causes include medical conditions such as heart failure, being older than 40, or use of opioids  Complex (or mixed) sleep apnea  means you have both obstructive and central sleep apnea  Common signs and symptoms:   Loud snoring or long pauses in breathing    Feeling sleepy, slow, and tired during the day    Snorting, gasping, or choking while you sleep, and waking up suddenly because of these    Feeling irritable during the day    Dry mouth or a headache in the mornings    Heavy night sweating    A hard time thinking, remembering things, or focusing on your tasks the following day    Call your local emergency number (911 in the [address] [facility]) if:   You have chest pain or trouble breathing  Call your doctor if:   You have new or worsening signs or symptoms  You have questions or concerns about your condition or care  Treatment  depends on the kind of apnea you have  A mouth device  may be needed if you have mild sleep apnea  These are designed to keep your throat open  Ask your dentist or healthcare provider about the best mouth device for you  A machine  may be used to help you get more air during sleep  A mask may be placed over your nose and mouth, or just your nose  The mask is hooked to the machine  You will get air through the mask      A continuous positive airway pressure (CPAP) machine  is used to keep your airway open during sleep  The machine blows a gentle stream of air into the mask when you breathe  This helps keep your airway open so you can breathe more regularly  Extra oxygen may be given through the machine  A bilevel positive airway pressure (BiPAP) machine  gives air but lowers the pressure when you breathe out  An adaptive servo-ventilator (ASV)  is a machine that learns your usual breathing pattern  Then, it uses pressure to give you air and prevent stops in your breathing  Surgery  to expand your airway or remove extra tissues may be needed  Surgery is usually only considered if other treatments do not work  Manage or prevent sleep apnea:   Reach and maintain a healthy weight  Ask your healthcare provider what a healthy weight is for you  Ask him or her to help you create a safe weight loss plan if you are overweight  Even a small goal of a 10% weight loss can improve your symptoms  Do not smoke  Nicotine and other chemicals in cigarettes and cigars can cause lung damage  Ask your healthcare provider for information if you currently smoke and need help to quit  E-cigarettes or smokeless tobacco still contain nicotine  Talk to your healthcare provider before you use these products  Do not drink alcohol or take sedative medicine before you go to sleep  Alcohol and sedatives can relax the muscles and tissues around your throat  This can block the airflow to your lungs  Sleep on your side or use pillows designed to prevent sleep apnea  This prevents your tongue or other tissues from blocking your throat  You can also raise the head of your bed  Follow up with your doctor or specialist as directed: You may need to have blood tests during your follow-up visits  Work with your provider to find the right breathing support equipment and settings for you  Write down your questions so you remember to ask them during your visits    © Copyright [employer] 2022 Information is for End User's use only and may not be sold, redistributed or otherwise used for commercial purposes  All illustrations and images included in CareNotes® are the copyrighted property of A [employer] A M [employer]  or Zita Pearce  The above information is an  only  It is not intended as medical advice for individual conditions or treatments  Talk to your doctor, nurse or pharmacist before following any medical regimen to see if it is safe and effective for you

## 2022-10-03 NOTE — ASSESSMENT & PLAN NOTE
· Moderate obstructive sleep apnea diagnosed in 2021 with a baseline AHI of 23 7 events per hour  · His inspire was implanted in September 2021 he was on 2 7-3 7 configuration A +-+  · Have fine tuning study in January seem to have significant improvement of his sleep apnea at 3 4 v  · Unfortunately due to intolerance issues he was down only at 2 7 volts and he had a repeat home study recently on that that showed to have high residual AHI  · I switched his own configuration completely today to configuration B -0-, at 1 3 volts he had a significant improvement of his tongue protrusion and he felt way more comfortable in terms of his right-sided neck pain and back pain  · I also increase his sleep duration delayed time to half an hour based on his request  · I would like to repeat another home study in 6 weeks  · I instructed him to keep going up as much as he tolerates on his inspire voltage 1 level per week until he gets the home study  · And he would follow up 2 weeks after that

## 2022-10-03 NOTE — PROGRESS NOTES
Review of Systems      Genitourinary need to urinate more than twice a night and difficulty with erection   Cardiology none   Gastrointestinal none   Neurology none   Constitutional none   Integumentary none   Psychiatry none   Musculoskeletal legs twitching/jerking   Pulmonary none   ENT none   Endocrine none   Hematological none

## 2022-10-03 NOTE — PROGRESS NOTES
Pulmonary/Sleep Follow Up Note   Marco A Campos 75 y o  male MRN: [id number]  10/3/2022      Assessment and Plan:    1  Obstructive sleep apnea  Assessment & Plan:  · Moderate obstructive sleep apnea diagnosed in 2021 with a baseline AHI of 23 7 events per hour  · His inspire was implanted in September 2021 he was on 2 7-3 7 configuration A +-+  · Have fine tuning study in January seem to have significant improvement of his sleep apnea at 3 4 v  · Unfortunately due to intolerance issues he was down only at 2 7 volts and he had a repeat home study recently on that that showed to have high residual AHI  · I switched his own configuration completely today to configuration B -0-, at 1 3 volts he had a significant improvement of his tongue protrusion and he felt way more comfortable in terms of his right-sided neck pain and back pain  · I also increase his sleep duration delayed time to half an hour based on his request  · I would like to repeat another home study in 6 weeks  · I instructed him to keep going up as much as he tolerates on his inspire voltage 1 level per week until he gets the home study  · And he would follow up 2 weeks after that    Orders:  -     Home Study; Future; Expected date: 11/17/2022    2  Excessive daytime sleepiness  -     Home Study; Future; Expected date: 11/17/2022    3  Snoring  -     Home Study; Future; Expected date: 11/17/2022    4  Periodic limb movement disorder  Assessment & Plan:  If his excessive daytime sleepiness remains after sufficient and minimal residual AHI I would consider treating him        Return in about 2 months (around 12/3/2022)      History of Present Illness   HPI:  Marco A Campos is a 75 y o  male who is here for follow-up appointment the patient has history of severe MILDRED with an AHI of 30 events per hour underwent inspire/hypoglossal nerve stimulation activation he underwent a fine tuning study 3 months afterwards with significant improvement of his sleep disordered breathing events down to a 2 events per hour  The patient started to experience intolerance issues he mainly describes it out right-sided neck pain and sometimes associated with back pain neck pain for that he started to go down his inspire voltage to 2 6-2 7 and he remained that and even with that he was not compliant using it or turning it on every night he underwent a home sleep study recently that seem to have high residual AHI as detailed below  Review of Systems        Genitourinary need to urinate more than twice a night and difficulty with erection   Cardiology none   Gastrointestinal none   Neurology none   Constitutional none   Integumentary none   Psychiatry none   Musculoskeletal legs twitching/jerking   Pulmonary none   ENT none   Endocrine none   Hematological none           Historical Information   Past Medical History:   Diagnosis Date    Allergic rhinitis     Arthritis     Asthma     COVID-19 12/2020    [facility] [address] [facility]     Hypertensive disorder 7/15/2013    Myocardial infarction ([facility] [address] )     2012 / 2018    Sleep apnea     does not use C-PAP     Past Surgical History:   Procedure Laterality Date    CORONARY ARTERY BYPASS GRAFT  2012 2 stents 2018 stent / 2012 3 vessel bypass    EXAMINATION UNDER ANESTHESIA N/A 9/24/2020    Procedure: DRUG INDUCED SLEEP ENDOSCOPY;  Surgeon: Adonay Emanuel MD;  [facility];  Service: ENT    JOINT REPLACEMENT      CO INCISION IMPLANT CRANIAL NERVE STIM ELECTRODE/PULSE GEN N/A 9/8/2021    Procedure: INSERTION UPPER AIRWAY STIMULATOR, INSPIRE IMPLANT;  Surgeon: Adonay Emanuel MD;  Location: [facility];  Service: ENT    REPLACEMENT TOTAL KNEE BILATERAL      ROTATOR CUFF REPAIR       Family History   Problem Relation Age of Onset    Cancer Brother     Melanoma Brother          Meds/Allergies     Current Outpatient Medications:     albuterol (2 5 mg/3 mL) 0 083 % nebulizer solution, inhale contents of 1 vial ( 3 milliliters ) in nebulizer by mouth     (REFER TO PRESCRIPTION NOTES)  , Disp: , Rfl:     albuterol (PROVENTIL HFA,VENTOLIN HFA) 90 mcg/act inhaler, Inhale 1-2 puffs, Disp: , Rfl:     aspirin 81 MG tablet, Take 1 tablet by mouth daily, Disp: , Rfl:     benzonatate (TESSALON) 200 MG capsule, benzonatate 200 mg capsule  take 1 capsule by mouth three times a day, Disp: , Rfl:     benzonatate (TESSALON) 200 MG capsule, Take 200 mg by mouth 3 (three) times a day, Disp: , Rfl:     Cholecalciferol 50 MCG (2000 UT) CAPS, Daily, Disp: , Rfl:     clopidogrel (PLAVIX) 75 mg tablet, clopidogrel 75 mg tablet, Disp: , Rfl:     Fluticasone-Salmeterol (Advair) 500-50 mcg/dose inhaler, Advair Diskus 500 mcg-50 mcg/dose powder for inhalation, Disp: , Rfl:     levothyroxine 75 mcg tablet, , Disp: , Rfl:     levothyroxine 75 mcg tablet, , Disp: , Rfl:     metoprolol succinate (TOPROL-XL) 25 mg 24 hr tablet, Toprol XL 25 mg tablet,extended release, Disp: , Rfl:     mometasone (ELOCON) 0 1 % lotion, Apply topically as needed (2 drops each ear canal once daily as needed) 2 drops to each ear canal as needed, Disp: 60 mL, Rfl: 3    montelukast (SINGULAIR) 10 mg tablet, montelukast 10 mg tablet, Disp: , Rfl:     Multiple Vitamins-Minerals (VITAMIN D3 COMPLETE PO), Daily, Disp: , Rfl:     OMEGA-3 FATTY ACIDS PO, 360mg, Disp: , Rfl:     omeprazole (PriLOSEC) 20 mg delayed release capsule, Take 20 mg by mouth daily, Disp: , Rfl:     Repatha SureClick 698 MG/ML SOAJ, , Disp: , Rfl:     SODIUM FLUORIDE, DENTAL GEL, 1 1 % GEL, PreviDent 5000 Booster Plus 1 1 % dental paste  BRUSH 1 TIME DAILY PREFERABLY BEFORE BED, DO NOT RINSE AFTER USE, Disp: , Rfl:     tadalafil (CIALIS) 5 MG tablet, Take 1 tablet (5 mg total) by mouth in the morning, Disp: 90 tablet, Rfl: 1    tamsulosin (FLOMAX) 0 4 mg, Take 1 capsule (0 4 mg total) by mouth daily with dinner, Disp: 90 capsule, Rfl: 3    tiotropium (Spiriva Respimat) 1 25 MCG/ACT AERS inhaler, Spiriva Respimat 1 25 mcg/actuation solution for inhalation  2 puffs daily, Disp: , Rfl:     tiotropium (SPIRIVA) 18 mcg inhalation capsule, Place 18 mcg into inhaler and inhale daily, Disp: , Rfl:     dexamethasone (DECADRON) 4 mg tablet, dexamethasone 4 mg tablet  TAKE 1 TABLET BY MOUTH TWICE A DAY (Patient not taking: No sig reported), Disp: , Rfl:     dexamethasone (DECADRON) 6 mg tablet, dexamethasone 6 mg tablet  TAKE 1 TABLET BY MOUTH TWICE A DAY (Patient not taking: No sig reported), Disp: , Rfl:     fenofibrate (TRICOR) 54 MG tablet, Take 75 mg by mouth daily  (Patient not taking: No sig reported), Disp: , Rfl:     fluticasone-salmeterol (Advair) 250-50 mcg/dose inhaler, Inhale 1 puff 2 (two) times a day (Patient not taking: No sig reported), Disp: , Rfl:     Influenza Vac A&B SA Adj Quad (Fluad Quadrivalent) 0 5 ML [facility] Fluad Quad [id number](56MB up)(PF) 60 mcg (15 mcg x 4)/0 5mL IM syringe  inject 0 5 milliliters intramuscularly (Patient not taking: No sig reported), Disp: , Rfl:     methocarbamol (ROBAXIN) 750 mg tablet, methocarbamol 750 mg tablet  take 1 tablet by mouth nightly if needed for muscle spasm (Patient not taking: No sig reported), Disp: , Rfl:     nitroglycerin (NITROSTAT) 0 4 mg SL tablet, nitroglycerin 0 4 mg sublingual tablet  PLACE 1 TABLET (0 4 MG) BY SUBLINGUAL ROUTE EVERY 5 MINUTES AS NEEDEDFOR CHEST PAIN  DO NOT EXCEED 3 DOSES IN 15 MINUTES   (Patient not taking: No sig reported), Disp: , Rfl:     pramipexole (MIRAPEX) 0 25 mg tablet, Take 1 tablet (0 25 mg total) by mouth daily at bedtime (Patient not taking: No sig reported), Disp: 30 tablet, Rfl: 0    pravastatin (PRAVACHOL) 40 mg tablet, pravastatin 40 mg tablet (Patient not taking: No sig reported), Disp: , Rfl:     Zoster Vac Recomb Adjuvanted (Shingrix) 50 MCG/0 5ML SUSR, Shingrix (PF) 50 mcg/0 5 mL intramuscular suspension, kit (Patient not taking: No sig reported), Disp: , Rfl:   Allergies   Allergen Reactions    Sulfa Antibiotics     Clarithromycin Rash    Penicillins Hives and Rash       Vitals: Blood pressure 107/75, pulse 94, height 5' 11" (1 803 m), weight 104 kg (230 lb)  Body mass index is 32 08 kg/m²  Physical Exam  General:  Awake alert and oriented x 3, conversant without conversational dyspnea, NAD, normal affect  HEENT:   Sclera noninjected, nonicteric OU, Nares patent,  no craniofacial abnormalities, Mucous membranes, moist, no oral lesions, normal dentition  NECK:  Trachea midline, no accessory muscle use, no stridor,  JVP not elevated  CARDIAC: Reg, single s1/S2, no m/r/g  PULM: CTA bilaterally no wheezing, rhonchi or rales  ABD: Soft nontender, nondistended, no rebound, no rigidity, no guarding  EXT: No cyanosis, no clubbing, no edema, normal capillary refill  NEURO: no focal neurologic deficits, AAOx3, moving all extremities appropriately    Labs: I have personally reviewed pertinent lab results  , ABG: No results found for: PHART, FYY0LDW, PO2ART, NVX8NLN, R2TKJEAI, BEART, SOURCE, BNP: No results found for: BNP, CBC: No results found for: WBC, HGB, HCT, MCV, PLT, ADJUSTEDWBC, MCH, MCHC, RDW, MPV, NRBC, CMP: No results found for: SODIUM, K, CL, CO2, ANIONGAP, BUN, CREATININE, GLUCOSE, CALCIUM, AST, ALT, ALKPHOS, PROT, BILITOT, EGFR, PT/INR: No results found for: PT, INR, Troponin: No results found for: TROPONINI  Lab Results   Component Value Date    WBC 4 63 02/14/2022    HGB 15 5 02/14/2022    HCT 47 1 02/14/2022    MCV 90 02/14/2022     02/14/2022     Lab Results   Component Value Date    CALCIUM 8 9 02/14/2022    K 4 2 02/14/2022    CO2 25 02/14/2022     (H) 02/14/2022    BUN 11 02/14/2022    CREATININE 1 04 02/14/2022     No results found for: IGE  Lab Results   Component Value Date    ALT 26 02/14/2022    AST 20 02/14/2022    ALKPHOS 98 02/14/2022         Sleep studies: I have personally reviewed pertinent reports      PSG with inspire 1/2022  The patient slept for a total 393 minutes representing sleep efficiency of 90%  He had significant reduction of sleep disordered breathing events down from an average AHI of 30 events per hour on the control night study to 1 5 events per hour with titrating the inspire up to 3 4 volts  Thus continuing to use inspire with patient control range of 3 2 to 4 2 volts with an amplitude of 3 4 volts would be recommended  In addition, the patient has increased number of periodic limb movements with a PLM index of 96 9 events per hour  Excessive daytime sleepiness persist after successful treatment of sleep disordered breathing; checking a CBC, BUN, creatinine, TSH, vitamin B12, vitamin-D, magnesium, iron studies including ferritin to rule out underlying metabolic disorders associated with periodic limb movement disorder would be recommended, clinical correlation suggested  HST 8/2022  Mr Aracelis De La Paz demonstrated an increased number of sleep related respiratory events (FANNIE - 25 3) which is consistent with moderate obstructive sleep apnea  The vast majority of these events were obstructive apnea but there were a few central events noted as well (AUGIE - 3 2, mixed apnea 1 5)  I would encourage advancing Inspire therapy as tolerated  Nestor Ruggiero MD  [address] [facility]       Portions of the record may have been created with voice recognition software  Occasional wrong word or "sound a like" substitutions may have occurred due to the inherent limitations of voice recognition software  Read the chart carefully and recognize, using context, where substitutions have occurred

## 2022-10-03 NOTE — ASSESSMENT & PLAN NOTE
If his excessive daytime sleepiness remains after sufficient and minimal residual AHI I would consider treating him

## 2022-10-11 PROBLEM — U07.1 PNEUMONIA DUE TO COVID-19 VIRUS: Status: RESOLVED | Noted: 2020-12-05 | Resolved: 2022-10-11

## 2022-10-11 PROBLEM — R05.9 COUGH: Status: RESOLVED | Noted: 2018-11-02 | Resolved: 2022-10-11

## 2022-10-11 PROBLEM — J12.82 PNEUMONIA DUE TO COVID-19 VIRUS: Status: RESOLVED | Noted: 2020-12-05 | Resolved: 2022-10-11

## 2022-11-11 ENCOUNTER — TELEPHONE (OUTPATIENT)
Dept: SLEEP CENTER | Facility: CLINIC | Age: 67
End: 2022-11-11

## 2022-11-11 NOTE — TELEPHONE ENCOUNTER
Patient called stating he is having pain in his neck from his inspire set at #4  However, you would like it set at #6  Patient goes to bed around 10pm and then is awoken by 12-1am with pain  Then it takes a day or two to feel better before he can use the inspire again  Sleep study is scheduled on 11/21 at 1p  You have no open appointments to get the patient in sooner  Can we create a slot for him to get him in prior to his sleep study?

## 2022-11-11 NOTE — TELEPHONE ENCOUNTER
Patient interviewed and examined.       Chief Complaint   Patient presents with   • Wound Check     Pt was here on Wednesday for cyst on buttocks.  Cyst was packed with gauze and she states she is here for wound check        HPI: Patient is a 29 year old female with history listed below presenting for pilonidal abscess packing reevaluation.  Patient had a pilonidal abscess drained 2 days ago.  Was placed on Flagyl and Bactrim.  Since then she states is been minimal drainage, pain is improved markedly and there is no redness around the area.  No fevers or chills.  No difficulty having a bowel movement      PAST MEDICAL HX:    No known problems                                             PAST SURGICAL HX:    NO PAST SURGERIES                                             Family History   Problem Relation Age of Onset   • Cancer Maternal Grandmother    • Cancer Paternal Grandmother    • Cancer Paternal Grandfather      Review of patient's family status indicates:    Maternal Grandmother                                     Paternal Grandmother                                     Paternal Grandfather                                       Social History     Tobacco Use   • Smoking status: Never Smoker   • Smokeless tobacco: Never Used   Substance Use Topics   • Alcohol use: Yes   • Drug use: Never        Prior to Admission medications    Medication Sig Start Date End Date Taking? Authorizing Provider   cephalexin (KEFLEX) 500 MG capsule Take 1 capsule by mouth 3 times daily for 10 days. 8/12/20 8/22/20  Xiang Adams MD   metroNIDAZOLE (FLAGYL) 500 MG tablet Take 1 tablet by mouth 3 times daily for 10 days. 8/12/20 8/22/20  Xiang Adams MD   TRI-LO-SPRINTEC 0.18/0.215/0.25 MG-25 MCG tablet TAKE 1 TABLET BY MOUTH EVERY DAY 2/8/20   Jhonny Dooley DO   TRI-LO-SPRINTEC 0.18/0.215/0.25 MG-25 MCG tablet TAKE 1 TABLET BY MOUTH DAILY 11/18/19   Jhonny Dooley DO        Vitals:    08/14/20 1239   Temp: 98.2 °F (36.8 °C)  YESSSS please   Pulse: 78   Resp: 14   SpO2: 98%   BP: 138/58        No Known Allergies     The case was reviewed with the patient. Nursing notes reviewed. Vitals Reviewed.  Pertinent medical records readily available were reviewed    Review of Systems   Constitutional: Negative for chills and fever.   HENT: Negative for congestion and sinus pain.    Cardiovascular: Negative for chest pain and palpitations.   Gastrointestinal: Negative for abdominal pain, nausea and vomiting.   Musculoskeletal:        Pilonidal cyst          Physical Exam  Vitals signs and nursing note reviewed.   Constitutional:       Appearance: Normal appearance.   HENT:      Head: Normocephalic and atraumatic.      Mouth/Throat:      Mouth: Mucous membranes are moist.   Eyes:      Extraocular Movements: Extraocular movements intact.   Neck:      Musculoskeletal: Normal range of motion.   Pulmonary:      Effort: Pulmonary effort is normal. No respiratory distress.   Genitourinary:     Comments: Patient with small incision superior intergluteal cleft, packing in place, no surrounding erythema, no induration, no fluctuance small amount of discharge, packing in place  Musculoskeletal: Normal range of motion.         General: No swelling or tenderness.   Skin:     General: Skin is warm and dry.      Coloration: Skin is not jaundiced.   Neurological:      General: No focal deficit present.      Mental Status: She is alert and oriented to person, place, and time.          No results found for this visit on 08/14/20.     Procedure pilonidal cyst packing removal  Indication pilonidal cyst abscess    Wound was cleaned and prepped and I did remove iodoform gauze.  I did replace with half inch iodoform gauze.  Patient tolerated procedure well.  No complications.  Wound appears clean dry intact, minimal discharge.  We will have her continue on antibiotics and a repeat evaluation in 2 days.  Wound appears improved    Urgent Care DEPARTMENT MEDICAL DECISION MAKING: Prior to  obtaining the patient's history, performing the physical exam and reviewing the diagnostics, multiple initial diagnoses were considered based on the presenting problem.     DIAGNOSIS: After the evaluation in the [facility], my clinical impression is   ED Diagnosis        Final diagnosis    Abscess packing removal                 PLAN and FOLLOW-Up:  Pt or patient's guardian understands to go to ED if symptoms worsen.  In my judgment, in view of the above findings, the patient does not have a condition that requires surgical intervention or further testing in the emergency department at this time.     The following discussion took place: the patient and/or guardians was told that an appropriate evaluation has been performed, and in my medical judgment there is currently no evidence of an immediate life-threatening or surgical condition at this time. Discharge is therefore indicated at this time. The patient and/or guardian was advised that a small risk still exists that a serious condition could develop. The patient and/or guardian was instructed to arrange mandatory close follow-up with their physician (or with the referral physician given today) within 24 hours. If that doctor is not available, the patient and/or guardian was instructed to go to the Emergency Department. The patient and/or guardian was told to go to the Emergency Department immediately if the symptoms worsen, particularly if increased pain, vomiting, fevers, or any concerns at all.       Summary of your Discharge Medications          Accurate as of August 14, 2020  1:08 PM. Always use your most recent med list.            Take these Medications      Details   cephalexin 500 MG capsule  Commonly known as:  Keflex   Take 1 capsule by mouth 3 times daily for 10 days.     metroNIDAZOLE 500 MG tablet  Commonly known as:  FLAGYL   Take 1 tablet by mouth 3 times daily for 10 days.     * Tri-Lo-Sprintec 0.18/0.215/0.25 MG-25 MCG tablet    Generic drug:  Norgestimate-Ethinyl Estradiol  TAKE 1 TABLET BY MOUTH DAILY     * Tri-Lo-Sprintec 0.18/0.215/0.25 MG-25 MCG tablet   Generic drug:  Norgestimate-Ethinyl Estradiol  TAKE 1 TABLET BY MOUTH EVERY DAY         * This list has 2 medication(s) that are the same as other medications prescribed for you. Read the directions carefully, and ask your doctor or other care provider to review them with you.                 Patient and/or patient's guardian received written and verbal instructions regarding this condition.  Pt and/or guardian was provided instruction, education, and follow-up.  Follow up to be arranged within 2 days with pcp or [facility] for further evaluation.      [This note used Dragon technology. Transcription errors are not uncommon and may not have been corrected prior to electronically signing the note. Should you find these errors, please consult the clinician for interpretation (or apply common sense adjustment when safe and appropriate).]

## 2022-11-17 ENCOUNTER — TELEPHONE (OUTPATIENT)
Dept: SLEEP CENTER | Facility: CLINIC | Age: 67
End: 2022-11-17

## 2022-11-17 NOTE — TELEPHONE ENCOUNTER
Pt get a sore neck every time he uses the inspire he wants to be seen and then have the study. He states he uses it on and off because it makes his neck sore, I scheduled pt with  the soonest Inspire apt to get it figured out and pushed his sleep study back so that we get  results.

## 2022-12-05 ENCOUNTER — OFFICE VISIT (OUTPATIENT)
Dept: SLEEP CENTER | Facility: CLINIC | Age: 67
End: 2022-12-05

## 2022-12-05 VITALS
SYSTOLIC BLOOD PRESSURE: 126 MMHG | BODY MASS INDEX: 30.94 KG/M2 | HEART RATE: 73 BPM | HEIGHT: 71 IN | DIASTOLIC BLOOD PRESSURE: 74 MMHG | WEIGHT: 221 LBS

## 2022-12-05 DIAGNOSIS — E66.9 OBESITY WITHOUT SERIOUS COMORBIDITY, UNSPECIFIED CLASSIFICATION, UNSPECIFIED OBESITY TYPE: ICD-10-CM

## 2022-12-05 DIAGNOSIS — J45.40 MODERATE PERSISTENT ASTHMA WITHOUT COMPLICATION: ICD-10-CM

## 2022-12-05 DIAGNOSIS — R06.83 SNORING: ICD-10-CM

## 2022-12-05 DIAGNOSIS — G47.19 EXCESSIVE DAYTIME SLEEPINESS: ICD-10-CM

## 2022-12-05 DIAGNOSIS — G47.33 OBSTRUCTIVE SLEEP APNEA SYNDROME: Primary | ICD-10-CM

## 2022-12-05 NOTE — PATIENT INSTRUCTIONS
Sleep Apnea   AMBULATORY CARE:   Sleep apnea  is a condition that causes you to stop breathing often during sleep  Types of sleep apnea:   Obstructive sleep apnea (MILDRED)  is the most common kind  The muscles and tissues around your throat relax and block air from passing through  Obesity, use of alcohol or cigarettes, or a family history are common causes  MILDRED may increase your risk for complications after surgery  Central sleep apnea (CSA)  means your brain does not send signals to the muscles that control breathing  You do not take a breath even though your airway is open  Common causes include medical conditions such as heart failure, being older than 40, or use of opioids  Complex (or mixed) sleep apnea  means you have both obstructive and central sleep apnea  Common signs and symptoms:   Loud snoring or long pauses in breathing    Feeling sleepy, slow, and tired during the day    Snorting, gasping, or choking while you sleep, and waking up suddenly because of these    Feeling irritable during the day    Dry mouth or a headache in the mornings    Heavy night sweating    A hard time thinking, remembering things, or focusing on your tasks the following day    Call your local emergency number (911 in the [address] [facility]) if:   You have chest pain or trouble breathing  Call your doctor if:   You have new or worsening signs or symptoms  You have questions or concerns about your condition or care  Treatment  depends on the kind of apnea you have  A mouth device  may be needed if you have mild sleep apnea  These are designed to keep your throat open  Ask your dentist or healthcare provider about the best mouth device for you  A machine  may be used to help you get more air during sleep  A mask may be placed over your nose and mouth, or just your nose  The mask is hooked to the machine  You will get air through the mask      A continuous positive airway pressure (CPAP) machine  is used to keep your airway open during sleep  The machine blows a gentle stream of air into the mask when you breathe  This helps keep your airway open so you can breathe more regularly  Extra oxygen may be given through the machine  A bilevel positive airway pressure (BiPAP) machine  gives air but lowers the pressure when you breathe out  An adaptive servo-ventilator (ASV)  is a machine that learns your usual breathing pattern  Then, it uses pressure to give you air and prevent stops in your breathing  Surgery  to expand your airway or remove extra tissues may be needed  Surgery is usually only considered if other treatments do not work  Manage or prevent sleep apnea:   Reach and maintain a healthy weight  Ask your healthcare provider what a healthy weight is for you  Ask him or her to help you create a safe weight loss plan if you are overweight  Even a small goal of a 10% weight loss can improve your symptoms  Do not smoke  Nicotine and other chemicals in cigarettes and cigars can cause lung damage  Ask your healthcare provider for information if you currently smoke and need help to quit  E-cigarettes or smokeless tobacco still contain nicotine  Talk to your healthcare provider before you use these products  Do not drink alcohol or take sedative medicine before you go to sleep  Alcohol and sedatives can relax the muscles and tissues around your throat  This can block the airflow to your lungs  Sleep on your side or use pillows designed to prevent sleep apnea  This prevents your tongue or other tissues from blocking your throat  You can also raise the head of your bed  Follow up with your doctor or specialist as directed: You may need to have blood tests during your follow-up visits  Work with your provider to find the right breathing support equipment and settings for you  Write down your questions so you remember to ask them during your visits    © Copyright [employer] 2022 Information is for End User's use only and may not be sold, redistributed or otherwise used for commercial purposes  All illustrations and images included in CareNotes® are the copyrighted property of A [employer] M [employer]  or Zita Pearce  The above information is an  only  It is not intended as medical advice for individual conditions or treatments  Talk to your doctor, nurse or pharmacist before following any medical regimen to see if it is safe and effective for you

## 2022-12-05 NOTE — ASSESSMENT & PLAN NOTE
• Moderate obstructive sleep apnea diagnosed in 2021 with a baseline AHI of 23 7 events per hour  • His inspire was implanted in September 2021 he was on 2 7-3 7 configuration A +-+  • Had a fine tuning study in January seem to have significant improvement of his sleep apnea at 3 4 v  • Unfortunately, due to intolerance issues he was down only at 2 7 volts and he had a repeat home study recently on that that showed to have high residual AHI  He was switched his own configuration completely in last visit 10/2022 to configuration B -0-, at 1 3 volts he had a significant improvement of his tongue protrusion and he felt way more comfortable in terms of his right-sided neck pain and back pain, however, he comes back today with with persistent neck pain  He has been using/trying to use inspire intermittently however overall not consistently  I changed his pulse width today to 60/40 and he felt significantly comfortable on that with not much neck pulling feeling    I would like him to see get the repeat home sleep study  He knows the risks of untreated obstructive sleep    I also increased his start delay time 50 minutes from 30 previous to give him more time to fall asleep and to be in deeper sleep issues of sleep in hope that this would help him tolerating the therapy better    Ran the wave form with no significant reflects

## 2022-12-05 NOTE — PROGRESS NOTES
Review of Systems      Genitourinary need to urinate more than twice a night and difficulty with erection   Cardiology none   Gastrointestinal none   Neurology none   Constitutional fatigue   Integumentary none   Psychiatry none   Musculoskeletal legs twitching/jerking   Pulmonary wheezing and frequent cough   ENT ringing in ears   Endocrine excessive thirst   Hematological blood donor

## 2022-12-05 NOTE — PROGRESS NOTES
Pulmonary/Sleep Follow Up Note   Ruby Flor 75 y o  male MRN: [id number]  12/5/2022      Assessment and Plan:    1  Obstructive sleep apnea syndrome  Assessment & Plan:  • Moderate obstructive sleep apnea diagnosed in 2021 with a baseline AHI of 23 7 events per hour  • His inspire was implanted in September 2021 he was on 2 7-3 7 configuration A +-+  • Had a fine tuning study in January seem to have significant improvement of his sleep apnea at 3 4 v  • Unfortunately, due to intolerance issues he was down only at 2 7 volts and he had a repeat home study recently on that that showed to have high residual AHI  He was switched his own configuration completely in last visit 10/2022 to configuration B -0-, at 1 3 volts he had a significant improvement of his tongue protrusion and he felt way more comfortable in terms of his right-sided neck pain and back pain, however, he comes back today with with persistent neck pain  He has been using/trying to use inspire intermittently however overall not consistently  I changed his pulse width today to 60/40 and he felt significantly comfortable on that with not much neck pulling feeling  I would like him to see get the repeat home sleep study  He knows the risks of untreated obstructive sleep    I also increased his start delay time 50 minutes from 30 previous to give him more time to fall asleep and to be in deeper sleep issues of sleep in hope that this would help him tolerating the therapy better    Ran the wave form with no significant reflects        2  Moderate persistent asthma without complication  Assessment & Plan:  He has been controlled he uses Advair Diskus and albuterol as      3  Obesity without serious comorbidity, unspecified classification, unspecified obesity type    4  Excessive daytime sleepiness    5  Snoring  Assessment & Plan:  Suboptimally treated MILDRED, adjusted inspire as detailed above        No follow-ups on file      History of Present Illness HPI:  Antonio Benjamin is a 75 y o  male who is here for follow-up appointment the patient has history of severe MILDRED with an AHI of 30 events per hour underwent inspire/hypoglossal nerve stimulation activation he underwent a fine tuning study 3 months afterwards with significant improvement of his sleep disordered breathing events down to a 2 events per hour  The patient started to experience intolerance issues he mainly describes it out right-sided neck pain and sometimes associated with back pain neck pain for that he started to go down his inspire voltage to 2 6-2 7 and he remained that and even with that he was not compliant using it or turning it on every night he underwent a home sleep study recently that seem to have high residual AHI as detailed below  Adjustment of the configuration was done a subsequent visit with some improvement however still with persistent right-sided neck tensioning pain and he is here back to readjust        Review of Systems      Genitourinary need to urinate more than twice a night and difficulty with erection   Cardiology none   Gastrointestinal none   Neurology none   Constitutional fatigue   Integumentary none   Psychiatry none   Musculoskeletal legs twitching/jerking   Pulmonary wheezing and frequent cough   ENT ringing in ears   Endocrine excessive thirst   Hematological blood donor           Historical Information   Past Medical History:   Diagnosis Date   • Allergic rhinitis    • Arthritis    • Asthma    • COVID-19 12/2020   • Heart attack [facility])    • Hypertensive disorder 7/15/2013   • Myocardial infarction ([facility] [address] )     2012 / 2018   • Sleep apnea     does not use C-PAP     Past Surgical History:   Procedure Laterality Date   • CORONARY ARTERY BYPASS GRAFT  2012    2 stents   2018 stent / 2012 3 vessel bypass   • EXAMINATION UNDER ANESTHESIA N/A 9/24/2020    Procedure: DRUG INDUCED SLEEP ENDOSCOPY;  Surgeon: Rod Esquivel MD;  Location: [facility];  Service: ENT   • JOINT REPLACEMENT     • [place] INCISION IMPLANT CRANIAL NERVE STIM ELECTRODE/PULSE GEN N/A 9/8/2021    Procedure: INSERTION UPPER AIRWAY STIMULATOR, INSPIRE IMPLANT;  Surgeon: Joseph Ng MD;  [facility]: [facility];  Service: ENT   • REPLACEMENT TOTAL KNEE BILATERAL     • ROTATOR CUFF REPAIR       Family History   Problem Relation Age of Onset   • Cancer Brother    • Melanoma Brother          Meds/Allergies     Current Outpatient Medications:   •  albuterol (2 5 mg/3 mL) 0 083 % nebulizer solution, inhale contents of 1 vial ( 3 milliliters ) in nebulizer by mouth     (REFER TO PRESCRIPTION NOTES)  , Disp: , Rfl:   •  albuterol (PROVENTIL HFA,VENTOLIN HFA) 90 mcg/act inhaler, Inhale 1-2 puffs, Disp: , Rfl:   •  aspirin 81 MG tablet, Take 1 tablet by mouth daily, Disp: , Rfl:   •  benzonatate (TESSALON) 200 MG capsule, , Disp: , Rfl:   •  benzonatate (TESSALON) 200 MG capsule, Take 200 mg by mouth 3 (three) times a day, Disp: , Rfl:   •  Cholecalciferol 50 MCG (2000 UT) CAPS, Daily, Disp: , Rfl:   •  clopidogrel (PLAVIX) 75 mg tablet, clopidogrel 75 mg tablet, Disp: , Rfl:   •  dexamethasone (DECADRON) 4 mg tablet, dexamethasone 4 mg tablet  TAKE 1 TABLET BY MOUTH TWICE A DAY, Disp: , Rfl:   •  dexamethasone (DECADRON) 6 mg tablet, dexamethasone 6 mg tablet  TAKE 1 TABLET BY MOUTH TWICE A DAY, Disp: , Rfl:   •  fluticasone-salmeterol (Advair) 250-50 mcg/dose inhaler, Inhale 1 puff 2 (two) times a day, Disp: , Rfl:   •  Fluticasone-Salmeterol (Advair) 500-50 mcg/dose inhaler, Advair Diskus 500 mcg-50 mcg/dose powder for inhalation, Disp: , Rfl:   •  levothyroxine 75 mcg tablet, , Disp: , Rfl:   •  metoprolol succinate (TOPROL-XL) 25 mg 24 hr tablet, Toprol XL 25 mg tablet,extended release, Disp: , Rfl:   •  mometasone (ELOCON) 0 1 % lotion, Apply topically as needed (2 drops each ear canal once daily as needed) 2 drops to each ear canal as needed, Disp: 60 mL, Rfl: 3  •  montelukast (SINGULAIR) 10 mg tablet, montelukast 10 mg tablet, Disp: , Rfl:   •  Multiple Vitamins-Minerals (VITAMIN D3 COMPLETE PO), Daily, Disp: , Rfl:   •  OMEGA-3 FATTY ACIDS PO, 360mg, Disp: , Rfl:   •  omeprazole (PriLOSEC) 20 mg delayed release capsule, Take 20 mg by mouth daily, Disp: , Rfl:   •  Repatha SureClick 571 MG/ML SOAJ, , Disp: , Rfl:   •  SODIUM FLUORIDE, DENTAL GEL, 1 1 % GEL, PreviDent 5000 Booster Plus 1 1 % dental paste  BRUSH 1 TIME DAILY PREFERABLY BEFORE BED, DO NOT RINSE AFTER USE, Disp: , Rfl:   •  tadalafil (CIALIS) 5 MG tablet, Take 1 tablet (5 mg total) by mouth in the morning, Disp: 90 tablet, Rfl: 1  •  tamsulosin (FLOMAX) 0 4 mg, Take 1 capsule (0 4 mg total) by mouth daily with dinner, Disp: 90 capsule, Rfl: 3  •  tiotropium (Spiriva Respimat) 1 25 MCG/ACT AERS inhaler, Spiriva Respimat 1 25 mcg/actuation solution for inhalation  2 puffs daily, Disp: , Rfl:   •  tiotropium (SPIRIVA) 18 mcg inhalation capsule, Place 18 mcg into inhaler and inhale daily, Disp: , Rfl:   •  fenofibrate (TRICOR) 54 MG tablet, Take 75 mg by mouth daily  (Patient not taking: Reported on 8/18/2022), Disp: , Rfl:   •  Influenza Vac A&B SA Adj Quad (Fluad Quadrivalent) 0 5 ML Larri Ranjan [id number](95BF up)(PF) 60 mcg (15 mcg x 4)/0 5mL IM syringe  inject 0 5 milliliters intramuscularly (Patient not taking: Reported on 8/18/2022), Disp: , Rfl:   •  levothyroxine 75 mcg tablet, , Disp: , Rfl:   •  methocarbamol (ROBAXIN) 750 mg tablet, methocarbamol 750 mg tablet  take 1 tablet by mouth nightly if needed for muscle spasm (Patient not taking: No sig reported), Disp: , Rfl:   •  nitroglycerin (NITROSTAT) 0 4 mg SL tablet, nitroglycerin 0 4 mg sublingual tablet  PLACE 1 TABLET (0 4 MG) BY SUBLINGUAL ROUTE EVERY 5 MINUTES AS NEEDEDFOR CHEST PAIN  DO NOT EXCEED 3 DOSES IN 15 MINUTES   (Patient not taking: No sig reported), Disp: , Rfl:   •  pramipexole (MIRAPEX) 0 25 mg tablet, Take 1 tablet (0 25 mg total) by mouth daily at bedtime (Patient not taking: Reported on 5/4/2022), Disp: 30 tablet, Rfl: 0  •  pravastatin (PRAVACHOL) 40 mg tablet, pravastatin 40 mg tablet (Patient not taking: Reported on 8/18/2022), Disp: , Rfl:   •  Zoster Vac Recomb Adjuvanted (Shingrix) 50 MCG/0 5ML SUSR, Shingrix (PF) 50 mcg/0 5 mL intramuscular suspension, kit (Patient not taking: Reported on 8/18/2022), Disp: , Rfl:   Allergies   Allergen Reactions   • Sulfa Antibiotics    • Clarithromycin Rash   • Penicillins Hives and Rash       Vitals: Blood pressure 126/74, pulse 73, height 5' 11" (1 803 m), weight 100 kg (221 lb)  Body mass index is 30 82 kg/m²  Physical Exam  General:  Awake alert and oriented x 3, conversant without conversational dyspnea, NAD, normal affect  HEENT:   Sclera noninjected, nonicteric OU, Nares patent,  no craniofacial abnormalities, Mucous membranes, moist, no oral lesions, normal dentition  NECK:  Trachea midline, no accessory muscle use, no stridor,  JVP not elevated  CARDIAC: Reg, single s1/S2, no m/r/g  PULM: CTA bilaterally no wheezing, rhonchi or rales  ABD: Soft nontender, nondistended, no rebound, no rigidity, no guarding  EXT: No cyanosis, no clubbing, no edema, normal capillary refill  NEURO: no focal neurologic deficits, AAOx3, moving all extremities appropriately    Labs: I have personally reviewed pertinent lab results  , ABG: No results found for: PHART, HFS8RAQ, PO2ART, RLH9MFZ, U5VLYKGG, BEART, SOURCE, BNP: No results found for: BNP, CBC: No results found for: WBC, HGB, HCT, MCV, PLT, ADJUSTEDWBC, MCH, MCHC, RDW, MPV, NRBC, CMP: No results found for: SODIUM, K, CL, CO2, ANIONGAP, BUN, CREATININE, GLUCOSE, CALCIUM, AST, ALT, ALKPHOS, PROT, BILITOT, EGFR, PT/INR: No results found for: PT, INR, Troponin: No results found for: TROPONINI  Lab Results   Component Value Date    WBC 4 63 02/14/2022    HGB 15 5 02/14/2022    HCT 47 1 02/14/2022    MCV 90 02/14/2022     02/14/2022     Lab Results   Component Value Date    CALCIUM 8 9 02/14/2022    K 4 2 02/14/2022    CO2 25 02/14/2022     (H) 02/14/2022    BUN 11 02/14/2022    CREATININE 1 04 02/14/2022     No results found for: IGE  Lab Results   Component Value Date    ALT 26 02/14/2022    AST 20 02/14/2022    ALKPHOS 98 02/14/2022         Sleep studies: I have personally reviewed pertinent reports  PSG with inspire 1/2022  The patient slept for a total 393 minutes representing sleep efficiency of 90%  He had significant reduction of sleep disordered breathing events down from an average AHI of 30 events per hour on the control night study to 1 5 events per hour with titrating the inspire up to 3 4 volts  Thus continuing to use inspire with patient control range of 3 2 to 4 2 volts with an amplitude of 3 4 volts would be recommended  In addition, the patient has increased number of periodic limb movements with a PLM index of 96 9 events per hour  Excessive daytime sleepiness persist after successful treatment of sleep disordered breathing; checking a CBC, BUN, creatinine, TSH, vitamin B12, vitamin-D, magnesium, iron studies including ferritin to rule out underlying metabolic disorders associated with periodic limb movement disorder would be recommended, clinical correlation suggested  HST 8/2022  Mr Kimi Miles demonstrated an increased number of sleep related respiratory events (FANNIE - 25 3) which is consistent with moderate obstructive sleep apnea  The vast majority of these events were obstructive apnea but there were a few central events noted as well (AUGIE - 3 2, mixed apnea 1 5)  I would encourage advancing Inspire therapy as tolerated  Sarthak Dunne MD  [facility]       Portions of the record may have been created with voice recognition software  Occasional wrong word or "sound a like" substitutions may have occurred due to the inherent limitations of voice recognition software   Read the chart carefully and recognize, using context, where substitutions have occurred

## 2022-12-07 RX ORDER — OSELTAMIVIR PHOSPHATE 75 MG/1
CAPSULE ORAL
COMMUNITY
End: 2022-12-12 | Stop reason: ALTCHOICE

## 2022-12-12 ENCOUNTER — OFFICE VISIT (OUTPATIENT)
Dept: UROLOGY | Facility: CLINIC | Age: 67
End: 2022-12-12

## 2022-12-12 VITALS
BODY MASS INDEX: 30.94 KG/M2 | WEIGHT: 221 LBS | DIASTOLIC BLOOD PRESSURE: 80 MMHG | HEIGHT: 71 IN | HEART RATE: 90 BPM | SYSTOLIC BLOOD PRESSURE: 120 MMHG

## 2022-12-12 DIAGNOSIS — F52.32 ANORGASMIA OF MALE: ICD-10-CM

## 2022-12-12 DIAGNOSIS — N52.9 ERECTILE DYSFUNCTION, UNSPECIFIED ERECTILE DYSFUNCTION TYPE: ICD-10-CM

## 2022-12-12 DIAGNOSIS — Z12.5 PROSTATE CANCER SCREENING: Primary | ICD-10-CM

## 2022-12-12 RX ORDER — PROMETHAZINE HYDROCHLORIDE AND CODEINE PHOSPHATE 6.25; 1 MG/5ML; MG/5ML
SYRUP ORAL
COMMUNITY
Start: 2022-12-08

## 2022-12-12 RX ORDER — TADALAFIL 5 MG/1
5 TABLET ORAL DAILY
Qty: 90 TABLET | Refills: 3 | Status: SHIPPED | OUTPATIENT
Start: 2022-12-12

## 2022-12-12 NOTE — PROGRESS NOTES
Assessment and plan: Anorgasmia of male  · Not on SSRI, possibly secondary to medications  · Continue daily Cialis and on-demand Cialis  · A1c 5 2    Erectile dysfunction  · Continue daily Cialis  · On demand Cialis as needed  · Follow-up 1 year    Prostate cancer screening  · PSA 2 85  · Follow-up 1 year          VANESSA Forbes    History of Present Illness     Bashir Wei is a 75 y o  male who presents for a 4 months follow up  He reports that "after covid I could not ejaculate"  He also had erectile dysfunction  He was initially started on on-demand Cialis and tamsulosin  He was started on daily cialis at his last visit  Which helped with his erections  He is bothered by his inability to ejaculate  This may be secondary to flomax but he feels this has been ongoing since prior to establishing care in may 2022  He does not feel the Flomax has helped his urinary symptoms and would like to discontinue this especially due to lack of ejaculate  PSA 2 85 (September 2022)  a1c 5 2  Laboratory     Lab Results   Component Value Date    BUN 11 02/14/2022    CREATININE 1 04 02/14/2022       No components found for: GFR    Lab Results   Component Value Date    CALCIUM 8 9 02/14/2022    K 4 2 02/14/2022    CO2 25 02/14/2022     (H) 02/14/2022       Lab Results   Component Value Date    WBC 4 63 02/14/2022    HGB 15 5 02/14/2022    HCT 47 1 02/14/2022    MCV 90 02/14/2022     02/14/2022       Lab Results   Component Value Date    PSA 2 5 08/16/2022    PSA 2 2 07/27/2021    PSA 1 5 01/23/2020       No results found for this or any previous visit (from the past 1 hour(s))  @RESULT(URINEMICROSCOPIC)@    @RESULT(URINECULTURE)@    Radiology       Review of Systems     Review of Systems   Constitutional: Negative for activity change, appetite change, chills, fatigue, fever and unexpected weight change  HENT: Negative for facial swelling  Eyes: Negative for discharge     Respiratory: Negative  Negative for cough and shortness of breath  Cardiovascular: Negative for chest pain and leg swelling  Gastrointestinal: Negative  Negative for abdominal distention, abdominal pain, constipation, diarrhea, nausea and vomiting  Endocrine: Negative  Genitourinary: Negative  Negative for decreased urine volume, difficulty urinating, dysuria, enuresis, flank pain, frequency, genital sores, hematuria and urgency  Musculoskeletal: Negative for back pain and myalgias  Skin: Negative for pallor and rash  Allergic/Immunologic: Negative  Negative for immunocompromised state  Neurological: Negative for facial asymmetry and speech difficulty  Psychiatric/Behavioral: Negative for agitation and confusion  AUA SYMPTOM SCORE    Flowsheet Row Most Recent Value   AUA SYMPTOM SCORE    How often have you had a sensation of not emptying your bladder completely after you finished urinating? 1   How often have you had to urinate again less than two hours after you finished urinating? 1   How often have you found you stopped and started again several times when you urinate? 1   How often have you found it difficult to postpone urination? 1   How often have you had a weak urinary stream? 1   How often have you had to push or strain to begin urination? 1   How many times did you most typically get up to urinate from the time you went to bed at night until the time you got up in the morning? 1   Quality of Life: If you were to spend the rest of your life with your urinary condition just the way it is now, how would you feel about that? 1   AUA SYMPTOM SCORE 7                Allergies     Allergies   Allergen Reactions   • Sulfa Antibiotics    • Clarithromycin Rash   • Penicillins Hives and Rash       Physical Exam     Physical Exam  Vitals reviewed  Constitutional:       General: He is not in acute distress  Appearance: Normal appearance  He is normal weight   He is not ill-appearing, toxic-appearing or diaphoretic  HENT:      Head: Normocephalic and atraumatic  Eyes:      General: No scleral icterus  Cardiovascular:      Rate and Rhythm: Normal rate  Pulmonary:      Effort: Pulmonary effort is normal  No respiratory distress  Abdominal:      General: Abdomen is flat  There is no distension  Musculoskeletal:         General: No swelling  Cervical back: Normal range of motion  Skin:     General: Skin is warm and dry  Coloration: Skin is not jaundiced or pale  Findings: No rash  Neurological:      General: No focal deficit present  Mental Status: He is alert and oriented to person, place, and time  Gait: Gait normal    Psychiatric:         Mood and Affect: Mood normal          Behavior: Behavior normal          Thought Content: Thought content normal          Judgment: Judgment normal          Vital Signs     Vitals:    12/12/22 1043   BP: 120/80   BP Location: Left arm   Patient Position: Sitting   Cuff Size: Large   Pulse: 90   Weight: 100 kg (221 lb)   Height: 5' 11" (1 803 m)       Current Medications       Current Outpatient Medications:   •  albuterol (2 5 mg/3 mL) 0 083 % nebulizer solution, inhale contents of 1 vial ( 3 milliliters ) in nebulizer by mouth     (REFER TO PRESCRIPTION NOTES)  , Disp: , Rfl:   •  albuterol (PROVENTIL HFA,VENTOLIN HFA) 90 mcg/act inhaler, Inhale 1-2 puffs, Disp: , Rfl:   •  aspirin 81 MG tablet, Take 1 tablet by mouth daily, Disp: , Rfl:   •  benzonatate (TESSALON) 200 MG capsule, Take 200 mg by mouth 3 (three) times a day, Disp: , Rfl:   •  Cholecalciferol 50 MCG (2000 UT) CAPS, Daily, Disp: , Rfl:   •  clopidogrel (PLAVIX) 75 mg tablet, clopidogrel 75 mg tablet, Disp: , Rfl:   •  dexamethasone (DECADRON) 6 mg tablet, dexamethasone 6 mg tablet  TAKE 1 TABLET BY MOUTH TWICE A DAY, Disp: , Rfl:   •  fluticasone-salmeterol (Advair) 250-50 mcg/dose inhaler, Inhale 1 puff 2 (two) times a day, Disp: , Rfl:   • Fluticasone-Salmeterol (Advair) 500-50 mcg/dose inhaler, Advair Diskus 500 mcg-50 mcg/dose powder for inhalation, Disp: , Rfl:   •  metoprolol succinate (TOPROL-XL) 25 mg 24 hr tablet, Toprol XL 25 mg tablet,extended release, Disp: , Rfl:   •  montelukast (SINGULAIR) 10 mg tablet, montelukast 10 mg tablet, Disp: , Rfl:   •  Multiple Vitamins-Minerals (VITAMIN D3 COMPLETE PO), Daily, Disp: , Rfl:   •  OMEGA-3 FATTY ACIDS PO, 360mg, Disp: , Rfl:   •  omeprazole (PriLOSEC) 20 mg delayed release capsule, Take 20 mg by mouth daily, Disp: , Rfl:   •  promethazine-codeine (PHENERGAN WITH CODEINE) 6 25-10 mg/5 mL syrup, , Disp: , Rfl:   •  Repatha SureClick 467 MG/ML SOAJ, , Disp: , Rfl:   •  SODIUM FLUORIDE, DENTAL GEL, 1 1 % GEL, PreviDent 5000 Booster Plus 1 1 % dental paste  BRUSH 1 TIME DAILY PREFERABLY BEFORE BED, DO NOT RINSE AFTER USE, Disp: , Rfl:   •  tadalafil (CIALIS) 5 MG tablet, Take 1 tablet (5 mg total) by mouth in the morning, Disp: 90 tablet, Rfl: 3  •  tiotropium (Spiriva Respimat) 1 25 MCG/ACT AERS inhaler, Spiriva Respimat 1 25 mcg/actuation solution for inhalation  2 puffs daily, Disp: , Rfl:   •  tiotropium (SPIRIVA) 18 mcg inhalation capsule, Place 18 mcg into inhaler and inhale daily, Disp: , Rfl:   •  fenofibrate (TRICOR) 54 MG tablet, Take 75 mg by mouth daily, Disp: , Rfl:   •  Influenza Vac A&B SA Adj Quad (Fluad Quadrivalent) 0 5 ML PRSY, , Disp: , Rfl:   •  levothyroxine 75 mcg tablet, , Disp: , Rfl:   •  methocarbamol (ROBAXIN) 750 mg tablet, methocarbamol 750 mg tablet  take 1 tablet by mouth nightly if needed for muscle spasm, Disp: , Rfl:   •  mometasone (ELOCON) 0 1 % lotion, Apply topically as needed (2 drops each ear canal once daily as needed) 2 drops to each ear canal as needed (Patient not taking: Reported on 12/12/2022), Disp: 60 mL, Rfl: 3  •  nitroglycerin (NITROSTAT) 0 4 mg SL tablet, nitroglycerin 0 4 mg sublingual tablet  PLACE 1 TABLET (0 4 MG) BY SUBLINGUAL ROUTE EVERY 5 MINUTES AS NEEDEDFOR CHEST PAIN  DO NOT EXCEED 3 DOSES IN 15 MINUTES  (Patient not taking: No sig reported), Disp: , Rfl:   •  pramipexole (MIRAPEX) 0 25 mg tablet, Take 1 tablet (0 25 mg total) by mouth daily at bedtime, Disp: 30 tablet, Rfl: 0  •  Zoster Vac Recomb Adjuvanted (Shingrix) 50 MCG/0 5ML SUSR, , Disp: , Rfl:     Active Problems     Patient Active Problem List   Diagnosis   • STEMI (ST elevation myocardial infarction) ([facility] [address] )   • Obstructive sleep apnea   • Sleep apnea   • Smoking   • History of coronary artery bypass graft   • Status post aorto-coronary artery bypass graft   • Excessive daytime sleepiness   • Obesity   • Allergic rhinitis   • CAD (coronary artery disease), native coronary artery   • Cervical radiculopathy   • Chronic back pain   • Chronic low back pain   • Closed fracture of shoulder   • Disorder of lumbar spine   • Hypertensive disorder   • Macular degeneration   • Mixed hyperlipidemia   • Moderate persistent asthma without complication   • Preop pulmonary/respiratory exam   • Rotator cuff injury   • S/P PTCA (percutaneous transluminal coronary angioplasty)   • Periodic limb movement disorder   • Iron deficiency   • B12 deficiency   • Snoring   • Anorgasmia of male   • Prostate cancer screening   • Erectile dysfunction       Past Medical History     Past Medical History:   Diagnosis Date   • Allergic rhinitis    • Arthritis    • Asthma    • COVID-19 12/2020   • Heart attack [facility])    • Hypertensive disorder 7/15/2013   • Myocardial infarction [facility])     2012 / 2018   • Sleep apnea     does not use C-PAP       Surgical History     Past Surgical History:   Procedure Laterality Date   • CORONARY ARTERY BYPASS GRAFT  2012    2 stents   2018 stent / 2012 3 vessel bypass   • EXAMINATION UNDER ANESTHESIA N/A 9/24/2020    Procedure: DRUG INDUCED SLEEP ENDOSCOPY;  Surgeon: Reny Haynes MD;  [facility]: [facility];  Service: ENT   • JOINT REPLACEMENT     • [place] OPEN IMPLANTATION CRANIAL NERVE SCOOBY & PULSE GEN N/A 2021    Procedure: INSERTION UPPER AIRWAY STIMULATOR, INSPIRE IMPLANT;  Surgeon: Leticia Gomez MD;  [facility];  Service: ENT   • REPLACEMENT TOTAL KNEE BILATERAL     • ROTATOR CUFF REPAIR         Family History     Family History   Problem Relation Age of Onset   • Cancer Brother    • Melanoma Brother        Social History     Social History     Social History     Tobacco Use   Smoking Status Former   • Packs/day: 0 50   • Years: 6 00   • Pack years: 3 00   • Types: Cigarettes   • Quit date:    • Years since quittin 9   Smokeless Tobacco Former       Past Surgical History:   Procedure Laterality Date   • CORONARY ARTERY BYPASS GRAFT      2 stents      stent /  3 vessel bypass   • EXAMINATION UNDER ANESTHESIA N/A 2020    Procedure: DRUG INDUCED SLEEP ENDOSCOPY;  Surgeon: Leticia Gomez MD;  [facility];  Service: ENT   • JOINT REPLACEMENT     • [place] OPEN IMPLANTATION CRANIAL NERVE SCOOBY & PULSE GEN N/A 2021    Procedure: INSERTION UPPER AIRWAY STIMULATOR, INSPIRE IMPLANT;  Surgeon: Leticia Gomez MD;  [facility];  Service: ENT   • REPLACEMENT TOTAL KNEE BILATERAL     • ROTATOR CUFF REPAIR           The following portions of the patient's history were reviewed and updated as appropriate: allergies, current medications, past family history, past medical history, past social history, past surgical history and problem list    Please note :  Voice dictation software has been used to create this document  There may be inadvertent transcription errors      [id number] [address] [id number] Lorraine Lutz

## 2022-12-12 NOTE — ASSESSMENT & PLAN NOTE
· Not on SSRI, possibly secondary to medications  · Continue daily Cialis and on-demand Cialis  · A1c 5 2

## 2022-12-12 NOTE — PATIENT INSTRUCTIONS
I sent your prescription [facility], you will need to set up an account with them, they will email you when the medication is ready and you will need to link your account

## 2023-01-06 ENCOUNTER — HOSPITAL ENCOUNTER (OUTPATIENT)
Dept: SLEEP CENTER | Facility: HOSPITAL | Age: 68
Discharge: HOME | End: 2023-01-06
Attending: INTERNAL MEDICINE

## 2023-01-06 DIAGNOSIS — G47.19 EXCESSIVE DAYTIME SLEEPINESS: ICD-10-CM

## 2023-01-06 DIAGNOSIS — R06.83 SNORING: ICD-10-CM

## 2023-01-06 DIAGNOSIS — G47.33 OBSTRUCTIVE SLEEP APNEA: ICD-10-CM

## 2023-01-12 NOTE — PROGRESS NOTES
Home Sleep Study Documentation    HOME STUDY DEVICE: Noxturnal no                                           Lu G3 yes      Pre-Sleep Home Study:    Set-up and instructions performed by: Rajani Adams    Technician performed demonstration for Patient: yes    Return demonstration performed by Patient: yes    Written instructions provided to Patient: yes    Patient signed consent form: yes        Post-Sleep Home Study:    Additional comments by Patient: none    Home Sleep Study Failed:no:    Failure reason: N/A    Reported or Detected: N/A    Scored by: LINNEA [place], DIGNAGT

## 2023-01-16 ENCOUNTER — TELEPHONE (OUTPATIENT)
Dept: SLEEP CENTER | Facility: CLINIC | Age: 68
End: 2023-01-16

## 2023-02-06 ENCOUNTER — OFFICE VISIT (OUTPATIENT)
Dept: SLEEP CENTER | Facility: CLINIC | Age: 68
End: 2023-02-06

## 2023-02-06 VITALS
WEIGHT: 232.2 LBS | HEIGHT: 71 IN | DIASTOLIC BLOOD PRESSURE: 57 MMHG | BODY MASS INDEX: 32.51 KG/M2 | HEART RATE: 76 BPM | SYSTOLIC BLOOD PRESSURE: 121 MMHG

## 2023-02-06 DIAGNOSIS — G47.33 OBSTRUCTIVE SLEEP APNEA: Primary | ICD-10-CM

## 2023-02-06 DIAGNOSIS — J45.40 MODERATE PERSISTENT ASTHMA WITHOUT COMPLICATION: ICD-10-CM

## 2023-02-06 DIAGNOSIS — R06.83 SNORING: ICD-10-CM

## 2023-02-06 DIAGNOSIS — G47.61 PLMD (PERIODIC LIMB MOVEMENT DISORDER): ICD-10-CM

## 2023-02-06 PROBLEM — E53.8 B12 DEFICIENCY: Status: RESOLVED | Noted: 2022-02-16 | Resolved: 2023-02-06

## 2023-02-06 PROBLEM — E61.1 IRON DEFICIENCY: Status: RESOLVED | Noted: 2022-02-16 | Resolved: 2023-02-06

## 2023-02-06 NOTE — ASSESSMENT & PLAN NOTE
Is a high PL movement index, the patient had an iron study and that was normal with ferritin 180, tried on a small dose Mirapex he did not continue it, offered gabapentin and he refused

## 2023-02-06 NOTE — PROGRESS NOTES
Pulmonary/Sleep Follow Up Note   Jeremie López 75 y o  male MRN: [id number]  2/8/2023      Assessment and Plan:    1  Obstructive sleep apnea  Assessment & Plan:  • Moderate obstructive sleep apnea diagnosed in 2021 with a baseline AHI of 23 7 events per hour  • His inspire was implanted in September 2021 he was on 2 7-3 7 configuration A +-+  • Had a fine tuning study in January seem to have significant improvement of his sleep apnea at 3 4 v  • Unfortunately, due to intolerance issues he was down only at 2 7 volts and he had a repeat home study recently on that that showed to have high residual AHI  He was switched his own configuration completely 10/2022 to configuration B 0-0, at 1 3 volts he had a significant improvement of his tongue protrusion and he felt way more comfortable in terms of his right-sided neck pain and back pain, however after that he has another visit for persistent neck pain and his pulse width was adjusted to 60/40 with improvement, he underwent another home sleep study afterwards with AHI of 8 6 total    I recommended to Alejandro Ochoa to continue advancing we will follow-up in 6 months and if he is able to achieve levels up to 1 7 or 8 V we will obtain another home sleep study, currently was 1 5 V and he tells me that it is comfortable      2  Moderate persistent asthma without complication  Assessment & Plan:  Not in exacerbation, stable chronic on Advair and albuterol      3  PLMD (periodic limb movement disorder)  Assessment & Plan:  Is a high PL movement index, the patient had an iron study and that was normal with ferritin 180, tried on a small dose Mirapex he did not continue it, offered gabapentin and he refused      4  Snoring  Assessment & Plan:  That has recovered since he had the inspire        Return in about 6 months (around 8/6/2023)      History of Present Illness   HPI:  Jeremie López is a 75 y o  male who is here for follow-up appointment the patient has history of severe MILDRED with an AHI of 30 events per hour underwent inspire/hypoglossal nerve stimulation activation he underwent a fine tuning study 3 months afterwards with significant improvement of his sleep disordered breathing events down to a 2 events per hour  Interval history:  The patient started to experience intolerance issues he mainly describes it out right-sided neck pain and sometimes associated with back pain neck pain for that he started to go down his inspire voltage to 2 6-2 7 and he remained that and even with that he was not compliant using it or turning it on every night he underwent a home sleep study recently that seem to have high residual AHI as detailed below  Adjustment of the configuration was done a subsequent visit with some improvement however still with persistent right-sided neck tensioning pain and he is here back to readjust    Adjustments of the pulse width rate has been done on the following visit with improvement of the neck tolerance, he has been compliant using the inspire        Review of Systems      Other systems reviewed negative except as indicated above      Historical Information   Past Medical History:   Diagnosis Date   • Allergic rhinitis    • Arthritis    • Asthma    • COVID-19 12/2020   • Heart attack [facility])    • Hypertensive disorder 7/15/2013   • Myocardial infarction [facility])     2012 / 2018   • Sleep apnea     does not use C-PAP     Past Surgical History:   Procedure Laterality Date   • CORONARY ARTERY BYPASS GRAFT  2012    2 stents   2018 stent / 2012 3 vessel bypass   • EXAMINATION UNDER ANESTHESIA N/A 9/24/2020    Procedure: DRUG INDUCED SLEEP ENDOSCOPY;  Surgeon: Javier Mireles MD;  Location: [facility];  Service: ENT   • JOINT REPLACEMENT     • NM OPEN IMPLANTATION CRANIAL NERVE SCOOBY & PULSE GEN N/A 9/8/2021    Procedure: INSERTION UPPER AIRWAY STIMULATOR, INSPIRE IMPLANT;  Surgeon: Javier Mireles MD;  Location: [facility];  Service: ENT   • REPLACEMENT TOTAL KNEE BILATERAL • ROTATOR CUFF REPAIR       Family History   Problem Relation Age of Onset   • Cancer Brother    • Melanoma Brother          Meds/Allergies     Current Outpatient Medications:   •  albuterol (2 5 mg/3 mL) 0 083 % nebulizer solution, inhale contents of 1 vial ( 3 milliliters ) in nebulizer by mouth     (REFER TO PRESCRIPTION NOTES)  , Disp: , Rfl:   •  albuterol (PROVENTIL HFA,VENTOLIN HFA) 90 mcg/act inhaler, Inhale 1-2 puffs, Disp: , Rfl:   •  aspirin 81 MG tablet, Take 1 tablet by mouth daily, Disp: , Rfl:   •  benzonatate (TESSALON) 200 MG capsule, Take 200 mg by mouth 3 (three) times a day, Disp: , Rfl:   •  Cholecalciferol 50 MCG (2000 UT) CAPS, Daily, Disp: , Rfl:   •  clopidogrel (PLAVIX) 75 mg tablet, clopidogrel 75 mg tablet, Disp: , Rfl:   •  dexamethasone (DECADRON) 6 mg tablet, dexamethasone 6 mg tablet  TAKE 1 TABLET BY MOUTH TWICE A DAY, Disp: , Rfl:   •  fenofibrate (TRICOR) 54 MG tablet, Take 75 mg by mouth daily, Disp: , Rfl:   •  fluticasone-salmeterol (Advair) 250-50 mcg/dose inhaler, Inhale 1 puff 2 (two) times a day, Disp: , Rfl:   •  Fluticasone-Salmeterol (Advair) 500-50 mcg/dose inhaler, Advair Diskus 500 mcg-50 mcg/dose powder for inhalation, Disp: , Rfl:   •  Influenza Vac A&B SA Adj Quad (Fluad Quadrivalent) 0 5 ML PRSY, , Disp: , Rfl:   •  levothyroxine 75 mcg tablet, , Disp: , Rfl:   •  methocarbamol (ROBAXIN) 750 mg tablet, methocarbamol 750 mg tablet  take 1 tablet by mouth nightly if needed for muscle spasm, Disp: , Rfl:   •  metoprolol succinate (TOPROL-XL) 25 mg 24 hr tablet, Toprol XL 25 mg tablet,extended release, Disp: , Rfl:   •  mometasone (ELOCON) 0 1 % lotion, Apply topically as needed (2 drops each ear canal once daily as needed) 2 drops to each ear canal as needed, Disp: 60 mL, Rfl: 3  •  montelukast (SINGULAIR) 10 mg tablet, montelukast 10 mg tablet, Disp: , Rfl:   •  Multiple Vitamins-Minerals (VITAMIN D3 COMPLETE PO), Daily, Disp: , Rfl:   •  nitroglycerin (NITROSTAT) 0 4 mg SL tablet, nitroglycerin 0 4 mg sublingual tablet  PLACE 1 TABLET (0 4 MG) BY SUBLINGUAL ROUTE EVERY 5 MINUTES AS NEEDEDFOR CHEST PAIN  DO NOT EXCEED 3 DOSES IN 15 MINUTES , Disp: , Rfl:   •  OMEGA-3 FATTY ACIDS PO, 360mg, Disp: , Rfl:   •  omeprazole (PriLOSEC) 20 mg delayed release capsule, Take 20 mg by mouth daily, Disp: , Rfl:   •  pramipexole (MIRAPEX) 0 25 mg tablet, Take 1 tablet (0 25 mg total) by mouth daily at bedtime, Disp: 30 tablet, Rfl: 0  •  promethazine-codeine (PHENERGAN WITH CODEINE) 6 25-10 mg/5 mL syrup, , Disp: , Rfl:   •  Repatha SureClick 359 MG/ML SOAJ, , Disp: , Rfl:   •  SODIUM FLUORIDE, DENTAL GEL, 1 1 % GEL, PreviDent 5000 Booster Plus 1 1 % dental paste  BRUSH 1 TIME DAILY PREFERABLY BEFORE BED, DO NOT RINSE AFTER USE, Disp: , Rfl:   •  tadalafil (CIALIS) 5 MG tablet, Take 1 tablet (5 mg total) by mouth in the morning, Disp: 90 tablet, Rfl: 3  •  tiotropium (Spiriva Respimat) 1 25 MCG/ACT AERS inhaler, Spiriva Respimat 1 25 mcg/actuation solution for inhalation  2 puffs daily, Disp: , Rfl:   •  tiotropium (SPIRIVA) 18 mcg inhalation capsule, Place 18 mcg into inhaler and inhale daily, Disp: , Rfl:   •  Zoster Vac Recomb Adjuvanted (Shingrix) 50 MCG/0 5ML SUSR, , Disp: , Rfl:   Allergies   Allergen Reactions   • Sulfa Antibiotics    • Clarithromycin Rash   • Penicillins Hives and Rash       Vitals: Blood pressure 121/57, pulse 76, height 5' 11" (1 803 m), weight 105 kg (232 lb 3 2 oz)  Body mass index is 32 39 kg/m²          Physical Exam  General:  Awake alert and oriented x 3, conversant without conversational dyspnea, NAD, normal affect  HEENT:   Sclera noninjected, nonicteric OU, Nares patent,  no craniofacial abnormalities, Mucous membranes, moist, no oral lesions, normal dentition  NECK:  Trachea midline, no accessory muscle use, no stridor,  JVP not elevated  CARDIAC: Reg, single s1/S2, no m/r/g  PULM: CTA bilaterally no wheezing, rhonchi or rales  ABD: Soft nontender, nondistended, no rebound, no rigidity, no guarding  EXT: No cyanosis, no clubbing, no edema, normal capillary refill  NEURO: no focal neurologic deficits, AAOx3, moving all extremities appropriately    Labs: I have personally reviewed pertinent lab results  , ABG: No results found for: PHART, WEA3YQX, PO2ART, DFR8CSY, K7SADXLS, BEART, SOURCE, BNP: No results found for: BNP, CBC: No results found for: WBC, HGB, HCT, MCV, PLT, ADJUSTEDWBC, MCH, MCHC, RDW, MPV, NRBC, CMP: No results found for: SODIUM, K, CL, CO2, ANIONGAP, BUN, CREATININE, GLUCOSE, CALCIUM, AST, ALT, ALKPHOS, PROT, BILITOT, EGFR, PT/INR: No results found for: PT, INR, Troponin: No results found for: TROPONINI  Lab Results   Component Value Date    WBC 4 63 02/14/2022    HGB 15 5 02/14/2022    HCT 47 1 02/14/2022    MCV 90 02/14/2022     02/14/2022     Lab Results   Component Value Date    CALCIUM 8 9 02/14/2022    K 4 2 02/14/2022    CO2 25 02/14/2022     (H) 02/14/2022    BUN 11 02/14/2022    CREATININE 1 04 02/14/2022     No results found for: IGE  Lab Results   Component Value Date    ALT 26 02/14/2022    AST 20 02/14/2022    ALKPHOS 98 02/14/2022         Sleep studies: I have personally reviewed pertinent reports  PSG with inspire 1/2022  The patient slept for a total 393 minutes representing sleep efficiency of 90%  He had significant reduction of sleep disordered breathing events down from an average AHI of 30 events per hour on the control night study to 1 5 events per hour with titrating the inspire up to 3 4 volts  Thus continuing to use inspire with patient control range of 3 2 to 4 2 volts with an amplitude of 3 4 volts would be recommended  In addition, the patient has increased number of periodic limb movements with a PLM index of 96 9 events per hour    Excessive daytime sleepiness persist after successful treatment of sleep disordered breathing; checking a CBC, BUN, creatinine, TSH, vitamin B12, vitamin-D, magnesium, iron studies including ferritin to rule out underlying metabolic disorders associated with periodic limb movement disorder would be recommended, clinical correlation suggested  HST 8/2022  Mr Shawn Hill demonstrated an increased number of sleep related respiratory events (FANNIE - 25 3) which is consistent with moderate obstructive sleep apnea  The vast majority of these events were obstructive apnea but there were a few central events noted as well (AUGIE - 3 2, mixed apnea 1 5)  I would encourage advancing Inspire therapy as tolerated  HST 1/2023: AHI 8 6 events/hr --> improved compared to baseline 30 events/hr         Compliance:              Rae Garnett MD  [facility]       Portions of the record may have been created with voice recognition software  Occasional wrong word or "sound a like" substitutions may have occurred due to the inherent limitations of voice recognition software  Read the chart carefully and recognize, using context, where substitutions have occurred

## 2023-02-06 NOTE — PATIENT INSTRUCTIONS
Sleep Apnea   AMBULATORY CARE:   Sleep apnea  is a condition that causes you to stop breathing often during sleep  Types of sleep apnea:   Obstructive sleep apnea (MILDRED)  is the most common kind  The muscles and tissues around your throat relax and block air from passing through  Obesity, use of alcohol or cigarettes, or a family history are common causes  MILDRED may increase your risk for complications after surgery  Central sleep apnea (CSA)  means your brain does not send signals to the muscles that control breathing  You do not take a breath even though your airway is open  Common causes include medical conditions such as heart failure, being older than 40, or use of opioids  Complex (or mixed) sleep apnea  means you have both obstructive and central sleep apnea  Common signs and symptoms:   Loud snoring or long pauses in breathing    Feeling sleepy, slow, and tired during the day    Snorting, gasping, or choking while you sleep, and waking up suddenly because of these    Feeling irritable during the day    Dry mouth or a headache in the mornings    Heavy night sweating    A hard time thinking, remembering things, or focusing on your tasks the following day    Call your local emergency number (911 in the [address]) if:   You have chest pain or trouble breathing  Call your doctor if:   You have new or worsening signs or symptoms  You have questions or concerns about your condition or care  Treatment  depends on the kind of apnea you have  A mouth device  may be needed if you have mild sleep apnea  These are designed to keep your throat open  Ask your dentist or healthcare provider about the best mouth device for you  A machine  may be used to help you get more air during sleep  A mask may be placed over your nose and mouth, or just your nose  The mask is hooked to the machine  You will get air through the mask      A continuous positive airway pressure (CPAP) machine  is used to keep your airway open during sleep  The machine blows a gentle stream of air into the mask when you breathe  This helps keep your airway open so you can breathe more regularly  Extra oxygen may be given through the machine  A bilevel positive airway pressure (BiPAP) machine  gives air but lowers the pressure when you breathe out  An adaptive servo-ventilator (ASV)  is a machine that learns your usual breathing pattern  Then, it uses pressure to give you air and prevent stops in your breathing  Surgery  to expand your airway or remove extra tissues may be needed  Surgery is usually only considered if other treatments do not work  Manage or prevent sleep apnea:   Reach and maintain a healthy weight  Ask your healthcare provider what a healthy weight is for you  Ask him or her to help you create a safe weight loss plan if you are overweight  Even a small goal of a 10% weight loss can improve your symptoms  Do not smoke  Nicotine and other chemicals in cigarettes and cigars can cause lung damage  Ask your healthcare provider for information if you currently smoke and need help to quit  E-cigarettes or smokeless tobacco still contain nicotine  Talk to your healthcare provider before you use these products  Do not drink alcohol or take sedative medicine before you go to sleep  Alcohol and sedatives can relax the muscles and tissues around your throat  This can block the airflow to your lungs  Sleep on your side or use pillows designed to prevent sleep apnea  This prevents your tongue or other tissues from blocking your throat  You can also raise the head of your bed  Follow up with your doctor or specialist as directed: You may need to have blood tests during your follow-up visits  Work with your provider to find the right breathing support equipment and settings for you  Write down your questions so you remember to ask them during your visits    © Copyright [employer] 2022 Information is for End User's use only and may not be sold, redistributed or otherwise used for commercial purposes  All illustrations and images included in CareNotes® are the copyrighted property of A D A M [employer]  or Zita Pearce  The above information is an  only  It is not intended as medical advice for individual conditions or treatments  Talk to your doctor, nurse or pharmacist before following any medical regimen to see if it is safe and effective for you

## 2023-02-06 NOTE — ASSESSMENT & PLAN NOTE
• Moderate obstructive sleep apnea diagnosed in 2021 with a baseline AHI of 23 7 events per hour  • His inspire was implanted in September 2021 he was on 2 7-3 7 configuration A +-+  • Had a fine tuning study in January seem to have significant improvement of his sleep apnea at 3 4 v  • Unfortunately, due to intolerance issues he was down only at 2 7 volts and he had a repeat home study recently on that that showed to have high residual AHI  He was switched his own configuration completely 10/2022 to configuration B 0-0, at 1 3 volts he had a significant improvement of his tongue protrusion and he felt way more comfortable in terms of his right-sided neck pain and back pain, however after that he has another visit for persistent neck pain and his pulse width was adjusted to 60/40 with improvement, he underwent another home sleep study afterwards with AHI of 8 6 total    I recommended to Mari to continue advancing we will follow-up in 6 months and if he is able to achieve levels up to 1 7 or 8 V we will obtain another home sleep study, currently was 1 5 V and he tells me that it is comfortable

## 2023-02-10 PROBLEM — Z12.5 PROSTATE CANCER SCREENING: Status: RESOLVED | Noted: 2022-12-12 | Resolved: 2023-02-10

## 2023-08-28 ENCOUNTER — OFFICE VISIT (OUTPATIENT)
Dept: UROLOGY | Facility: CLINIC | Age: 68
End: 2023-08-28
Payer: MEDICARE

## 2023-08-28 VITALS
BODY MASS INDEX: 32.36 KG/M2 | WEIGHT: 232 LBS | SYSTOLIC BLOOD PRESSURE: 126 MMHG | DIASTOLIC BLOOD PRESSURE: 82 MMHG | HEART RATE: 70 BPM

## 2023-08-28 DIAGNOSIS — N52.9 ERECTILE DYSFUNCTION, UNSPECIFIED ERECTILE DYSFUNCTION TYPE: ICD-10-CM

## 2023-08-28 DIAGNOSIS — D22.9 ATYPICAL MOLE: ICD-10-CM

## 2023-08-28 DIAGNOSIS — Z12.5 PROSTATE CANCER SCREENING: Primary | ICD-10-CM

## 2023-08-28 DIAGNOSIS — F52.32 ANORGASMIA OF MALE: ICD-10-CM

## 2023-08-28 PROCEDURE — 99214 OFFICE O/P EST MOD 30 MIN: CPT | Performed by: UROLOGY

## 2023-08-28 RX ORDER — TADALAFIL 5 MG/1
5 TABLET ORAL DAILY
Qty: 90 TABLET | Refills: 3 | Status: SHIPPED | OUTPATIENT
Start: 2023-08-28

## 2023-08-28 NOTE — PROGRESS NOTES
[address]  [facility]  [address] [place] [address]  [phone]  [email or web address]      NAME: Lupe Briseno  AGE: 75 y.o. 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 Also does not ejaculate. The causes of this are multifactorial and this did occur after being hospitalized for COVID. I told him that I have seen many atypical manifestations in the  tract after COVID and we do not have a wide body of knowledge to say there is cause or effect but it certainly seems that way. The treatment of delayed ejaculation and anorgasmia is basically try to get to the root cause which may be medications or behavioral issues. In this situation we do not have either. Hopefully with time it may resolve and he may be full to ejaculate as before. He will continue to perform sexually and take the daily Cialis. I refilled his Cialis with [facility]. If this becomes too expensive we can switch to [facility] [employer]. He will continue taking 5 mg a day. We will also check a testosterone level and a PSA after  when he has his other blood work done with Dr. [place]. Follow-up 1 year or as needed. Chief Complaint   No chief complaint on file. History of Present Illness   60 man, established patient but new to me-last seen by Saúl MENDEZ 2022 for anorgasmia which she thought was possibly due to medications and was on daily Cialis and on-demand Cialis. Also had erectile dysfunction. He saw Dr. Antonio Seek May 24, 2022 and at that time stated that since having COVID in 2020 he had had notable erectile dysfunction. Prior to that he had no issues with erections. He is actually getting to the point where he can start getting an erection firm enough for penetration with the 5 mg of Cialis daily but he cannot ejaculate nor have orgasm. He had a severe case of COVID for which he was hospitalized and has a lifelong history of asthma so he has scar tissue and chronic pneumonitis since having COVID.[id number] voiding complaints. Testosterone was 302 which was normal July 27, 2021. Cancer screening: PSA was 2.85 September 2022. The following portions of the patient's history were reviewed and updated as appropriate: allergies, current medications, past family history, past medical history, past social history, past surgical history and problem list.  Past Medical History:   Diagnosis Date   • Allergic rhinitis    • Arthritis    • Asthma    • COVID-19 12/2020   • Heart attack [facility])    • Hypertensive disorder 7/15/2013   • Myocardial infarction [facility])     2012 / 2018   • Sleep apnea     does not use C-PAP     Past Surgical History:   Procedure Laterality Date   • CORONARY ARTERY BYPASS GRAFT  2012    2 stents. .2018. .stent / 2012 3 vessel bypass   • EXAMINATION UNDER ANESTHESIA N/A 9/24/2020    Procedure: DRUG INDUCED SLEEP ENDOSCOPY;  Surgeon: Geena Burroughs MD;  [facility];  Service: ENT   • JOINT REPLACEMENT     • DC OPEN IMPLANTATION CRANIAL NERVE SCOOBY & PULSE GEN N/A 9/8/2021    Procedure: INSERTION UPPER AIRWAY STIMULATOR, INSPIRE IMPLANT;  Surgeon: Geena Burroughs MD;  Location: [facility];  Service: ENT   • REPLACEMENT TOTAL KNEE BILATERAL     • ROTATOR CUFF REPAIR       shoulder  Review of Systems   Review of Systems   Genitourinary: Negative.         Active Problem List     Patient Active Problem List   Diagnosis   • STEMI (ST elevation myocardial infarction) ([address])   • Obstructive sleep apnea   • Sleep apnea   • Smoking   • History of coronary artery bypass graft   • Status post aorto-coronary artery bypass graft   • Excessive daytime sleepiness   • Obesity   • Allergic rhinitis   • CAD (coronary artery disease), native coronary artery   • Cervical radiculopathy   • Chronic back pain   • Chronic low back pain   • Closed fracture of shoulder   • Disorder of lumbar spine   • Hypertensive disorder   • Macular degeneration   • Mixed hyperlipidemia   • Moderate persistent asthma without complication   • Preop pulmonary/respiratory exam   • Rotator cuff injury   • S/P PTCA (percutaneous transluminal coronary angioplasty)   • PLMD (periodic limb movement disorder)   • Snoring   • Anorgasmia of male   • Erectile dysfunction       Objective   /82   Pulse 70   Wt 105 kg (232 lb)   BMI 32.36 kg/m²     Physical Exam  Vitals reviewed. Constitutional:       Appearance: Normal appearance. HENT:      Head: Normocephalic and atraumatic. Eyes:      Extraocular Movements: Extraocular movements intact. Pulmonary:      Effort: Pulmonary effort is normal.   Abdominal:      General: There is no distension. Palpations: Abdomen is soft. There is no mass. Tenderness: There is no abdominal tenderness. There is no right CVA tenderness, left CVA tenderness, guarding or rebound. Hernia: No hernia is present. Genitourinary:     Penis: Normal.       Testes: Normal.      Prostate: Normal.      Rectum: Normal.      Comments: Normal circumcised phallus, no skin lesions, testicles are descended bilaterally, normal volume, no inguinal hernia, rectal exam reveals normal tone no masses and his prostate is about 40 g, smooth symmetric and benign. No nodules. There is a mole left gluteal region towards the anus with some irregularity and hyperpigmentation. Musculoskeletal:         General: Normal range of motion. Cervical back: Normal range of motion. Skin:     Coloration: Skin is not jaundiced or pale. Comments: See rectal exam as above   Neurological:      General: No focal deficit present.       Mental Status: He is alert and oriented to person, place, and time. Psychiatric:         Mood and Affect: Mood normal.         Behavior: Behavior normal.         Thought Content: Thought content normal.         Judgment: Judgment normal.             Current Medications     Current Outpatient Medications:   •  albuterol (2.5 mg/3 mL) 0.083 % nebulizer solution, inhale contents of 1 vial ( 3 milliliters ) in nebulizer by mouth. ..  (REFER TO PRESCRIPTION NOTES). , Disp: , Rfl:   •  albuterol (PROVENTIL HFA,VENTOLIN HFA) 90 mcg/act inhaler, Inhale 1-2 puffs, Disp: , Rfl:   •  aspirin 81 MG tablet, Take 1 tablet by mouth daily, Disp: , Rfl:   •  benzonatate (TESSALON) 200 MG capsule, Take 200 mg by mouth 3 (three) times a day, Disp: , Rfl:   •  Cholecalciferol 50 MCG (2000 UT) CAPS, Daily, Disp: , Rfl:   •  clopidogrel (PLAVIX) 75 mg tablet, clopidogrel 75 mg tablet, Disp: , Rfl:   •  dexamethasone (DECADRON) 6 mg tablet, dexamethasone 6 mg tablet  TAKE 1 TABLET BY MOUTH TWICE A DAY, Disp: , Rfl:   •  fenofibrate (TRICOR) 54 MG tablet, Take 75 mg by mouth daily, Disp: , Rfl:   •  fluticasone-salmeterol (Advair) 250-50 mcg/dose inhaler, Inhale 1 puff 2 (two) times a day, Disp: , Rfl:   •  Fluticasone-Salmeterol (Advair) 500-50 mcg/dose inhaler, Advair Diskus 500 mcg-50 mcg/dose powder for inhalation, Disp: , Rfl:   •  Influenza Vac A&B SA Adj Quad (Fluad Quadrivalent) 0.5 ML PRSY, , Disp: , Rfl:   •  levothyroxine 75 mcg tablet, , Disp: , Rfl:   •  methocarbamol (ROBAXIN) 750 mg tablet, methocarbamol 750 mg tablet  take 1 tablet by mouth nightly if needed for muscle spasm, Disp: , Rfl:   •  metoprolol succinate (TOPROL-XL) 25 mg 24 hr tablet, Toprol XL 25 mg tablet,extended release, Disp: , Rfl:   •  mometasone (ELOCON) 0.1 % lotion, Apply topically as needed (2 drops each ear canal once daily as needed) 2 drops to each ear canal as needed, Disp: 60 mL, Rfl: 3  •  montelukast (SINGULAIR) 10 mg tablet, montelukast 10 mg tablet, Disp: , Rfl: •  Multiple Vitamins-Minerals (VITAMIN D3 COMPLETE PO), Daily, Disp: , Rfl:   •  nitroglycerin (NITROSTAT) 0.4 mg SL tablet, nitroglycerin 0.4 mg sublingual tablet  PLACE 1 TABLET (0.4 MG) BY SUBLINGUAL ROUTE EVERY 5 MINUTES AS NEEDEDFOR CHEST PAIN.  DO NOT EXCEED 3 DOSES IN 15 MINUTES., Disp: , Rfl:   •  OMEGA-3 FATTY ACIDS PO, 360mg, Disp: , Rfl:   •  omeprazole (PriLOSEC) 20 mg delayed release capsule, Take 20 mg by mouth daily, Disp: , Rfl:   •  pramipexole (MIRAPEX) 0.25 mg tablet, Take 1 tablet (0.25 mg total) by mouth daily at bedtime, Disp: 30 tablet, Rfl: 0  •  Repatha SureClick 390 MG/ML SOAJ, , Disp: , Rfl:   •  SODIUM FLUORIDE, DENTAL GEL, 1.1 % GEL, PreviDent 5000 Booster Plus 1.1 % dental paste  BRUSH 1 TIME DAILY PREFERABLY BEFORE BED, DO NOT RINSE AFTER USE, Disp: , Rfl:   •  tadalafil (CIALIS) 5 MG tablet, Take 1 tablet (5 mg total) by mouth in the morning, Disp: 90 tablet, Rfl: 3  •  tiotropium (SPIRIVA) 18 mcg inhalation capsule, Place 18 mcg into inhaler and inhale daily, Disp: , Rfl:   •  Zoster Vac Recomb Adjuvanted (Shingrix) 50 MCG/0.5ML SUSR, , Disp: , Rfl:   •  promethazine-codeine (PHENERGAN WITH CODEINE) 6.25-10 mg/5 mL syrup, , Disp: , Rfl:   •  tiotropium (Spiriva Respimat) 1.25 MCG/ACT AERS inhaler, Spiriva Respimat 1.25 mcg/actuation solution for inhalation  2 puffs daily, Disp: , Rfl:         Sada Zayas MD

## 2023-09-06 ENCOUNTER — OFFICE VISIT (OUTPATIENT)
Dept: SLEEP CENTER | Facility: CLINIC | Age: 68
End: 2023-09-06
Payer: MEDICARE

## 2023-09-06 VITALS
HEIGHT: 71 IN | BODY MASS INDEX: 33.32 KG/M2 | DIASTOLIC BLOOD PRESSURE: 82 MMHG | WEIGHT: 238 LBS | SYSTOLIC BLOOD PRESSURE: 123 MMHG

## 2023-09-06 DIAGNOSIS — G47.33 OBSTRUCTIVE SLEEP APNEA SYNDROME: Primary | ICD-10-CM

## 2023-09-06 DIAGNOSIS — J45.40 MODERATE PERSISTENT ASTHMA WITHOUT COMPLICATION: ICD-10-CM

## 2023-09-06 DIAGNOSIS — G47.33 OBSTRUCTIVE SLEEP APNEA: ICD-10-CM

## 2023-09-06 DIAGNOSIS — E66.9 OBESITY WITHOUT SERIOUS COMORBIDITY, UNSPECIFIED CLASSIFICATION, UNSPECIFIED OBESITY TYPE: ICD-10-CM

## 2023-09-06 PROCEDURE — 99214 OFFICE O/P EST MOD 30 MIN: CPT | Performed by: INTERNAL MEDICINE

## 2023-09-06 PROCEDURE — 95976 ALYS SMPL CN NPGT PRGRMG: CPT | Performed by: INTERNAL MEDICINE

## 2023-09-06 RX ORDER — CARISOPRODOL 350 MG/1
TABLET ORAL
COMMUNITY
Start: 2023-09-05

## 2023-09-06 NOTE — PATIENT INSTRUCTIONS
Sleep Apnea   AMBULATORY CARE:   Sleep apnea  is a condition that causes you to stop breathing often during sleep. Types of sleep apnea:   Obstructive sleep apnea (MILDRED)  is the most common kind. The muscles and tissues around your throat relax and block air from passing through. Obesity, use of alcohol or cigarettes, or a family history are common causes. MILDRED may increase your risk for complications after surgery. Central sleep apnea (CSA)  means your brain does not send signals to the muscles that control breathing. You do not take a breath even though your airway is open. Common causes include medical conditions such as heart failure, being older than 40, or use of opioids. Complex (or mixed) sleep apnea  means you have both obstructive and central sleep apnea. Common signs and symptoms:   Loud snoring or long pauses in breathing    Feeling sleepy, slow, and tired during the day    Snorting, gasping, or choking while you sleep, and waking up suddenly because of these    Feeling irritable during the day    Dry mouth or a headache in the mornings    Heavy night sweating    A hard time thinking, remembering things, or focusing on your tasks the following day    Call your local emergency number (911 in the [address]) if:   You have chest pain or trouble breathing. Call your doctor if:   You have new or worsening signs or symptoms. You have questions or concerns about your condition or care. Treatment  depends on the kind of apnea you have. A mouth device  may be needed if you have mild sleep apnea. These are designed to keep your throat open. Ask your dentist or healthcare provider about the best mouth device for you. A machine  may be used to help you get more air during sleep. A mask may be placed over your nose and mouth, or just your nose. The mask is hooked to the machine. You will get air through the mask.     A continuous positive airway pressure (CPAP) machine  is used to keep your airway open during sleep. The machine blows a gentle stream of air into the mask when you breathe. This helps keep your airway open so you can breathe more regularly. Extra oxygen may be given through the machine. A bilevel positive airway pressure (BiPAP) machine  gives air but lowers the pressure when you breathe out. An adaptive servo-ventilator (ASV)  is a machine that learns your usual breathing pattern. Then, it uses pressure to give you air and prevent stops in your breathing. Surgery  to expand your airway or remove extra tissues may be needed. Surgery is usually only considered if other treatments do not work. Manage or prevent sleep apnea:   Reach and maintain a healthy weight. Ask your healthcare provider what a healthy weight is for you. Ask your provider to help you create a safe weight loss plan if you are overweight. Even a small goal of a 10% weight loss can improve your symptoms. Do not smoke. Nicotine and other chemicals in cigarettes and cigars can cause lung damage. Ask your healthcare provider for information if you currently smoke and need help to quit. E-cigarettes or smokeless tobacco still contain nicotine. Talk to your healthcare provider before you use these products. Do not drink alcohol or take sedative medicine before you go to sleep. Alcohol and sedatives can relax the muscles and tissues around your throat. This can block the airflow to your lungs. Sleep on your side or use pillows designed to prevent sleep apnea. This prevents your tongue or other tissues from blocking your throat. You can also raise the head of your bed. Follow up with your doctor or specialist as directed: You may need to have blood tests during your follow-up visits. Work with your provider to find the right breathing support equipment and settings for you. Write down your questions so you remember to ask them during your visits.   © Copyright [employer] 2022 Information is for End User's use only and may not be sold, redistributed or otherwise used for commercial purposes. The above information is an  only. It is not intended as medical advice for individual conditions or treatments. Talk to your doctor, nurse or pharmacist before following any medical regimen to see if it is safe and effective for you.

## 2023-09-06 NOTE — PROGRESS NOTES
Pulmonary/Sleep Follow Up Note   Maurilio Bazan 75 y.o. male MRN: [id number]  9/6/2023      Assessment and Plan:    1. Obstructive sleep apnea syndrome    2. Obstructive sleep apnea  Assessment & Plan: Moderate baseline AHI 23  Improved with Inspire  Still have issues when he wakes up in the middle of the night instructed to use the pause function more often    Inspire Settings  Incoming Settings  Electrode configuration A + - +   Amplitude (V) 1.6  Range (V) 1.2 to 2.2  Pulse width (us) 60  Rate (Hz) 40  Start delay (min) 50  Pause Time (min) 15  Therapy Duration (hr) 12        Inspire Settings  Outgoing Settings  Electrode configuration A + - +   Amplitude (V) 1.6  Range (V) 1.2 to 2.2  Pulse width (us) 60  Rate (Hz) 40  Start delay (min) 50  Pause Time (min) 20  Therapy Duration (hr) 8    Decreased the max stim. time to 3 seconds from 4 seconds     I reminded the patient in details the pathophysiology of obstructive sleep apnea. I also reminded him of the importance of treatment, and the consequences of untreated obstructive sleep apnea on underlying cardiovascular and cerebrovascular risk, morbidity, and mortality. 3. Moderate persistent asthma without complication  Assessment & Plan:  Not in exacerbation, stable on Advair and Albuterol       4. Obesity without serious comorbidity, unspecified classification, unspecified obesity type  Assessment & Plan:  BMI 33.19  Noted, would benefit from weight loss         Return in about 6 months (around 3/6/2024). History of Present Illness   HPI:  Maurilio Bazan is a 75 y.o. male who is here for follow-up appointment the patient has history of severe MILDRED with an AHI of 30 events per hour underwent inspire/hypoglossal nerve stimulation activation he underwent a fine tuning study 3 months afterwards with significant improvement of his sleep disordered breathing events down to a 2 events per hour.       Interval history:  The patient started to experience intolerance issues he mainly describes it out right-sided neck pain and sometimes associated with back pain neck pain for that he started to go down his inspire voltage to 2.6-2.7 and he remained that and even with that he was not compliant using it or turning it on every night he underwent a home sleep study recently that seem to have high residual AHI as detailed below. Adjustment of the configuration was done a subsequent visit with some improvement however still had persistent right-sided neck tensioning pain then it was readjusted as detailed below with improvement     Adjustments of the pulse width rate has been done on the following visit with improvement of the neck tolerance, he has been compliant using the inspire    Still having difficulty falling back asleep if he doesn't pause his device when he wakes up in the middle of the night       Sitting and reading: Slight chance of dozing  Watching TV: Slight chance of dozing  Sitting, inactive in a public place (e.g. a theatre or a meeting): Slight chance of dozing  As a passenger in a car for an hour without a break: Would never doze  Lying down to rest in the afternoon when circumstances permit: Would never doze  Sitting and talking to someone: Would never doze  Sitting quietly after a lunch without alcohol: Would never doze  In a car, while stopped for a few minutes in traffic: Would never doze  Total score: 3      Historical Information   Past Medical History:   Diagnosis Date   • Allergic rhinitis    • Arthritis    • Asthma    • COVID-19 12/2020   • Heart attack [facility])    • Hypertensive disorder 7/15/2013   • Myocardial infarction [facility])     2012 / 2018   • Sleep apnea     does not use C-PAP     Past Surgical History:   Procedure Laterality Date   • CORONARY ARTERY BYPASS GRAFT  2012    2 stents. .2018. .stent / 2012 3 vessel bypass   • EXAMINATION UNDER ANESTHESIA N/A 9/24/2020    Procedure: DRUG INDUCED SLEEP ENDOSCOPY;  Surgeon: Yoselin Gil MD; Location: [facility];  Service: ENT   • JOINT REPLACEMENT     • [place] OPEN IMPLANTATION CRANIAL NERVE SCOOBY & PULSE GEN N/A 9/8/2021    Procedure: INSERTION UPPER AIRWAY STIMULATOR, INSPIRE IMPLANT;  Surgeon: Kaleb Gordon MD;  Location: [facility];  Service: ENT   • REPLACEMENT TOTAL KNEE BILATERAL     • ROTATOR CUFF REPAIR       Family History   Problem Relation Age of Onset   • Cancer Brother    • Melanoma Brother          Meds/Allergies     Current Outpatient Medications:   •  albuterol (2.5 mg/3 mL) 0.083 % nebulizer solution, inhale contents of 1 vial ( 3 milliliters ) in nebulizer by mouth. ..  (REFER TO PRESCRIPTION NOTES). , Disp: , Rfl:   •  albuterol (PROVENTIL HFA,VENTOLIN HFA) 90 mcg/act inhaler, Inhale 1-2 puffs, Disp: , Rfl:   •  aspirin 81 MG tablet, Take 1 tablet by mouth daily, Disp: , Rfl:   •  carisoprodol (SOMA) 350 mg tablet, , Disp: , Rfl:   •  Cholecalciferol 50 MCG (2000 UT) CAPS, Daily, Disp: , Rfl:   •  clopidogrel (PLAVIX) 75 mg tablet, clopidogrel 75 mg tablet, Disp: , Rfl:   •  fenofibrate (TRICOR) 54 MG tablet, Take 75 mg by mouth daily, Disp: , Rfl:   •  fluticasone-salmeterol (Advair) 250-50 mcg/dose inhaler, Inhale 1 puff 2 (two) times a day, Disp: , Rfl:   •  Fluticasone-Salmeterol (Advair) 500-50 mcg/dose inhaler, Advair Diskus 500 mcg-50 mcg/dose powder for inhalation, Disp: , Rfl:   •  Influenza Vac A&B SA Adj Quad (Fluad Quadrivalent) 0.5 ML PRSY, , Disp: , Rfl:   •  levothyroxine 75 mcg tablet, , Disp: , Rfl:   •  metoprolol succinate (TOPROL-XL) 25 mg 24 hr tablet, Toprol XL 25 mg tablet,extended release, Disp: , Rfl:   •  mometasone (ELOCON) 0.1 % lotion, Apply topically as needed (2 drops each ear canal once daily as needed) 2 drops to each ear canal as needed, Disp: 60 mL, Rfl: 3  •  montelukast (SINGULAIR) 10 mg tablet, montelukast 10 mg tablet, Disp: , Rfl:   •  Multiple Vitamins-Minerals (VITAMIN D3 COMPLETE PO), Daily, Disp: , Rfl:   •  nitroglycerin (NITROSTAT) 0.4 mg SL tablet, nitroglycerin 0.4 mg sublingual tablet  PLACE 1 TABLET (0.4 MG) BY SUBLINGUAL ROUTE EVERY 5 MINUTES AS NEEDEDFOR CHEST PAIN. DO NOT EXCEED 3 DOSES IN 15 MINUTES., Disp: , Rfl:   •  OMEGA-3 FATTY ACIDS PO, 360mg, Disp: , Rfl:   •  omeprazole (PriLOSEC) 20 mg delayed release capsule, Take 20 mg by mouth daily, Disp: , Rfl:   •  Repatha SureClick 455 MG/ML SOAJ, , Disp: , Rfl:   •  SODIUM FLUORIDE, DENTAL GEL, 1.1 % GEL, PreviDent 5000 Booster Plus 1.1 % dental paste  BRUSH 1 TIME DAILY PREFERABLY BEFORE BED, DO NOT RINSE AFTER USE, Disp: , Rfl:   •  tadalafil (CIALIS) 5 MG tablet, Take 1 tablet (5 mg total) by mouth in the morning, Disp: 90 tablet, Rfl: 3  •  tiotropium (Spiriva Respimat) 1.25 MCG/ACT AERS inhaler, Spiriva Respimat 1.25 mcg/actuation solution for inhalation  2 puffs daily, Disp: , Rfl:   •  tiotropium (SPIRIVA) 18 mcg inhalation capsule, Place 18 mcg into inhaler and inhale daily, Disp: , Rfl:   •  Zoster Vac Recomb Adjuvanted (Shingrix) 50 MCG/0.5ML SUSR, , Disp: , Rfl:   •  benzonatate (TESSALON) 200 MG capsule, Take 200 mg by mouth 3 (three) times a day, Disp: , Rfl:   •  dexamethasone (DECADRON) 6 mg tablet, dexamethasone 6 mg tablet  TAKE 1 TABLET BY MOUTH TWICE A DAY, Disp: , Rfl:   •  methocarbamol (ROBAXIN) 750 mg tablet, methocarbamol 750 mg tablet  take 1 tablet by mouth nightly if needed for muscle spasm, Disp: , Rfl:   •  pramipexole (MIRAPEX) 0.25 mg tablet, Take 1 tablet (0.25 mg total) by mouth daily at bedtime, Disp: 30 tablet, Rfl: 0  •  promethazine-codeine (PHENERGAN WITH CODEINE) 6.25-10 mg/5 mL syrup, , Disp: , Rfl:   Allergies   Allergen Reactions   • Sulfa Antibiotics    • Clarithromycin Rash   • Penicillins Hives and Rash       Vitals: Blood pressure 123/82, height 5' 11" (1.803 m), weight 108 kg (238 lb). Body mass index is 33.19 kg/m².         Physical Exam  General:  Awake alert and oriented x 3, conversant without conversational dyspnea, NAD, normal affect  HEENT: Sclera noninjected, nonicteric OU, Nares patent,  no craniofacial abnormalities, Mucous membranes, moist, no oral lesions, normal dentition  NECK:  Trachea midline, no accessory muscle use, no stridor,  JVP not elevated  CARDIAC: Reg, single s1/S2, no m/r/g  PULM: CTA bilaterally no wheezing, rhonchi or rales  ABD: Soft nontender, nondistended, no rebound, no rigidity, no guarding  EXT: No cyanosis, no clubbing, no edema, normal capillary refill  NEURO: no focal neurologic deficits, AAOx3, moving all extremities appropriately    Labs: I have personally reviewed pertinent lab results. , ABG: No results found for: "PHART", "CEE2ZCB", "PO2ART", "PEZ4FLQ", "S3BLKYYX", "BEART", "SOURCE", BNP: No results found for: "BNP", CBC: No results found for: "WBC", "HGB", "HCT", "MCV", "PLT", "ADJUSTEDWBC", "RBC", "MCH", "MCHC", "RDW", "MPV", "NRBC", CMP: No results found for: "SODIUM", "K", "CL", "CO2", "ANIONGAP", "BUN", "CREATININE", "GLUCOSE", "CALCIUM", "AST", "ALT", "ALKPHOS", "PROT", "BILITOT", "EGFR", PT/INR: No results found for: "PT", "INR", Troponin: No results found for: "TROPONINI"  Lab Results   Component Value Date    WBC 4.63 02/14/2022    HGB 15.5 02/14/2022    HCT 47.1 02/14/2022    MCV 90 02/14/2022     02/14/2022     Lab Results   Component Value Date    CALCIUM 8.9 02/14/2022    K 4.2 02/14/2022    CO2 25 02/14/2022     (H) 02/14/2022    BUN 11 02/14/2022    CREATININE 1.04 02/14/2022     No results found for: "IGE"  Lab Results   Component Value Date    ALT 26 02/14/2022    AST 20 02/14/2022    ALKPHOS 98 02/14/2022         Sleep studies: I have personally reviewed pertinent reports. PSG with inspire 1/2022  The patient slept for a total 393 minutes representing sleep efficiency of 90%. He had significant reduction of sleep disordered breathing events down from an average AHI of 30 events per hour on the control night study to 1.5 events per hour with titrating the inspire up to 3.4 volts. Thus continuing to use inspire with patient control range of 3.2 to 4.2 volts with an amplitude of 3.4 volts would be recommended. In addition, the patient has increased number of periodic limb movements with a PLM index of 96.9 events per hour. Excessive daytime sleepiness persist after successful treatment of sleep disordered breathing; checking a CBC, BUN, creatinine, TSH, vitamin B12, vitamin-D, magnesium, iron studies including ferritin to rule out underlying metabolic disorders associated with periodic limb movement disorder would be recommended, clinical correlation suggested. HST 8/2022  Mr. Issac Robin demonstrated an increased number of sleep related respiratory events (FANNIE - 25.3) which is consistent with moderate obstructive sleep apnea. The vast majority of these events were obstructive apnea but there were a few central events noted as well (AUGIE - 3.2, mixed apnea 1.5). I would encourage advancing Inspire therapy as tolerated. HST 1/2023: AHI 8.6 events/hr --> improved compared to baseline 30 events/hr         Compliance:  32 hrs of use last 7 days            Sonja Harvey MD  [facility]       Portions of the record may have been created with voice recognition software. Occasional wrong word or "sound a like" substitutions may have occurred due to the inherent limitations of voice recognition software. Read the chart carefully and recognize, using context, where substitutions have occurred.

## 2023-09-06 NOTE — ASSESSMENT & PLAN NOTE
Moderate baseline AHI 23  Improved with Inspire  Still have issues when he wakes up in the middle of the night instructed to use the pause function more often    Inspire Settings  Incoming Settings  Electrode configuration A + - +   Amplitude (V) 1.6  Range (V) 1.2 to 2.2  Pulse width (us) 60  Rate (Hz) 40  Start delay (min) 50  Pause Time (min) 15  Therapy Duration (hr) 12        Inspire Settings  Outgoing Settings  Electrode configuration A + - +   Amplitude (V) 1.6  Range (V) 1.2 to 2.2  Pulse width (us) 60  Rate (Hz) 40  Start delay (min) 50  Pause Time (min) 20  Therapy Duration (hr) 8    Decreased the max stim. time to 3 seconds from 4 seconds     I reminded the patient in details the pathophysiology of obstructive sleep apnea. I also reminded him of the importance of treatment, and the consequences of untreated obstructive sleep apnea on underlying cardiovascular and cerebrovascular risk, morbidity, and mortality.

## 2024-02-22 ENCOUNTER — OFFICE VISIT (OUTPATIENT)
Dept: SLEEP CENTER | Facility: CLINIC | Age: 69
End: 2024-02-22
Payer: MEDICARE

## 2024-02-22 VITALS
WEIGHT: 223 LBS | HEIGHT: 71 IN | DIASTOLIC BLOOD PRESSURE: 82 MMHG | SYSTOLIC BLOOD PRESSURE: 128 MMHG | BODY MASS INDEX: 31.22 KG/M2

## 2024-02-22 DIAGNOSIS — G47.33 OSA (OBSTRUCTIVE SLEEP APNEA): Primary | ICD-10-CM

## 2024-02-22 DIAGNOSIS — Z96.82 S/P INSERTION OF HYPOGLOSSAL NERVE STIMULATOR: ICD-10-CM

## 2024-02-22 PROCEDURE — 95977 ALYS CPLX CN NPGT PRGRMG: CPT | Performed by: PSYCHIATRY & NEUROLOGY

## 2024-02-22 PROCEDURE — 99213 OFFICE O/P EST LOW 20 MIN: CPT | Performed by: PSYCHIATRY & NEUROLOGY

## 2024-02-22 RX ORDER — LEVOTHYROXINE SODIUM 0.1 MG/1
100 TABLET ORAL
COMMUNITY
Start: 2024-02-19

## 2024-02-22 RX ORDER — GABAPENTIN 300 MG/1
CAPSULE ORAL
COMMUNITY
Start: 2024-02-20

## 2024-02-22 NOTE — PATIENT INSTRUCTIONS
I reprogrammed your Inspire  device today.  You are going home at level 2. If this level 2 is comfortable and you comfortably are using Inspire, in 2 weeks you can increase to level 3.  If level 3  setting is effective and comfortable stay there, do not increase.  If this is comfortable and not effective (sleepy in the day or snoring) after 2 weeks on level 3, you can increase to level 4.  Do not increase to level 4 if lower levels are effective or if lower levels are uncomfortable    I would like you to have a sleep study in about 2 months, and follow-up after.  Make sure you use Inspire that night.  If will see you back though before this planned test    Please remember you must turn off (White button) or pause the Inspire device anytime you are eating, drinking, chewing, or swallowing as there is a risk of choking if you do so when the Inspire device is active.     Please bring your remote to each visit with me    If you have device questions in the middle of the night, please call [employer] support  ([phone])     For problems using Inspire, sleep issues, etc, please contact me in the office in the day        It is very important to avoid driving while drowsy, this can be very dangerous or even cause serious injury or death.  If sleepy, it is not safe to get behind the wheel.  If you are driving and feels sleepy, it is very important to pull over right away.  Even losing control of the car for a split second can be deadly.  If you feel you cannot control when sleepiness occurs and cannot prevented, it is important to not drive at all until this improves.  Please let me know if you experience this as it is very important.       Nursing Support:  When: Monday through Friday 7A-5PM except holidays  Where: Our direct line is [phone].    If you are having a true emergency please call 911.  In the event that the line is busy or it is after hours please leave a voice message and we will return your call.  Please  speak clearly, leaving your full name, birth date, best number to reach you and the reason for your call.   Medication refills: We will need the name of the medication, the dosage, the ordering provider, whether you get a 30 or 90 day refill, and the pharmacy name and address.  Medications will be ordered by the provider only.  Nurses cannot call in prescriptions.  Please allow 7 days for medication refills.  Physician requested updates: If your provider requested that you call with an update after starting medication, please be ready to provide us the medication and dosage, what time you take your medication, the time you attempt to fall asleep, time you fall asleep, when you wake up, and what time you get out of bed.  Sleep Study Results: We will contact you with sleep study results and/or next steps after the physician has reviewed your testing.

## 2024-02-22 NOTE — PROGRESS NOTES
Assessment/Plan:    1. MILDRED (obstructive sleep apnea)    2. S/P insertion of hypoglossal nerve stimulator        Mr. Becker has a very complicated case which I reviewed today.  He describes discomfort from in the inspire device but interestingly, did not have tongue protrusion at his incoming setting.  It is noted that he had discomfort at many other inspire settings in the past.  As configuration A in the past was not effective and not tolerated, I decided to focus on comfort settings for configuration B.  I was able to identify a few pulse width/rate combinations that seemed to produce good tongue protrusion and were well-tolerated.  I changed the setting to reflect these today.  He was asking several times about increasing his settings.  I tried to emphasize that he should be conservative in  increasing the levels as discomfort has been a big problem for him in the past limiting adherence.  I advised that he can increase his levels every 2 weeks if his settings are not effective.  He should contact me if the settings are not tolerated or limit usage of the device.  I increased the pause time to allow for better adherence.    I would like to have him follow-up in 1 month to see how he has acclimated to the levels and verify that he is consistently using the inspire device.  If he is doing well, then he would need a repeat test, possibly a home sleep test to verify effectiveness.  It is noted that on his last home sleep test, the respiratory event index was 8.6.  If a similar result was obtained, that would likely be sufficient as that would meet the goals of the inspire device and if we are too aggressive, it seems that may limit his adherence to this therapy.    He mentioned that he has restless leg syndrome, of note he is prescribed gabapentin which is typically first-line for this.  I did not repeat a ferritin today as his last level was very normal in the past.    Inspire Settings  Outgoing Settings  Electrode  configuration B off minus off  Amplitude (V) 1.1  Range (V) 1.0 to 1.5  Pulse width (us)/ Rate (Hz)  120/40  Start delay (min) 40  Pause Time (min) 30  Therapy Duration (hr) 8   Subjective:      Patient ID: Jose Becker is a 68 y.o. male.    HPI    This is a 68-year-old male who returns in a follow-up visit.  He has a history of moderate obstructive sleep apnea, AHI 23.7 diagnosed in 2021.    His device was first activated in October 2021 at 0.8 V on configuration B, off minus off.  At a follow-up visit with Dr. Darling, it seems that patient was changed to electrode configuration a at the range of 2 to 3 V.  At a follow-up visit with Dr. Deshpande, the device was further titrated on configuration A. He had an inspire fine-tune sleep study in January 2022-that test showed an effective result with an AHI of 2 at the setting of 3.2 to 3.4 V on electrode configuration A..  In a follow-up visit after the test, he was maintained on the current settings.  May 2022-notes describe some issues with tongue movement and discomfort, was at 2.8 V.  Sleep test in May 2022 showed a respiratory event index of 25.3, there were 32 central/mixed apneas out of 174 events.  Follow-up after that test, it was discussed that he had intolerance of higher voltage settings on configuration a and the patient was changed to configuration B at 1.3 V.  The patient had some symptoms of right-sided neck pain and back pain which she felt was attributable to the inspire device prior to this change.  December 2022-due to discomfort, pulse width and rate was changed to 60/40 figuration B.  A subsequent home sleep test in January 2023 showed a respiratory event index of 8.6, events much worse in the supine position, normal oxygenation.  Was at 1.3 volts?    At a follow-up visit after that test, it was recommended that he continue to advance his levels with a goal of 1.7 to 1.8 V, currently he was at 1.5 V.  His last visit with Dr. Deshpande, he was at 1.6 V,  "notes describe he still had awakenings in the middle of the night, requiring use of the pause function.      He goes to bed at 9 PM., wakes at 2-3 AM. He pauses the device and finds he cannot fall back asleep. It can take him 30-45 min to return to sleep.  Wakes 7 am     Not refreshed when he wakes.    Feels sleepy in the day  Does not nap  Dozes a few times a week    Wife is in the room with her, he is not told he snores    Benefit on Inspire- feels more rested [but not where he wants to be]    Alcohol- none  Coffee-2 cups in the am    Has a feeling he has to move his legs or ankles.  Not sure if it keeps him up; previous ferritin 186 ng/ml    Inspire Settings  Incoming Settings  Electrode configuration B off minus off  Amplitude (V)   Range (V) 1.2 to 2.2  Pulse width (us)/ Rate (Hz)  60/40  Start delay (min) 50  Pause Time (min) 20  Therapy Duration (hr) 8    Inspire Usage Data  Used 0 of   30 days      [place] Sleepiness Scale  Sitting and reading: Would never doze  Watching TV: Would never doze  Sitting, inactive in a public place (e.g. a theatre or a meeting): Would never doze  As a passenger in a car for an hour without a break: Would never doze  Lying down to rest in the afternoon when circumstances permit: Would never doze  Sitting and talking to someone: Would never doze  Sitting quietly after a lunch without alcohol: Would never doze  In a car, while stopped for a few minutes in traffic: Would never doze  Total score: 0      Review of Systems    As above   Objective:      /82 (BP Location: Left arm, Patient Position: Sitting, Cuff Size: Large)   Ht 5' 11\" (1.803 m)   Wt 101 kg (223 lb)   BMI 31.10 kg/m²          Physical Exam    Tongue is midline, has full ROM     Configuration B:  120/33   0.8 v- no response  1.0 v- mild protrusion, comfortable  1.1 v- mild protrusion, comfortable  1.3 v- comfortable protrusion    120/40 v -   1.0 v minimal protrusion  1.1 v comfortable protrusion  1.2, 1.3 v " comfortable protrusion     He has good tongue protrusion and full range of motion of his tongue.  There is no dysarthria or facial muscle weakness   No

## 2024-03-21 ENCOUNTER — OFFICE VISIT (OUTPATIENT)
Dept: SLEEP CENTER | Facility: CLINIC | Age: 69
End: 2024-03-21
Payer: MEDICARE

## 2024-03-21 VITALS
BODY MASS INDEX: 31.08 KG/M2 | SYSTOLIC BLOOD PRESSURE: 127 MMHG | HEART RATE: 72 BPM | DIASTOLIC BLOOD PRESSURE: 77 MMHG | HEIGHT: 71 IN | WEIGHT: 222 LBS

## 2024-03-21 DIAGNOSIS — Z96.82 S/P INSERTION OF HYPOGLOSSAL NERVE STIMULATOR: ICD-10-CM

## 2024-03-21 DIAGNOSIS — G47.33 OSA (OBSTRUCTIVE SLEEP APNEA): Primary | ICD-10-CM

## 2024-03-21 PROCEDURE — 95977 ALYS CPLX CN NPGT PRGRMG: CPT | Performed by: PSYCHIATRY & NEUROLOGY

## 2024-03-21 PROCEDURE — 99213 OFFICE O/P EST LOW 20 MIN: CPT | Performed by: PSYCHIATRY & NEUROLOGY

## 2024-03-21 RX ORDER — OMEPRAZOLE 20 MG/1
CAPSULE, DELAYED RELEASE ORAL
COMMUNITY
Start: 2024-03-18

## 2024-03-21 NOTE — PATIENT INSTRUCTIONS
Each two week on Thursday, increase the Inspire remote setting by one level. You are on Level 3 today  If you experience discomfort and this disturbs her sleep or prevents an increase in the levels, please let me know.  If this occurs, a follow-up visit may be needed to change some comfort settings with the inspire device.    Please remember you must turn off (White button) or pause the Inspire device anytime you are eating, drinking, chewing, or swallowing as there is a risk of choking if you do so when the Inspire device is active.     Please bring your remote to each visit with me    If you have device questions in the middle of the night, please call Inspire support  ([phone])     For problems using Inspire, sleep issues, etc, please contact me in the office in the day or send a message via [employer]

## 2024-03-21 NOTE — Clinical Note
Hello, he continues to have discomfort in his neck with a pulling/zapping sensation at most configurations, I asked him to follow-up with you to see if you have any further thoughts on this.  Thanks

## 2024-03-21 NOTE — PROGRESS NOTES
Assessment/Plan:    1. MILDRED (obstructive sleep apnea)    2. S/P insertion of hypoglossal nerve stimulator    Overall, Jose continues to struggle with comfort issues related to the inspire device.  He has tried several pulse width and rate combinations on configuration B but continues to describe a pulling/zapping sensation in his neck.  Therefore, I would like to reexplore configuration a with different pulse width and rate combinations.  He seemed to do the best with the setting of 120/33 at 2.2 V.  We discussed that he should increase his level by 0.1 V every 2 weeks, as tolerated.    As he continues to experience this discomfort sensation in his neck, I asked him to again follow back up with Dr. Darling for further assessment, as he has not seen him since 2022.    We discussed that while he thought he was pausing the device, in fact he was not doing so, he did not realize he needed to bring the remote up to his chest to pause the inspire device.  I instructed him on how to do this today.    Inspire Settings  Outgoing Settings  Electrode configuration A + - +   Amplitude (V) 2.2  Range (V) 2.0 to 2.5  Pulse width (us)/ Rate (Hz)  120/33  Start delay (min) 40  Pause Time (min) 30  Therapy Duration (hr) 8       Subjective:      Patient ID: Jose Becker is a 68 y.o. male.    HPI  This is a 68-year-old male with a history of obstructive sleep apnea who returns in a follow-up visit.  I last saw him 1 month ago.  He had discomfort at his incoming settings in the last visit, pulse width and rate were changed.    Prior sleep history-  He has a history of moderate obstructive sleep apnea, AHI 23.7 diagnosed in 2021.    His device was first activated in October 2021 at 0.8 V on configuration B, off minus off.  At a follow-up visit with Dr. Darling, it seems that patient was changed to electrode configuration a at the range of 2 to 3 V.  At a follow-up visit with Dr. Deshpande, the device was further titrated on configuration A.  "He had an inspire fine-tune sleep study in January 2022-that test showed an effective result with an AHI of 2 at the setting of 3.2 to 3.4 V on electrode configuration A..  In a follow-up visit after the test, he was maintained on the current settings.  May 2022-notes describe some issues with tongue movement and discomfort, was at 2.8 V.  Sleep test in May 2022 showed a respiratory event index of 25.3, there were 32 central/mixed apneas out of 174 events.  Follow-up after that test, it was discussed that he had intolerance of higher voltage settings on configuration a and the patient was changed to configuration B at 1.3 V.  The patient had some symptoms of right-sided neck pain and back pain which she felt was attributable to the inspire device prior to this change.  December 2022-due to discomfort, pulse width and rate was changed to 60/40 configuration B.  A subsequent home sleep test in January 2023 showed a respiratory event index of 8.6, events much worse in the supine position, normal oxygenation.  Was at 1.3 volts?  At his visit in February 2024, he was adjusted to 1.1 V with a pulse width/rate combination of 120/40.        Current sleep history  He has been going to bed 9 to 10 PM, asleep in 15 minutes, sleeps 8 hours a night.Up at 8-9 am      Wakes at 3 AM  with a sensation from the device \"like it increases\".  He thinks he pauses the device (though it seems he is turning it off)     He skips night of use \"I am tired of being awake\"    Benefit on Inspire- not as tired     Inspire Settings  Incoming Settings  Electrode configuration A + - +   Amplitude (V) 1.2  Range (V) 1.0 to 1.5  Pulse width (us)/ Rate (Hz)  120/40  Start delay (min) 40  Pause Time (min) 30  Therapy Duration (hr) 8    Inspire Usage Data  Used 13 of 28 days, average session 5.4 hr  0 pauses per night        [place] Sleepiness Scale  Sitting and reading: Would never doze  Watching TV: Would never doze  Sitting, inactive in a public place " "(e.g. a theatre or a meeting): Would never doze  As a passenger in a car for an hour without a break: Would never doze  Lying down to rest in the afternoon when circumstances permit: Would never doze  Sitting and talking to someone: Would never doze  Sitting quietly after a lunch without alcohol: Would never doze  In a car, while stopped for a few minutes in traffic: Would never doze  Total score: 0      Review of Systems      Objective:      /77 (BP Location: Left arm, Patient Position: Sitting, Cuff Size: Large)   Pulse 72   Ht 5' 11\" (1.803 m)   Wt 101 kg (222 lb)   BMI 30.96 kg/m²          Physical Exam    Tongue midline has full rom  Incoming, configuration B, 1.2 v- excellent protrusion,  he feels a pulling in his neck / zapping feeling     Config A 90/33  2.0 v- minimal protrusion  2.2 v- slight protrusion  2.5 v- protrusion \"It doenst feel good\"    120/33  1.6, 1.9 - minimal protrusion  2.2 v- protrusion \"was nice, not like the other one\"    2.3- discomfort     120/40  2.2 v- better protrusion than 2.0 v - had neck discomfort  2.0- minimal protrusion- comfortable    150/33  2.0 v- protrusion, comfortable  2.2 v- better protrusion-  \"alright but starting  to feel something\"      60/40  2.2 v- minimal protrusion  2.5 v- better protrusion   2.7 good protrusion- , felt this in his neck again     Config c 1.2 v - tongue curled and turned to the right   "

## 2024-04-25 ENCOUNTER — OFFICE VISIT (OUTPATIENT)
Dept: SLEEP CENTER | Facility: CLINIC | Age: 69
End: 2024-04-25
Payer: MEDICARE

## 2024-04-25 VITALS
SYSTOLIC BLOOD PRESSURE: 119 MMHG | WEIGHT: 226 LBS | DIASTOLIC BLOOD PRESSURE: 79 MMHG | BODY MASS INDEX: 31.64 KG/M2 | HEIGHT: 71 IN

## 2024-04-25 DIAGNOSIS — Z78.9 INTOLERANCE OF CONTINUOUS POSITIVE AIRWAY PRESSURE (CPAP) VENTILATION: ICD-10-CM

## 2024-04-25 DIAGNOSIS — G47.33 OSA (OBSTRUCTIVE SLEEP APNEA): Primary | ICD-10-CM

## 2024-04-25 DIAGNOSIS — Z96.82 S/P INSERTION OF HYPOGLOSSAL NERVE STIMULATOR: ICD-10-CM

## 2024-04-25 PROCEDURE — 95976 ALYS SMPL CN NPGT PRGRMG: CPT | Performed by: PSYCHIATRY & NEUROLOGY

## 2024-04-25 PROCEDURE — 99213 OFFICE O/P EST LOW 20 MIN: CPT | Performed by: PSYCHIATRY & NEUROLOGY

## 2024-04-25 NOTE — PROGRESS NOTES
Progress Note - Sleep Medicine  Jose Becker 68 y.o. male MRN: [id number]       Impression & Plan:   Patient is a 68 y.o. male with PMH including CAD with h/o STEMI, s/p CABG, moderate persistent asthma, cervical radiculopathy, tobacco use, chronic low back pain, HLD, macular degeneration who presents for follow up of obstructive sleep apnea s/p Inspire HGNS implantation.    The patient has had poor compliance with use of Inspire since last office visit, as he states the device was not turning on properly, or caused neck pain/spasm that occurred while sleep on his L side.     His overall usage is 30% (9/30 days), and 10% use longer than 4 hours (3/30 days), with average therapy duration of 4 hours 11 minutes and 0.1 pauses.     An impedance check was completed during this office visit, which showed appropriate functioning of the device at all settings. Waveforms were also assessed, there was no evidence of downward deflection.     The patient's settings were adjusted today, with a new amplitude range of 2.2 to 2.8V, he will start at 2.3V (level 2) tonight. I encouraged the patient to continue to try to use Inspire each night, and additionally increase by one level each week. If he experiences discomfort and prevents this from increasing levels, I advised him to call the office.     Inspire Settings  Inspire Incoming Settings  Electrode configuration A + - +   Amplitude (V) 2.2  Range (V) 2.0 to 2.5  Pulse width (us)/ Rate (Hz)  120/33  Start delay (min) 40  Pause Time (min) 30  Therapy Duration (hr) 8     Inspire Settings  Outgoing Settings  Electrode configuration A + - +   Amplitude (V) 2.3  Range (V) 2.2 to 2.8  Pulse width (us)/ Rate (Hz)  120/40  Start delay (min) 30  Pause Time (min) 15  Therapy Duration (hr) 7            Patient to follow up in office in approximately 1 month. Patient instructed to call office or send [employer] message with any questions or concerns in the interim.      1. MILDRED (obstructive  "sleep apnea)    2. S/P insertion of hypoglossal nerve stimulator    3. Intolerance of continuous positive airway pressure (CPAP) ventilation       HPI:    Jose Becker is a 68 y.o. male with PMH including CAD with h/o STEMI, s/p CABG, moderate persistent asthma, cervical radiculopathy, tobacco use, chronic low back pain, HLD, macular degeneration. He presents today for follow up of obstructive sleep apnea s/p Inspire hypoglossal nerve stimulator placement. Patient was last seen in the office on 3/21/2024.     Patient has a history of moderate obstructive sleep apnea. He underwent diagnostic PSG 2021 which showed AHI of 23.7. He subsequently has difficulty tolerating CPAP and thus pursued Inspire HGNS.    He was implanted in 9/2021 and was activated in the office in 10/2021.    At last office visit on 3/21/2024, he complained of a zapping/pulling sensation in his neck with the use of Inspire when he was at configuration B. Therefore, his configuration was changed back to A at last office visit with a pulse width and rate of 120/33 with a range of 2.0 to 2.5V. He is currently at 2.2V.    He states he was doing well immediately after the last configuration changes were made. However he feels that eventually he developed pain and spasming in his neck, but this seemed to occur while using the same voltage (he did not increase the voltage level) and this only occurred when lying laterally on his L side.    He also states that the device \"no longer turns on\" and therefore he has not used the Inspire in at least 3-4 weeks now due to this reason. He wakes up with nocturia and does not feel that the device is on overnight. Since he has not felt it was not working correctly, he has not tried using it.     He also states he has never felt refreshed or less tired/more energetic after implantation. He feels somewhat tired in the morning, but denies excessive daytime sleepiness or fatigue. When he uses Inspire, he no longer " snores per his wife.     Inspire Settings  Inspire Incoming Settings  Electrode configuration A + - +   Amplitude (V) 2.2  Range (V) 2.0 to 2.5  Pulse width (us)/ Rate (Hz)  120/33  Start delay (min) 40  Pause Time (min) 30  Therapy Duration (hr) 8     Work history: does not work    Living situation: lives with wife and son     Sleep Schedule: Patient goes to bed between 9:00-10:00PM. Sleep latency is approximately 15-30minutes. He does not wake up after Inspire turns on. He wakes 2-3x for nocturia at which point he pauses the device. Recently, he has not felt that the device was not on after waking up over night and therefore could not pause the device. He wakes up at     [place]: 0/24  Sitting and reading: Would never doze  Watching TV: Would never doze  Sitting, inactive in a public place (e.g. a theatre or a meeting): Would never doze  As a passenger in a car for an hour without a break: Would never doze  Lying down to rest in the afternoon when circumstances permit: Would never doze  Sitting and talking to someone: Would never doze  Sitting quietly after a lunch without alcohol: Would never doze  In a car, while stopped for a few minutes in traffic: Would never doze  Total score: 0      Social history updates:  Social History     Tobacco Use   Smoking Status Former    Current packs/day: 0.00    Average packs/day: 0.5 packs/day for 6.0 years (3.0 ttl pk-yrs)    Types: Cigarettes    Start date: 2003    Quit date: 2009    Years since quitting: 15.3   Smokeless Tobacco Former     Social History     Socioeconomic History    Marital status: /Civil Union     Spouse name: Not on file    Number of children: Not on file    Years of education: Not on file    Highest education level: Not on file   Occupational History    Not on file   Tobacco Use    Smoking status: Former     Current packs/day: 0.00     Average packs/day: 0.5 packs/day for 6.0 years (3.0 ttl pk-yrs)     Types: Cigarettes     Start date: 2003      "Quit date: 2009     Years since quitting: 15.3    Smokeless tobacco: Former   Vaping Use    Vaping status: Never Used   Substance and Sexual Activity    Alcohol use: Not Currently    Drug use: Never    Sexual activity: Not on file   Other Topics Concern    Not on file   Social History Narrative    Daily caffeine use - 1 cup coffee     Social Determinants of Health     Financial Resource Strain: Not on file   Food Insecurity: Not on file   Transportation Needs: Not on file   Physical Activity: Not on file   Stress: Not on file   Social Connections: Not on file   Intimate Partner Violence: Not on file   Housing Stability: Not on file       PhysicalExamination:  Vitals:   /79 (BP Location: Left arm, Patient Position: Sitting, Cuff Size: Large)   Ht 5' 11\" (1.803 m)   Wt 103 kg (226 lb)   BMI 31.52 kg/m²     Physical Exam:  General: Sitting in chair, awake alert and oriented to person, place, and time. No acute distress  HEENT: PERRL, nares patent, no craniofacial abnormalities. Mucous membranes, moist, no oral lesions, and normal dentition. Mallampati class IV, full range of motion of tongue, no neuropraxia  NECK: Trachea midline, no accessory muscle use, and no stridor   CARDIAC: Regular rate and rhythm, no murmur   PULM: CTA bilaterally no wheezing, rhonchi or rales. No conversational dyspnea  EXT: No cyanosis, no clubbing, and no peripheral edema    NEURO: No focal neurologic deficits, moving all extremities appropriately    Diagnostic Data:  Labs:  I personally reviewed the most recent laboratory data pertinent to today's visit    Lab Results   Component Value Date    WBC 4.63 02/14/2022    HGB 15.5 02/14/2022    HCT 47.1 02/14/2022    MCV 90 02/14/2022     02/14/2022     Lab Results   Component Value Date    CALCIUM 8.7 12/05/2023    K 4.1 12/05/2023    CO2 26 12/05/2023     12/05/2023    BUN 10 12/05/2023    CREATININE 0.78 12/05/2023     No results found for: \"IGE\"  Lab Results   Component " "Value Date    ALT 20 12/05/2023    AST 17 12/05/2023    ALKPHOS 92 12/05/2023     Lab Results   Component Value Date    IRON 77 02/16/2022    TIBC 296 02/16/2022    FERRITIN 186 02/16/2022     Lab Results   Component Value Date    BUAZCKLW09 580 02/16/2022     No results found for: \"FOLATE\"      Arterial Blood Gas result:  N/A     Sleep studies:  Diagnostic PSG 5/22/2019: AHI 27.4, SpO2 pio 86%    Diagnostic PSG with Inspire 1/26/2022: The patient slept for a total 393 minutes representing sleep efficiency of 90%. He had significant reduction of sleep disordered breathing events down from an average AHI of 30 events per hour on the control night study to 1.5 events per hour with titrating the inspire up to 3.4 volts. Thus continuing to use inspire with patient control range of 3.2 to 4.2 volts with an amplitude of 3.4 volts would be recommended. In addition, the patient has increased number of periodic limb movements with a PLM index of 96.9 events per hour. Excessive daytime sleepiness persist after successful treatment of sleep disordered breathing; checking a CBC, BUN, creatinine, TSH, vitamin B12, vitamin-D, magnesium, iron studies including ferritin to rule out underlying metabolic disorders associated with periodic limb movement disorder would be recommended, clinical correlation suggested.     HST with Inspire 5/4/2022:Mr. Becker demonstrated an increased number of sleep related respiratory events (FANNIE - 25.3) which is consistent with moderate obstructive sleep apnea. The vast majority of these events were obstructive apnea but there were a few central events noted as well (AUGIE - 3.2, mixed apnea 1.5).  I would encourage advancing Inspire therapy as tolerated.       HST with Inspire 1/12/2022: INSPIRE device decreased burden of sleep apnea to mild (8.6 events/hour). Follow up with sleep physician and titration of INSPIRE as tolerated is recommended. Alternatively, a positional device/therapy can be " recommended with INSPIRE use as majority of events occurred in supine position.   Clinical correlation is advised.       Dhruv Bui   [facility]'s Sleep Medicine Fellow

## 2024-04-25 NOTE — PATIENT INSTRUCTIONS
Each week on Thursday, increase the Inspire remote setting by one level. You are on Level 2 today.  Each week, increase by one level as tolerated   If you experience discomfort and this disturbs her sleep or prevents an increase in the levels, please let me know.  If this occurs, a follow-up visit may be needed to change some comfort settings with the inspire device.    Please remember you must turn off (White button) or pause the Inspire device anytime you are eating, drinking, chewing, or swallowing as there is a risk of choking if you do so when the Inspire device is active.     Please bring your remote to each visit with me    If you have device questions in the middle of the night, please call Inspire support  ([phone])     For problems using Inspire, sleep issues, etc, please contact me in the office in the day or send a message via [employer]

## 2024-06-27 ENCOUNTER — OFFICE VISIT (OUTPATIENT)
Dept: SLEEP CENTER | Facility: CLINIC | Age: 69
End: 2024-06-27
Payer: MEDICARE

## 2024-06-27 VITALS
SYSTOLIC BLOOD PRESSURE: 112 MMHG | DIASTOLIC BLOOD PRESSURE: 68 MMHG | BODY MASS INDEX: 31.22 KG/M2 | HEIGHT: 71 IN | WEIGHT: 223 LBS

## 2024-06-27 DIAGNOSIS — G47.33 OSA (OBSTRUCTIVE SLEEP APNEA): Primary | ICD-10-CM

## 2024-06-27 DIAGNOSIS — E83.10 DISORDER OF IRON METABOLISM: ICD-10-CM

## 2024-06-27 DIAGNOSIS — G25.81 RESTLESS LEG SYNDROME: ICD-10-CM

## 2024-06-27 DIAGNOSIS — R06.83 SNORING: ICD-10-CM

## 2024-06-27 DIAGNOSIS — Z96.82 S/P INSERTION OF HYPOGLOSSAL NERVE STIMULATOR: ICD-10-CM

## 2024-06-27 PROBLEM — G47.19 EXCESSIVE DAYTIME SLEEPINESS: Status: RESOLVED | Noted: 2021-10-20 | Resolved: 2024-06-27

## 2024-06-27 PROCEDURE — 95976 ALYS SMPL CN NPGT PRGRMG: CPT | Performed by: PSYCHIATRY & NEUROLOGY

## 2024-06-27 PROCEDURE — 99214 OFFICE O/P EST MOD 30 MIN: CPT | Performed by: PSYCHIATRY & NEUROLOGY

## 2024-06-27 NOTE — ASSESSMENT & PLAN NOTE
-Describes symptoms that can be construed as restless legs although they do not occur at nighttime nor do they interfere with sleep  -Already prescribed gabapentin by his primary care physician, advised he can talk to his PCP about increasing gabapentin either to 600 mg at once or 300 mg in the morning and afternoon  -Will check an iron panel, asked him to follow-up with me for the results as if he has his test done outside of our network I may not get the results directly

## 2024-06-27 NOTE — ASSESSMENT & PLAN NOTE
"-Inconsistently using the inspire device, reinforced again the importance of using it every night  -Explained that he does not feel stimulations in the middle of the night as his brain is likely \"tuning it out\".  This does not mean the device is not working.  He has excellent tongue protrusion and a system check at the previous visit was normal.  -He did not have many symptoms of obstructive sleep apnea to begin with, we discussed that therefore he may not expect a dramatic symptomatic improvement  -Will plan for a follow-up visit in 1 month in the [place] office which is closer to his home, Irvin Raines from [employer] will be at that visit  -If he has consistent use, would then plan for a home sleep test to verify effectiveness of the current setting  -Discussed the case in detail with Irvin Raines from [employer] who is at today's visit  "

## 2024-06-27 NOTE — PROGRESS NOTES
"Assessment/Plan:    1. MILDRED (obstructive sleep apnea)  Assessment & Plan:  -Inconsistently using the inspire device, reinforced again the importance of using it every night  -Explained that he does not feel stimulations in the middle of the night as his brain is likely \"tuning it out\".  This does not mean the device is not working.  He has excellent tongue protrusion and a system check at the previous visit was normal.  -He did not have many symptoms of obstructive sleep apnea to begin with, we discussed that therefore he may not expect a dramatic symptomatic improvement  -Will plan for a follow-up visit in 1 month in the [place] office which is closer to his home, Irvin Raines from [employer] will be at that visit  -If he has consistent use, would then plan for a home sleep test to verify effectiveness of the current setting  -Discussed the case in detail with Irvin Raines from [employer] who is at today's visit  2. Disorder of iron metabolism  -     Iron Panel (Includes Ferritin, Iron Sat%, Iron, and TIBC); Future  3. Restless leg syndrome  Assessment & Plan:  -Describes symptoms that can be construed as restless legs although they do not occur at nighttime nor do they interfere with sleep  -Already prescribed gabapentin by his primary care physician, advised he can talk to his PCP about increasing gabapentin either to 600 mg at once or 300 mg in the morning and afternoon  -Will check an iron panel, asked him to follow-up with me for the results as if he has his test done outside of our network I may not get the results directly  Orders:  -     Iron Panel (Includes Ferritin, Iron Sat%, Iron, and TIBC); Future  4. S/P insertion of hypoglossal nerve stimulator  5. Snoring  Assessment & Plan:  -This was his prominent symptom that led to pursuing inspire    Inspire Settings  Outgoing Settings  Electrode configuration A + - +   Amplitude (V) 2.4  Range (V) 2.2 to 2.8  Pulse width (us)/ Rate (Hz)  120/33  Start delay (min) " 30  Pause Time (min) 15  Therapy Duration (hr) 8     Subjective:      Patient ID: Jose Becker is a 68 y.o. male.    HPI    Inspire Hx  He has a history of moderate obstructive sleep apnea, AHI 23.7 diagnosed in 2021.    His device was first activated in October 2021 at 0.8 V on configuration B, off minus off.  At a follow-up visit with Dr. Darling, it seems that patient was changed to electrode configuration a at the range of 2 to 3 V.  At a follow-up visit with Dr. eDshpande, the device was further titrated on configuration A. He had an inspire fine-tune sleep study in January 2022-that test showed an effective result with an AHI of 2 at the setting of 3.2 to 3.4 V on electrode configuration A..  In a follow-up visit after the test, he was maintained on the current settings.  May 2022-notes describe some issues with tongue movement and discomfort, was at 2.8 V.  Sleep test in May 2022 showed a respiratory event index of 25.3, there were 32 central/mixed apneas out of 174 events.  Follow-up after that test, it was discussed that he had intolerance of higher voltage settings on configuration a and the patient was changed to configuration B at 1.3 V.  The patient had some symptoms of right-sided neck pain and back pain which she felt was attributable to the inspire device prior to this change.  December 2022-due to discomfort, pulse width and rate was changed to 60/40 configuration B.  A subsequent home sleep test in January 2023 showed a respiratory event index of 8.6, events much worse in the supine position, normal oxygenation.  Was at 1.3 volts?  At his visit in February 2024, he was adjusted to 1.1 V with a pulse width/rate combination of 120/40.  At his visit in March 2024, describe waking up with discomfort from the device, was changed to 120/33, 2.2 V.  At a follow-up in April 2024, he had concern the device was not functioning properly.  Waveforms and impedance were checked and were normal.    Chief  "complaint-feels that the inspire device turned off in the middle of the night and is not working properly,    Jose describes that he is not using inspire consistently.  He gets frustrated as he feels tongue stimulation when he starts the night but awakens in the middle of the night and does not feel his tongue moving.  He is concerned the device is not working and therefore has not been using it consistently.    Notes leg movement in the day.  Occurs when sitting, anytime.  Not worse at night. Takes gabapentin 300 mg in the AM     Primary symptoms of concern that led to Inspire- snoring, did not have a lot of symptoms before such as EDS     [place] Sleepiness Scale  Sitting and reading: Would never doze  Watching TV: (P) Moderate chance of dozing  Sitting, inactive in a public place (e.g. a theatre or a meeting): (P) Slight chance of dozing  As a passenger in a car for an hour without a break: (P) Slight chance of dozing  Lying down to rest in the afternoon when circumstances permit: (P) Moderate chance of dozing  Sitting and talking to someone: (P) Slight chance of dozing  Sitting quietly after a lunch without alcohol: (P) Slight chance of dozing  In a car, while stopped for a few minutes in traffic: (P) Would never doze  Total score: 8    Inspire Settings  Incoming Settings  Electrode configuration A + - +   Amplitude (V) 2.4  Range (V) 2.2 to 2.8  Pulse width (us)/ Rate (Hz)  120/33  Start delay (min) 30  Pause Time (min) 15  Therapy Duration (hr) 7    Inspire Usage Data  Used 34 of 63 days, average session 5.3 hr  0.2 pauses per night          Review of Systems  (As above unless noted)    Objective:      /68   Ht 5' 11\" (1.803 m)   Wt 101 kg (223 lb)   BMI 31.10 kg/m²          Physical Exam    Tongue midline, has full ROM  Has great tongue protrusion with Inspire at incoming   "

## 2024-06-27 NOTE — PATIENT INSTRUCTIONS
It is very important to use Inspire every night.  Your tongue movement is excellent and Inspire is working well.         Nursing Support:  When: Monday through Friday 7A-5PM except holidays  Where: Our direct line is [phone].    If you are having a true emergency please call 911.  In the event that the line is busy or it is after hours please leave a voice message and we will return your call.  Please speak clearly, leaving your full name, birth date, best number to reach you and the reason for your call.   Medication refills: We will need the name of the medication, the dosage, the ordering provider, whether you get a 30 or 90 day refill, and the pharmacy name and address.  Medications will be ordered by the provider only.  Nurses cannot call in prescriptions.  Please allow 7 days for medication refills.  Physician requested updates: If your provider requested that you call with an update after starting medication, please be ready to provide us the medication and dosage, what time you take your medication, the time you attempt to fall asleep, time you fall asleep, when you wake up, and what time you get out of bed.  Sleep Study Results: We will contact you with sleep study results and/or next steps after the physician has reviewed your testing.

## 2024-08-01 DIAGNOSIS — N52.9 ERECTILE DYSFUNCTION, UNSPECIFIED ERECTILE DYSFUNCTION TYPE: ICD-10-CM

## 2024-08-01 RX ORDER — TADALAFIL 5 MG/1
5 TABLET ORAL DAILY
Qty: 90 TABLET | Refills: 0 | Status: SHIPPED | OUTPATIENT
Start: 2024-08-01

## 2024-08-26 ENCOUNTER — LAB (OUTPATIENT)
Dept: LAB | Facility: CLINIC | Age: 69
End: 2024-08-26
Payer: MEDICARE

## 2024-08-26 DIAGNOSIS — I50.9 CONGESTIVE HEART FAILURE, UNSPECIFIED HF CHRONICITY, UNSPECIFIED HEART FAILURE TYPE (HCC): ICD-10-CM

## 2024-08-26 DIAGNOSIS — I25.83 CORONARY ARTERY DISEASE DUE TO LIPID RICH PLAQUE: ICD-10-CM

## 2024-08-26 DIAGNOSIS — E55.9 VITAMIN D DEFICIENCY: ICD-10-CM

## 2024-08-26 DIAGNOSIS — N52.9 ERECTILE DYSFUNCTION, UNSPECIFIED ERECTILE DYSFUNCTION TYPE: ICD-10-CM

## 2024-08-26 DIAGNOSIS — E83.10 DISORDER OF IRON METABOLISM: ICD-10-CM

## 2024-08-26 DIAGNOSIS — Z12.5 PROSTATE CANCER SCREENING: ICD-10-CM

## 2024-08-26 DIAGNOSIS — G25.81 RESTLESS LEG SYNDROME: ICD-10-CM

## 2024-08-26 DIAGNOSIS — F52.32 ANORGASMIA OF MALE: ICD-10-CM

## 2024-08-26 DIAGNOSIS — E78.5 HYPERLIPIDEMIA, UNSPECIFIED HYPERLIPIDEMIA TYPE: ICD-10-CM

## 2024-08-26 DIAGNOSIS — I25.10 CORONARY ARTERY DISEASE DUE TO LIPID RICH PLAQUE: ICD-10-CM

## 2024-08-26 LAB
25(OH)D3 SERPL-MCNC: 51.2 NG/ML (ref 30–100)
ALBUMIN SERPL BCG-MCNC: 4.1 G/DL (ref 3.5–5)
ALP SERPL-CCNC: 85 U/L (ref 34–104)
ALT SERPL W P-5'-P-CCNC: 13 U/L (ref 7–52)
ANION GAP SERPL CALCULATED.3IONS-SCNC: 8 MMOL/L (ref 4–13)
AST SERPL W P-5'-P-CCNC: 18 U/L (ref 13–39)
BILIRUB SERPL-MCNC: 0.67 MG/DL (ref 0.2–1)
BNP SERPL-MCNC: 37 PG/ML (ref 0–100)
BUN SERPL-MCNC: 12 MG/DL (ref 5–25)
CALCIUM SERPL-MCNC: 9.2 MG/DL (ref 8.4–10.2)
CHLORIDE SERPL-SCNC: 104 MMOL/L (ref 96–108)
CHOLEST SERPL-MCNC: 123 MG/DL
CO2 SERPL-SCNC: 27 MMOL/L (ref 21–32)
CREAT SERPL-MCNC: 0.75 MG/DL (ref 0.6–1.3)
ERYTHROCYTE [DISTWIDTH] IN BLOOD BY AUTOMATED COUNT: 12.7 % (ref 11.6–15.1)
EST. AVERAGE GLUCOSE BLD GHB EST-MCNC: 108 MG/DL
GFR SERPL CREATININE-BSD FRML MDRD: 93 ML/MIN/1.73SQ M
GLUCOSE P FAST SERPL-MCNC: 103 MG/DL (ref 65–99)
HBA1C MFR BLD: 5.4 %
HCT VFR BLD AUTO: 47.3 % (ref 36.5–49.3)
HDLC SERPL-MCNC: 40 MG/DL
HGB BLD-MCNC: 16.1 G/DL (ref 12–17)
IRON SATN MFR SERPL: 44 % (ref 15–50)
IRON SERPL-MCNC: 127 UG/DL (ref 50–212)
LDLC SERPL CALC-MCNC: 59 MG/DL (ref 0–100)
MCH RBC QN AUTO: 30.2 PG (ref 26.8–34.3)
MCHC RBC AUTO-ENTMCNC: 34 G/DL (ref 31.4–37.4)
MCV RBC AUTO: 89 FL (ref 82–98)
NONHDLC SERPL-MCNC: 83 MG/DL
PLATELET # BLD AUTO: 174 THOUSANDS/UL (ref 149–390)
PMV BLD AUTO: 10.3 FL (ref 8.9–12.7)
POTASSIUM SERPL-SCNC: 4.8 MMOL/L (ref 3.5–5.3)
PROT SERPL-MCNC: 6.8 G/DL (ref 6.4–8.4)
PSA SERPL-MCNC: 4.12 NG/ML (ref 0–4)
RBC # BLD AUTO: 5.33 MILLION/UL (ref 3.88–5.62)
SODIUM SERPL-SCNC: 139 MMOL/L (ref 135–147)
TIBC SERPL-MCNC: 286 UG/DL (ref 250–450)
TRIGL SERPL-MCNC: 120 MG/DL
TSH SERPL DL<=0.05 MIU/L-ACNC: 1.52 UIU/ML (ref 0.45–4.5)
UIBC SERPL-MCNC: 159 UG/DL (ref 155–355)
WBC # BLD AUTO: 4.76 THOUSAND/UL (ref 4.31–10.16)

## 2024-08-26 PROCEDURE — 83540 ASSAY OF IRON: CPT

## 2024-08-26 PROCEDURE — 80053 COMPREHEN METABOLIC PANEL: CPT

## 2024-08-26 PROCEDURE — 36415 COLL VENOUS BLD VENIPUNCTURE: CPT

## 2024-08-26 PROCEDURE — 83550 IRON BINDING TEST: CPT

## 2024-08-26 PROCEDURE — 83880 ASSAY OF NATRIURETIC PEPTIDE: CPT

## 2024-08-26 PROCEDURE — 82306 VITAMIN D 25 HYDROXY: CPT

## 2024-08-26 PROCEDURE — 83036 HEMOGLOBIN GLYCOSYLATED A1C: CPT

## 2024-08-26 PROCEDURE — 85027 COMPLETE CBC AUTOMATED: CPT

## 2024-08-26 PROCEDURE — 84403 ASSAY OF TOTAL TESTOSTERONE: CPT

## 2024-08-26 PROCEDURE — 86803 HEPATITIS C AB TEST: CPT

## 2024-08-26 PROCEDURE — 80061 LIPID PANEL: CPT

## 2024-08-26 PROCEDURE — 84443 ASSAY THYROID STIM HORMONE: CPT

## 2024-08-26 PROCEDURE — 82728 ASSAY OF FERRITIN: CPT

## 2024-08-26 PROCEDURE — 84402 ASSAY OF FREE TESTOSTERONE: CPT

## 2024-08-26 PROCEDURE — G0103 PSA SCREENING: HCPCS

## 2024-08-27 DIAGNOSIS — R97.20 ELEVATED PSA: Primary | ICD-10-CM

## 2024-08-27 LAB
FERRITIN SERPL-MCNC: 183 NG/ML (ref 24–336)
HCV AB SER QL: NORMAL
TESTOST FREE SERPL-MCNC: 3.6 PG/ML (ref 6.6–18.1)
TESTOST SERPL-MCNC: 416 NG/DL (ref 264–916)

## 2024-08-27 NOTE — RESULT ENCOUNTER NOTE
Patient informed through his wife, he will get another PSA done-if still elevated he will need MRI of the prostate.

## 2024-08-27 NOTE — PROGRESS NOTES
Component  Ref Range & Units 24 10:26 AM 22 10:31 AM 21  9:14 AM 20  9:48 AM   PSA  0.000 - 4.000 ng/mL 4.120 High  2.5 R, CM 2.2 R, CM 1.5 R, CM   PSA 2024 up to 4.120.  We will repeat this.  If still elevated he will need MRI of the prostate.  I told his wife.  They are on their way out of the door for a  of his father but she said she would pass on the message and have the PSA done.  I will let him know the results.

## 2024-08-29 ENCOUNTER — APPOINTMENT (OUTPATIENT)
Dept: LAB | Facility: MEDICAL CENTER | Age: 69
End: 2024-08-29
Payer: MEDICARE

## 2024-08-29 ENCOUNTER — OFFICE VISIT (OUTPATIENT)
Dept: SLEEP CENTER | Facility: CLINIC | Age: 69
End: 2024-08-29
Payer: MEDICARE

## 2024-08-29 VITALS
DIASTOLIC BLOOD PRESSURE: 64 MMHG | RESPIRATION RATE: 16 BRPM | TEMPERATURE: 98.2 F | WEIGHT: 214.8 LBS | SYSTOLIC BLOOD PRESSURE: 124 MMHG | OXYGEN SATURATION: 97 % | BODY MASS INDEX: 30.07 KG/M2 | HEIGHT: 71 IN | HEART RATE: 75 BPM

## 2024-08-29 DIAGNOSIS — Z96.82 S/P INSERTION OF HYPOGLOSSAL NERVE STIMULATOR: ICD-10-CM

## 2024-08-29 DIAGNOSIS — G47.33 OSA (OBSTRUCTIVE SLEEP APNEA): Primary | ICD-10-CM

## 2024-08-29 DIAGNOSIS — G25.81 RESTLESS LEG SYNDROME: ICD-10-CM

## 2024-08-29 DIAGNOSIS — R97.20 ELEVATED PSA: ICD-10-CM

## 2024-08-29 LAB — PSA SERPL-MCNC: 4.03 NG/ML (ref 0–4)

## 2024-08-29 PROCEDURE — 84153 ASSAY OF PSA TOTAL: CPT

## 2024-08-29 PROCEDURE — 36415 COLL VENOUS BLD VENIPUNCTURE: CPT

## 2024-08-29 PROCEDURE — 99214 OFFICE O/P EST MOD 30 MIN: CPT | Performed by: STUDENT IN AN ORGANIZED HEALTH CARE EDUCATION/TRAINING PROGRAM

## 2024-08-29 PROCEDURE — G2211 COMPLEX E/M VISIT ADD ON: HCPCS | Performed by: STUDENT IN AN ORGANIZED HEALTH CARE EDUCATION/TRAINING PROGRAM

## 2024-08-29 RX ORDER — DULOXETIN HYDROCHLORIDE 30 MG/1
CAPSULE, DELAYED RELEASE ORAL
COMMUNITY

## 2024-08-29 RX ORDER — OXYCODONE AND ACETAMINOPHEN 5; 325 MG/1; MG/1
TABLET ORAL
COMMUNITY
Start: 2024-08-29

## 2024-08-29 RX ORDER — CLINDAMYCIN HCL 300 MG
CAPSULE ORAL
COMMUNITY
Start: 2024-08-20

## 2024-08-29 RX ORDER — GABAPENTIN 600 MG/1
TABLET, FILM COATED ORAL
COMMUNITY

## 2024-08-29 RX ORDER — BUDESONIDE AND FORMOTEROL FUMARATE DIHYDRATE 160; 4.5 UG/1; UG/1
AEROSOL RESPIRATORY (INHALATION)
COMMUNITY

## 2024-08-29 RX ORDER — CHLORHEXIDINE GLUCONATE ORAL RINSE 1.2 MG/ML
SOLUTION DENTAL
COMMUNITY
Start: 2024-08-20

## 2024-08-29 RX ORDER — TIOTROPIUM BROMIDE INHALATION SPRAY 1.56 UG/1
SPRAY, METERED RESPIRATORY (INHALATION)
COMMUNITY

## 2024-08-29 RX ORDER — ALIROCUMAB 75 MG/ML
1 INJECTION, SOLUTION SUBCUTANEOUS
COMMUNITY
Start: 2024-03-06

## 2024-08-29 NOTE — PROGRESS NOTES
[facility]  Sleep Medicine Follow up/ Established Patient Visit      Assessment/Plan:  1. MILDRED (obstructive sleep apnea)  Diagnostic Sleep Study with Inspire      2. Restless leg syndrome  Diagnostic Sleep Study with Inspire      3. S/P insertion of hypoglossal nerve stimulator  Diagnostic Sleep Study with Inspire          Jose is a pleasant 68-year-old gentleman with a PMHx of CAD status post STEMI status post PTCA, HTN, cervical radiculopathy, macular degeneration, HLD, RLS, PLMD who presents in follow up for MILDRED (baseline AHI 23.7 in 2021) status post inspire implantation, activation 10/2021.  Upon review of his history, he has had numerous settings changes, both of configuration and pulse width/rate, in order to try to help with overall comfort.  Interestingly, at his visit today, he actually is fairly adamant that he had the most comfort and efficacy when he first got the device, back on Default configuration and pulse width/rate.  Upon testing his device at these settings today, he also endorsed that this was much more comfortable, and upon my review it appeared to show the most optimal tongue protrusion.    At this time, we will revert his settings back to configuration a with the false pulse width/rate of 90/33, and start him at amplitude of 2.6.  Will not change his other settings.  Will plan for a retitration study in ~2 months, after a 1 month follow-up to ensure adequate usage and therapeutic benefit.  Reviewed the process of attempting to increase voltage by 1 setting weekly.  Reviewed the importance of treating MILDRED, and remembering to utilize his inspire therapy regularly.    At this time, he denies substantial symptoms of his restless leg syndrome; will continue to monitor this closely and determine if medication changes are needed in the future.  He will Return in about 1 month (around 9/29/2024).    Inspire Settings:  Outgoing Settings  Electrode configuration A + - +  "  Amplitude (V) 2.6  Range (V) 2.4 to 3.4  Pulse width (us)/ Rate (Hz)  90/33  Start delay (min) 30  Pause Time (min) 15  Therapy Duration (hr) 8    ________________________________________________________________________________________________    Per Last Visit Note (Date: 6/27/2024):  Assessment/Plan:     1. MILDRED (obstructive sleep apnea)  Assessment & Plan:  -Inconsistently using the inspire device, reinforced again the importance of using it every night  -Explained that he does not feel stimulations in the middle of the night as his brain is likely \"tuning it out\".  This does not mean the device is not working.  He has excellent tongue protrusion and a system check at the previous visit was normal.  -He did not have many symptoms of obstructive sleep apnea to begin with, we discussed that therefore he may not expect a dramatic symptomatic improvement  -Will plan for a follow-up visit in 1 month in the [place] office which is closer to his home, Irvin Raines from [facility] will be at that visit  -If he has consistent use, would then plan for a home sleep test to verify effectiveness of the current setting  -Discussed the case in detail with Irvin Raines from [facility] who is at today's visit  2. Disorder of iron metabolism  -     Iron Panel (Includes Ferritin, Iron Sat%, Iron, and TIBC); Future  3. Restless leg syndrome  Assessment & Plan:  -Describes symptoms that can be construed as restless legs although they do not occur at nighttime nor do they interfere with sleep  -Already prescribed gabapentin by his primary care physician, advised he can talk to his PCP about increasing gabapentin either to 600 mg at once or 300 mg in the morning and afternoon  -Will check an iron panel, asked him to follow-up with me for the results as if he has his test done outside of our network I may not get the results directly  Orders:  -     Iron Panel (Includes Ferritin, Iron Sat%, Iron, and TIBC); Future  4. S/P insertion of " hypoglossal nerve stimulator  5. Snoring  Assessment & Plan:  -This was his prominent symptom that led to pursuing inspire      Sleep Studies:  -HSAT 1/12/2023: TRT: 63.8 minutes, FANNIE: 8.6, O2 pio: 89%     -HSAT 8/25/2022: TRT: 6 hours 57 minutes, FANNIE: 25.3, O2 pio: 86% 32 central/mixed apneas of 174 events.    -Inspire Titration Study 1/27/2022: Effective titration with AHI of 2 at settings of 3.2-3.4 V on electrode configuration 8.    -Note: Baseline AHI 30 on control night study.  Additional documentation stating an AHI of 23.7 in 2021.    ________________________________________________________________________________________________      Interval History: Jose Becker is a 68 y.o. male with a PMHx of CAD status post STEMI status post PTCA, HTN, cervical radiculopathy, macular degeneration, HLD, RLS, PLMD who presents in follow up for MILDRED (baseline AHI 23.7 in 2021) status post inspire implantation, activation 10/2021.    Inspire Hx  -Primary symptoms of concern the lead to inspire: Snoring, not a lot of symptoms before such as EDS.  -He has a history of moderate obstructive sleep apnea, AHI 23.7 diagnosed in 2021.      -His device was first activated in October 2021 at 0.8 V on configuration B, off minus off.    -At a follow-up visit with Dr. Darling, it seems that patient was changed to electrode configuration a at the range of 2 to 3 V.    -At a follow-up visit with Dr. Deshpande, the device was further titrated on configuration A.   -He had an inspire fine-tune sleep study in January 2022-that test showed an effective result with an AHI of 2 at the setting of 3.2 to 3.4 V on electrode configuration A..    -In a follow-up visit after the test, he was maintained on the current settings.  May 2022-notes describe some issues with tongue movement and discomfort, was at 2.8 V.    -Sleep test in May 2022 showed a respiratory event index of 25.3, there were 32 central/mixed apneas out of 174 events.    -Follow-up  "after that test, it was discussed that he had intolerance of higher voltage settings on configuration a and the patient was changed to configuration B at 1.3 V.  The patient had some symptoms of right-sided neck pain and back pain which she felt was attributable to the inspire device prior to this change.    -December 2022-due to discomfort, pulse width and rate was changed to 60/40 configuration B.    -A subsequent home sleep test in January 2023 showed a respiratory event index of 8.6, events much worse in the supine position, normal oxygenation.  Was at 1.3 volts?    -At his visit in February 2024, he was adjusted to 1.1 V with a pulse width/rate combination of 120/40.    -At his visit in March 2024, describe waking up with discomfort from the device, was changed to 120/33, 2.2 V.    -At a follow-up in April 2024, he had concern the device was not functioning properly.  Waveforms and impedance were checked and were normal.  -At his visit in June 2024, he expressed concern that the device was \"turning off in the middle of the night\" and not working properly.  Was not using it consistently as a result of this, stating that he felt the tongue stimulation when he first started the night but awakens in the middle of the night and did not feel it.  Reassurance was provided and he was encouraged to use it more consistently, with plans for a repeat HSAT at his subsequent appointment to verify efficacy.      At today's visit, he states that the device is waking him up in the middle of the night; that it is \"pilling in his neck\" on the right side.  Also still endorses times when he feels like it is not working when he wakes in the middle of the night.    Still at setting 3 (from last visit) currently. He says that \"the first few years I had it, I felt great\" (Pulse width/rate 90/33, configuration A; \"default settings\").       On testing of current incoming settings, tongue protruded forward but with strong deviation to the " left. Patient also endorsed significant discomfort with this when stimulation turned on.     Changed settings to 2.6V in configuration A with pulse width/rate of 90/33, and tongue protrusion was excellent without deviation or atypical tongue movement, and patient endorsed substantially increased comfort.       Inspire Settings  Incoming Settings  Electrode configuration B off minus off  Amplitude (V) 2.4  Range (V) 2.2 to 2.8  Pulse width (us)/ Rate (Hz)  120/33  Start delay (min) 30  Pause Time (min) 15  Therapy Duration (hr) 8      Iron panel 2024:  Ferritin: 183  Iron saturation: 44%  TIBC: 286  Iron: 127        [place] Sleepiness Scale:  What are your chances of dozing?   0= no chance  1= slight chance  2= moderate chance  3= high chance    Sitting and readin   Watching TV: 1  Sitting, inactive in a public place (e.g. a theatre or a meeting):1  As a passenger in a car for an hour without a break: 1  Lying down to rest in the afternoon when circumstances permit: 3   Sitting and talking to someone: 1  Sitting quietly after a lunch without alcohol: 1  In a car, while stopped for a few minutes in the traffic: 0       TOTAL  10/24  Greater or equal to 10 is positive for excessive daytime sleepiness        SLEEP HYGIENE QUESTIONS:  Bedtime: 2200   Time it takes to fall sleep: <15 minutes  Wake up Time: 0800   Number of times patient wakes up per night: 3-4  Naps: 0  Estimated total sleep time ( in a 24 hour period of time): ~6 hours       SLEEP RELATED ROS  Review of Systems  Allergies   Allergen Reactions    Sulfa Antibiotics     Clarithromycin Rash    Penicillins Hives and Rash       CURRENT MEDICATIONS:  Current Outpatient Medications   Medication Instructions    albuterol (2.5 mg/3 mL) 0.083 % nebulizer solution inhale contents of 1 vial ( 3 milliliters ) in nebulizer by mouth...  (REFER TO PRESCRIPTION NOTES).    albuterol (PROVENTIL HFA,VENTOLIN HFA) 90 mcg/act inhaler 1-2 puffs, Inhalation     "Alirocumab (PRALUENT SC)     aspirin 81 MG tablet 1 tablet, Oral, Daily    budesonide-formoterol (Symbicort) 160-4.5 mcg/act inhaler Inhale by inhalation route for 90 days.    chlorhexidine (PERIDEX) 0.12 % solution     Cholecalciferol 50 MCG (2000 UT) CAPS Daily    clindamycin (CLEOCIN) 300 MG capsule     clopidogrel (PLAVIX) 75 mg tablet clopidogrel 75 mg tablet    DULoxetine (CYMBALTA) 30 mg delayed release capsule Take by oral route for 30 days.    fluticasone-salmeterol (Advair) 250-50 mcg/dose inhaler 1 puff, Inhalation, 2 times daily    Fluticasone-Salmeterol (Advair) 500-50 mcg/dose inhaler     gabapentin (NEURONTIN) 300 mg capsule     Gabapentin, Once-Daily, 600 MG TABS Take by oral route for 90 days.    levothyroxine 100 mcg    metoprolol succinate (TOPROL-XL) 25 mg 24 hr tablet Toprol XL 25 mg tablet,extended release    mometasone (ELOCON) 0.1 % lotion Topical, As needed, 2 drops to each ear canal as needed    montelukast (SINGULAIR) 10 mg tablet montelukast 10 mg tablet    nitroglycerin (NITROSTAT) 0.4 mg SL tablet     OMEGA-3 FATTY ACIDS PO 360mg    omeprazole (PriLOSEC) 20 mg delayed release capsule     oxyCODONE-acetaminophen (PERCOCET) 5-325 mg per tablet     Praluent 75 MG/ML SOAJ 1 mL, Subcutaneous, Every 14 days    Repatha SureClick 140 MG/ML SOAJ No dose, route, or frequency recorded.    SODIUM FLUORIDE, DENTAL GEL, 1.1 % GEL PreviDent 5000 Booster Plus 1.1 % dental paste   BRUSH 1 TIME DAILY PREFERABLY BEFORE BED, DO NOT RINSE AFTER USE    tadalafil (CIALIS) 5 mg, Oral, Daily    tiotropium (Spiriva Respimat) 1.25 MCG/ACT AERS inhaler Spiriva Respimat 1.25 mcg/actuation solution for inhalation   2 puffs daily    tiotropium (Spiriva Respimat) 1.25 MCG/ACT AERS inhaler 2 puffs daily           PHYSICAL EXAMINATION:  Vital Signs: /64 (BP Location: Right arm, Patient Position: Sitting, Cuff Size: Large)   Pulse 75   Temp 98.2 °F (36.8 °C) (Temporal)   Resp 16   Ht 5' 11\" (1.803 m)   Wt 97.4 " kg (214 lb 12.8 oz)   SpO2 97%   BMI 29.96 kg/m²     Constitutional: NAD, well appearing   Mental Status: AAOx3  Skin: Warm, dry, no rashes noted   Eyes: PERRL, normal conjunctiva  Posterior Airspace:   [place] Tongue Position: 4  Retrognathia: absent  Overbite: absent  Tongue Scalloping/Ridging: absent  Uvula: normal  Chest: No evidence of respiratory distress, no accessory muscle use; no evidence of peripheral cyanosis  Abdomen: Soft, NT/ND  Extremities: No digital clubbing or pedal edema  Neuro: Strength 5/5 throughout, sensation grossly intact          Electronically signed by:    Bucky Bob DO  Board-Certified Neurology and Sleep Medicine  [facility]  08/29/24

## 2024-08-29 NOTE — PATIENT INSTRUCTIONS
Each week on Thursday, increase the Inspire remote setting by one level, as tolerated. You are on Level 3 today  If you experience discomfort and this disturbs her sleep or prevents an increase in the levels, please let me know.  If this occurs, a follow-up visit may be needed to change some comfort settings with the inspire device.    We are then ordering a repeat Inspire Titration Study for end of October.    Please remember you must turn off (White button) or pause the Inspire device anytime you are eating, drinking, chewing, or swallowing as there is a risk of choking if you do so when the Inspire device is active.     Please bring your remote to each visit with me    If you have device questions in the middle of the night, please call Inspire support  ([phone])     For problems using Inspire, sleep issues, etc, please contact me in the office in the day or send a message via [employer]

## 2024-09-03 ENCOUNTER — TELEPHONE (OUTPATIENT)
Dept: UROLOGY | Facility: CLINIC | Age: 69
End: 2024-09-03

## 2024-09-03 DIAGNOSIS — R97.20 ELEVATED PSA: Primary | ICD-10-CM

## 2024-09-03 NOTE — TELEPHONE ENCOUNTER
----- Message from Hong Ruiz MD sent at 9/3/2024  9:24 AM EDT -----  Please call patient to schedule him for an MRI of the prostate-I placed the order.  This can be done in [place] if it is too much of a travel to get [place].  I ready called him about the PSA being elevated before and they understand that he wou  ld need an MRI if the PSA was still elevated.  PSA is 4.027 and it was 4.1208 days ago.

## 2024-09-03 NOTE — TELEPHONE ENCOUNTER
Called patient and informed him that an order for an MRI was placed in his chart. Informed the pt that in order to schedule this he will need to call central scheduling. Provided the pt with the central scheduling phone number. Patient confirmed understanding and was thankful for the call.

## 2024-09-06 NOTE — TELEPHONE ENCOUNTER
Patient called back asking for help to schedule his MRI. He was upset saying that [facility] only schedules [facility]. I called [facility] with patient on the line and got a live person to help him schedule his appointment.

## 2024-09-06 NOTE — TELEPHONE ENCOUNTER
Patient called today to ask to be transferred to . Pt states when he dials the number it doesn't connect him to the right place.    Pt was transferred to [phone] for further assistance.    No further action needed at this time.

## 2024-09-13 ENCOUNTER — HOSPITAL ENCOUNTER (OUTPATIENT)
Dept: RADIOLOGY | Facility: HOSPITAL | Age: 69
Discharge: HOME | End: 2024-09-13

## 2024-09-13 DIAGNOSIS — Z96.82 S/P PLACEMENT OF NERVE STIMULATOR: ICD-10-CM

## 2024-09-17 ENCOUNTER — HOSPITAL ENCOUNTER (OUTPATIENT)
Dept: MRI IMAGING | Facility: HOSPITAL | Age: 69
Discharge: HOME | End: 2024-09-17
Payer: MEDICARE

## 2024-09-17 ENCOUNTER — HOSPITAL ENCOUNTER (OUTPATIENT)
Dept: RADIOLOGY | Facility: HOSPITAL | Age: 69
Discharge: HOME | End: 2024-09-17
Payer: MEDICARE

## 2024-09-17 DIAGNOSIS — R97.20 ELEVATED PSA: ICD-10-CM

## 2024-09-17 PROCEDURE — 76377 3D RENDER W/INTRP POSTPROCES: CPT

## 2024-09-17 PROCEDURE — 72197 MRI PELVIS W/O & W/DYE: CPT

## 2024-09-17 PROCEDURE — A9585 GADOBUTROL INJECTION: HCPCS | Performed by: UROLOGY

## 2024-09-17 RX ORDER — GADOBUTROL 604.72 MG/ML
9 INJECTION INTRAVENOUS
Status: COMPLETED | OUTPATIENT
Start: 2024-09-17 | End: 2024-09-17

## 2024-09-17 RX ADMIN — GADOBUTROL 9 ML: 604.72 INJECTION INTRAVENOUS at 13:25

## 2024-09-17 NOTE — QUICK NOTE
Patient had an MRI completed with an Inspire Device implanted.  Stimulator was placed in MRI safe mode with stimulation off.  All conditions met. MRI completed with no complications.

## 2024-09-25 ENCOUNTER — HOSPITAL ENCOUNTER (OUTPATIENT)
Dept: RADIOLOGY | Facility: HOSPITAL | Age: 69
Discharge: HOME | End: 2024-09-25
Payer: MEDICARE

## 2024-09-25 DIAGNOSIS — R10.813 RIGHT LOWER QUADRANT ABDOMINAL TENDERNESS, REBOUND TENDERNESS PRESENCE NOT SPECIFIED: ICD-10-CM

## 2024-09-25 PROCEDURE — 74250 X-RAY XM SM INT 1CNTRST STD: CPT

## 2024-09-26 ENCOUNTER — OFFICE VISIT (OUTPATIENT)
Dept: SLEEP CENTER | Facility: CLINIC | Age: 69
End: 2024-09-26
Payer: MEDICARE

## 2024-09-26 VITALS
BODY MASS INDEX: 28.7 KG/M2 | SYSTOLIC BLOOD PRESSURE: 117 MMHG | RESPIRATION RATE: 16 BRPM | HEART RATE: 73 BPM | HEIGHT: 71 IN | TEMPERATURE: 98.1 F | DIASTOLIC BLOOD PRESSURE: 78 MMHG | OXYGEN SATURATION: 97 % | WEIGHT: 205 LBS

## 2024-09-26 DIAGNOSIS — Z78.9 INTOLERANCE OF CONTINUOUS POSITIVE AIRWAY PRESSURE (CPAP) VENTILATION: ICD-10-CM

## 2024-09-26 DIAGNOSIS — G47.33 OSA (OBSTRUCTIVE SLEEP APNEA): Primary | ICD-10-CM

## 2024-09-26 DIAGNOSIS — Z96.82 S/P INSERTION OF HYPOGLOSSAL NERVE STIMULATOR: ICD-10-CM

## 2024-09-26 DIAGNOSIS — E83.10 DISORDER OF IRON METABOLISM: ICD-10-CM

## 2024-09-26 PROCEDURE — G2211 COMPLEX E/M VISIT ADD ON: HCPCS | Performed by: STUDENT IN AN ORGANIZED HEALTH CARE EDUCATION/TRAINING PROGRAM

## 2024-09-26 PROCEDURE — 99214 OFFICE O/P EST MOD 30 MIN: CPT | Performed by: STUDENT IN AN ORGANIZED HEALTH CARE EDUCATION/TRAINING PROGRAM

## 2024-09-26 NOTE — PATIENT INSTRUCTIONS
Each week on Thursday, increase the Inspire remote setting by one level, as tolerated. You are on Level 3 today  If you experience discomfort and this disturbs her sleep or prevents an increase in the levels, please let me know.  If this occurs, a follow-up visit may be needed to change some comfort settings with the inspire device.    Try utilizing the PAUSE function if it wakes you in the middle of the night, instead of fully turning it off!!       Please remember you must turn off (White button) or pause the Inspire device anytime you are eating, drinking, chewing, or swallowing as there is a risk of choking if you do so when the Inspire device is active.      Please bring your remote to each visit with me     If you have device questions in the middle of the night, please call Inspire support  ([phone])      For problems using Inspire, sleep issues, etc, please contact me in the office in the day or send a message via [employer]

## 2024-09-26 NOTE — PROGRESS NOTES
"[facility]  Sleep Medicine Follow up/ Established Patient Visit      Assessment/Plan:  1. MILDRED (obstructive sleep apnea)        2. S/P insertion of hypoglossal nerve stimulator        3. Intolerance of continuous positive airway pressure (CPAP) ventilation        4. Disorder of iron metabolism          Jose is a pleasant 68-year-old gentleman with a PMHx of CAD status post STEMI status post PTCA, HTN, cervical radiculopathy, macular degeneration, HLD, RLS, PLMD who presents in follow up for MILDRED (baseline AHI 23.7 in 2021) status post inspire implantation, activation 10/2021.  Unfortunately, we seem to have a similar pattern at today's visit as what was dealt with in the past; namely, despite excellent efficacy and lack of symptoms at his current settings at his last appointment, he now tells me that over the past several weeks he has had a recurrence of this \"fishhook/shock\" type sensation in his right lower jaw.  Oddly, he was doing very well for the first ~1 to 2 weeks after his appointment, then abruptly stopped/drastically reduced his use due to the symptoms, all of which is reflected in the Inspire use data that I reviewed today.  These settings were then tested extensively at his visit today, and interestingly, he had none of that sensation.  As such, I have a higher suspicion that this is more of essentially a psychological/stress-induced sensitivity to the stimulus than an abnormality in the hardware itself.  He does also sound to essentially give up on the therapy if he wakes in the middle of the weight with that sensation, really lysing his dysfunction.    I recommended he continue at his current settings, as these generally were very effective for him in the past.  I encouraged him to try utilizing the pause function overnight, to see if that calms the sensation enough that he can fall back asleep and then continue to use it throughout the rest of the night.  To help facilitate " this, we have increased the pause time to 30 minutes  Would like to undergo a retitration study, however this will be dependent on his status at his next appointment.  Reviewed the process of attempting to increase voltage by 1 setting weekly.  Reviewed the importance of treating MILDRED, and remembering to utilize his inspire therapy regularly.    At this time, he denies substantial symptoms of his restless leg syndrome; will continue to monitor this closely and determine if medication changes are needed in the future.  He does also need a new card showing information (including MRI compatibility, etc.); this will be pursued by the Inspire representative.  He will Return for Follow-up in 1-2 months.      Inspire Settings:  Outgoing Settings  Electrode configuration A + - +   Amplitude (V) 2.6  Range (V) 2.4 to 3.4  Pulse width (us)/ Rate (Hz)  90/33  Start delay (min) 30  Pause Time (min) 30  Therapy Duration (hr) 8    ________________________________________________________________________________________________    Per Last Visit Note (Date: 8/29/2024):  Jose is a pleasant 68-year-old gentleman with a PMHx of CAD status post STEMI status post PTCA, HTN, cervical radiculopathy, macular degeneration, HLD, RLS, PLMD who presents in follow up for MILDRED (baseline AHI 23.7 in 2021) status post inspire implantation, activation 10/2021.  Upon review of his history, he has had numerous settings changes, both of configuration and pulse width/rate, in order to try to help with overall comfort.  Interestingly, at his visit today, he actually is fairly adamant that he had the most comfort and efficacy when he first got the device, back on Default configuration and pulse width/rate.  Upon testing his device at these settings today, he also endorsed that this was much more comfortable, and upon my review it appeared to show the most optimal tongue protrusion.     At this time, we will revert his settings back to configuration a with  the false pulse width/rate of 90/33, and start him at amplitude of 2.6.  Will not change his other settings.  Will plan for a retitration study in ~2 months, after a 1 month follow-up to ensure adequate usage and therapeutic benefit.  Reviewed the process of attempting to increase voltage by 1 setting weekly.  Reviewed the importance of treating MILDRED, and remembering to utilize his inspire therapy regularly.    At this time, he denies substantial symptoms of his restless leg syndrome; will continue to monitor this closely and determine if medication changes are needed in the future.  He will Return in about 1 month (around 9/29/2024).     Inspire Settings:  Outgoing Settings  Electrode configuration A + - +   Amplitude (V) 2.6  Range (V) 2.4 to 3.4  Pulse width (us)/ Rate (Hz)  90/33  Start delay (min) 30  Pause Time (min) 15  Therapy Duration (hr) 8      Sleep Studies:  -HSAT 1/12/2023: TRT: 63.8 minutes, FANNIE: 8.6, O2 pio: 89%     -HSAT 8/25/2022: TRT: 6 hours 57 minutes, FANNIE: 25.3, O2 pio: 86% 32 central/mixed apneas of 174 events.    -Inspire Titration Study 1/27/2022: Effective titration with AHI of 2 at settings of 3.2-3.4 V on electrode configuration 8.    -Note: Baseline AHI 30 on control night study.  Additional documentation stating an AHI of 23.7 in 2021.    ________________________________________________________________________________________________      Interval History: Jose Becker is a 69 y.o. male with a PMHx of CAD status post STEMI status post PTCA, HTN, cervical radiculopathy, macular degeneration, HLD, RLS, PLMD who presents in follow up for MILDRED (baseline AHI 23.7 in 2021) status post inspire implantation, activation 10/2021.    Inspire Hx  -Primary symptoms of concern the lead to inspire: Snoring, not a lot of symptoms before such as EDS.  -He has a history of moderate obstructive sleep apnea, AHI 23.7 diagnosed in 2021.      -His device was first activated in October 2021 at 0.8 V on  "configuration B, off minus off.    -At a follow-up visit with Dr. Darling, it seems that patient was changed to electrode configuration a at the range of 2 to 3 V.    -At a follow-up visit with Dr. Deshpande, the device was further titrated on configuration A.   -He had an inspire fine-tune sleep study in January 2022-that test showed an effective result with an AHI of 2 at the setting of 3.2 to 3.4 V on electrode configuration A..    -In a follow-up visit after the test, he was maintained on the current settings.  May 2022-notes describe some issues with tongue movement and discomfort, was at 2.8 V.    -Sleep test in May 2022 showed a respiratory event index of 25.3, there were 32 central/mixed apneas out of 174 events.    -Follow-up after that test, it was discussed that he had intolerance of higher voltage settings on configuration a and the patient was changed to configuration B at 1.3 V.  The patient had some symptoms of right-sided neck pain and back pain which she felt was attributable to the inspire device prior to this change.    -December 2022-due to discomfort, pulse width and rate was changed to 60/40 configuration B.    -A subsequent home sleep test in January 2023 showed a respiratory event index of 8.6, events much worse in the supine position, normal oxygenation.  Was at 1.3 volts?    -At his visit in February 2024, he was adjusted to 1.1 V with a pulse width/rate combination of 120/40.    -At his visit in March 2024, describe waking up with discomfort from the device, was changed to 120/33, 2.2 V.    -At a follow-up in April 2024, he had concern the device was not functioning properly.  Waveforms and impedance were checked and were normal.  -At his visit in June 2024, he expressed concern that the device was \"turning off in the middle of the night\" and not working properly.  Was not using it consistently as a result of this, stating that he felt the tongue stimulation when he first started the night but " "awakens in the middle of the night and did not feel it.  Reassurance was provided and he was encouraged to use it more consistently, with plans for a repeat HSAT at his subsequent appointment to verify efficacy.  -2024 -reverted back to Default configuration (A, plus minus plus) and pulse width/rate (90/33), started amplitude of 2.6.  Plan for retitration study in ~2 months.        At today's visit:    Upon review of his usage report, he used it consistently from his last visit up until 2024, when his usage became much less frequent. Has not increased the level.     He states that he had increased stress due to 2 deaths in the family (his father and his wife's 's father). He endorses again that he is experiencing the \"jolt\" or \"fishhook sensation\" in his lower jaw. He is quite adamant that he gets benefit from the device when he uses it consistently - more awake, more coherent during the day.     He had X-Rays verifying placement of the Inspire leads last week, apparently ordered by his urologist, for clearance. Of note, he does have an IPG (generator) is compatible for full-body MRI.     When 2.6, 2.7, and 2.8 Volts were tested, each looked better than the last. We    Inspire Settings  Incoming Settings  Electrode configuration A + - +   Amplitude (V) 2.6  Range (V) 2.4 to 3.4  Pulse width (us)/ Rate (Hz)  90/33  Start delay (min) 30  Pause Time (min) 15  Therapy Duration (hr) 8      Iron panel 2024:  Ferritin: 183  Iron saturation: 44%  TIBC: 286  Iron: 127        [place] Sleepiness Scale:  What are your chances of dozing?   0= no chance  1= slight chance  2= moderate chance  3= high chance    Sitting and readin   Watching TV: 1  Sitting, inactive in a public place (e.g. a theatre or a meeting):1  As a passenger in a car for an hour without a break: 1  Lying down to rest in the afternoon when circumstances permit: 3   Sitting and talking to someone: 1  Sitting quietly after a lunch without " alcohol: 1  In a car, while stopped for a few minutes in the traffic: 0       TOTAL  10/24  Greater or equal to 10 is positive for excessive daytime sleepiness        SLEEP HYGIENE QUESTIONS:  Bedtime: 2200   Time it takes to fall sleep: <15 minutes  Wake up Time: 0800   Number of times patient wakes up per night: 3-4  Naps: 0  Estimated total sleep time ( in a 24 hour period of time): ~6 hours       SLEEP RELATED ROS  Review of Systems  Allergies   Allergen Reactions    Sulfa Antibiotics     Clarithromycin Rash    Penicillins Hives and Rash       CURRENT MEDICATIONS:  Current Outpatient Medications   Medication Instructions    albuterol (2.5 mg/3 mL) 0.083 % nebulizer solution inhale contents of 1 vial ( 3 milliliters ) in nebulizer by mouth...  (REFER TO PRESCRIPTION NOTES).    albuterol (PROVENTIL HFA,VENTOLIN HFA) 90 mcg/act inhaler 1-2 puffs, Inhalation    Alirocumab (PRALUENT SC)     aspirin 81 MG tablet 1 tablet, Oral, Daily    budesonide-formoterol (Symbicort) 160-4.5 mcg/act inhaler Inhale by inhalation route for 90 days.    chlorhexidine (PERIDEX) 0.12 % solution     Cholecalciferol 50 MCG (2000 UT) CAPS Daily    clindamycin (CLEOCIN) 300 MG capsule     clopidogrel (PLAVIX) 75 mg tablet clopidogrel 75 mg tablet    DULoxetine (CYMBALTA) 30 mg delayed release capsule Take by oral route for 30 days.    fluticasone-salmeterol (Advair) 250-50 mcg/dose inhaler 1 puff, Inhalation, 2 times daily    Fluticasone-Salmeterol (Advair) 500-50 mcg/dose inhaler     gabapentin (NEURONTIN) 300 mg capsule     Gabapentin, Once-Daily, 600 MG TABS Take by oral route for 90 days.    levothyroxine 100 mcg    metoprolol succinate (TOPROL-XL) 25 mg 24 hr tablet Toprol XL 25 mg tablet,extended release    mometasone (ELOCON) 0.1 % lotion Topical, As needed, 2 drops to each ear canal as needed    montelukast (SINGULAIR) 10 mg tablet montelukast 10 mg tablet    nitroglycerin (NITROSTAT) 0.4 mg SL tablet     OMEGA-3 FATTY ACIDS PO 360mg     omeprazole (PriLOSEC) 20 mg delayed release capsule     oxyCODONE-acetaminophen (PERCOCET) 5-325 mg per tablet     Praluent 75 MG/ML SOAJ 1 mL, Subcutaneous, Every 14 days    Repatha SureClick 140 MG/ML SOAJ No dose, route, or frequency recorded.    SODIUM FLUORIDE, DENTAL GEL, 1.1 % GEL PreviDent 5000 Booster Plus 1.1 % dental paste   BRUSH 1 TIME DAILY PREFERABLY BEFORE BED, DO NOT RINSE AFTER USE    tadalafil (CIALIS) 5 mg, Oral, Daily    tiotropium (Spiriva Respimat) 1.25 MCG/ACT AERS inhaler Spiriva Respimat 1.25 mcg/actuation solution for inhalation   2 puffs daily    tiotropium (Spiriva Respimat) 1.25 MCG/ACT AERS inhaler 2 puffs daily           PHYSICAL EXAMINATION:  Vital Signs: There were no vitals taken for this visit.    Constitutional: NAD, well appearing   Mental Status: AAOx3  Skin: Warm, dry, no rashes noted   Eyes: PERRL, normal conjunctiva  Posterior Airspace:   [place] Tongue Position: 4  Retrognathia: absent  Overbite: absent  Tongue Scalloping/Ridging: absent  Uvula: normal  Chest: No evidence of respiratory distress, no accessory muscle use; no evidence of peripheral cyanosis  Abdomen: Soft, NT/ND  Extremities: No digital clubbing or pedal edema  Neuro: Strength 5/5 throughout, sensation grossly intact          Electronically signed by:    Bucky Bob DO  Board-Certified Neurology and Sleep Medicine  [facility]  09/26/24

## 2024-10-25 ENCOUNTER — PREP FOR PROCEDURE (OUTPATIENT)
Dept: UROLOGY | Facility: MEDICAL CENTER | Age: 69
End: 2024-10-25

## 2024-11-06 DIAGNOSIS — N52.9 ERECTILE DYSFUNCTION, UNSPECIFIED ERECTILE DYSFUNCTION TYPE: ICD-10-CM

## 2024-11-07 RX ORDER — TADALAFIL 5 MG/1
5 TABLET ORAL DAILY
Qty: 90 TABLET | Refills: 0 | Status: SHIPPED | OUTPATIENT
Start: 2024-11-07

## 2024-11-14 ENCOUNTER — OFFICE VISIT (OUTPATIENT)
Dept: SLEEP CENTER | Facility: CLINIC | Age: 69
End: 2024-11-14
Payer: MEDICARE

## 2024-11-14 VITALS
HEIGHT: 71 IN | BODY MASS INDEX: 29.01 KG/M2 | DIASTOLIC BLOOD PRESSURE: 72 MMHG | WEIGHT: 207.2 LBS | SYSTOLIC BLOOD PRESSURE: 113 MMHG | RESPIRATION RATE: 16 BRPM | HEART RATE: 66 BPM | TEMPERATURE: 98.2 F | OXYGEN SATURATION: 98 %

## 2024-11-14 DIAGNOSIS — G25.81 RESTLESS LEG SYNDROME: ICD-10-CM

## 2024-11-14 DIAGNOSIS — G47.33 OSA (OBSTRUCTIVE SLEEP APNEA): Primary | ICD-10-CM

## 2024-11-14 DIAGNOSIS — Z96.82 S/P INSERTION OF HYPOGLOSSAL NERVE STIMULATOR: ICD-10-CM

## 2024-11-14 PROCEDURE — G2211 COMPLEX E/M VISIT ADD ON: HCPCS | Performed by: STUDENT IN AN ORGANIZED HEALTH CARE EDUCATION/TRAINING PROGRAM

## 2024-11-14 PROCEDURE — 99214 OFFICE O/P EST MOD 30 MIN: CPT | Performed by: STUDENT IN AN ORGANIZED HEALTH CARE EDUCATION/TRAINING PROGRAM

## 2024-11-14 RX ORDER — TIOTROPIUM BROMIDE INHALATION SPRAY 1.56 UG/1
SPRAY, METERED RESPIRATORY (INHALATION)
COMMUNITY

## 2024-11-14 RX ORDER — LIDOCAINE 50 MG/G
1 PATCH TOPICAL EVERY 24 HOURS
COMMUNITY
Start: 2024-10-02 | End: 2025-10-02

## 2024-11-14 RX ORDER — GABAPENTIN 300 MG/1
1 CAPSULE ORAL EVERY 8 HOURS
COMMUNITY
Start: 2024-08-28

## 2024-11-14 NOTE — PROGRESS NOTES
"[facility]  Sleep Medicine Follow up/ Established Patient Visit      Assessment/Plan:  1. MILDRED (obstructive sleep apnea)        2. S/P insertion of hypoglossal nerve stimulator        3. Restless leg syndrome            Jose is a pleasant 68-year-old gentleman with a PMHx of CAD status post STEMI status post PTCA, HTN, cervical radiculopathy, macular degeneration, HLD, RLS, PLMD who presents in follow up for MILDRED (baseline AHI 23.7 in 2021) status post inspire implantation, activation 10/2021.  He is doing slightly better with regards to the uncomfortable \"zapping\" type sensation in his right lower jaw; namely, it sounds as though if he utilizes the pause function when this happens, he typically will fall back asleep and sleep through the night.  His largest difficulty right now is moreso with remembering to turn the device on when he goes to bed.    Will have him continue the \"pause\" strategy to help deal with this subjective \"zapping\" sensation; of note, he did not endorse any of this sensation during stimulus testing today.  Discussed strategies to help him remember to utilize the device, including placing a note by his light reminding him to do so as well as setting an alarm for the same time every night.  Given some ongoing delay in falling asleep, we will increase his start delay to 45  For now, we will have him keep the same level for the next week or so, then next week on Thursday, increase the setting by 1 level for ~2 weeks, before trying to go up 1 more level.  Would like to undergo a retitration study, however this will be dependent on his status at his next appointment.  Reviewed the importance of treating MILDRED, and remembering to utilize his inspire therapy regularly.    At this time, he denies substantial symptoms of his restless leg syndrome; will continue to monitor this closely and determine if medication changes are needed in the future.  He will Return in about 5 weeks " "(around 12/19/2024).      Inspire Settings:  Outgoing Settings  Electrode configuration A + - +   Amplitude (V) 2.6  Range (V) 2.4 to 3.4  Pulse width (us)/ Rate (Hz)  90/33  Start delay (min) 45  Pause Time (min) 30  Therapy Duration (hr) 8    ________________________________________________________________________________________________    Per Last Visit Note (Date: 8/29/2024):  Jose is a pleasant 68-year-old gentleman with a PMHx of CAD status post STEMI status post PTCA, HTN, cervical radiculopathy, macular degeneration, HLD, RLS, PLMD who presents in follow up for MILDRED (baseline AHI 23.7 in 2021) status post inspire implantation, activation 10/2021.  Unfortunately, we seem to have a similar pattern at today's visit as what was dealt with in the past; namely, despite excellent efficacy and lack of symptoms at his current settings at his last appointment, he now tells me that over the past several weeks he has had a recurrence of this \"fishhook/shock\" type sensation in his right lower jaw.  Oddly, he was doing very well for the first ~1 to 2 weeks after his appointment, then abruptly stopped/drastically reduced his use due to the symptoms, all of which is reflected in the Inspire use data that I reviewed today.  These settings were then tested extensively at his visit today, and interestingly, he had none of that sensation.  As such, I have a higher suspicion that this is more of essentially a psychological/stress-induced sensitivity to the stimulus than an abnormality in the hardware itself.  He does also sound to essentially give up on the therapy if he wakes in the middle of the weight with that sensation, really lysing his dysfunction.     I recommended he continue at his current settings, as these generally were very effective for him in the past.  I encouraged him to try utilizing the pause function overnight, to see if that calms the sensation enough that he can fall back asleep and then continue to use " it throughout the rest of the night.  To help facilitate this, we have increased the pause time to 30 minutes  Would like to undergo a retitration study, however this will be dependent on his status at his next appointment.  Reviewed the process of attempting to increase voltage by 1 setting weekly.  Reviewed the importance of treating MILDRED, and remembering to utilize his inspire therapy regularly.    At this time, he denies substantial symptoms of his restless leg syndrome; will continue to monitor this closely and determine if medication changes are needed in the future.  He does also need a new card showing information (including MRI compatibility, etc.); this will be pursued by the Inspire representative.  He will Return for Follow-up in 1-2 months.        Inspire Settings:  Outgoing Settings  Electrode configuration A + - +   Amplitude (V) 2.6  Range (V) 2.4 to 3.4  Pulse width (us)/ Rate (Hz)  90/33  Start delay (min) 30  Pause Time (min) 30  Therapy Duration (hr) 8      Sleep Studies:  -HSAT 1/12/2023: TRT: 63.8 minutes, FANNIE: 8.6, O2 pio: 89%     -HSAT 8/25/2022: TRT: 6 hours 57 minutes, FANNIE: 25.3, O2 pio: 86% 32 central/mixed apneas of 174 events.    -Inspire Titration Study 1/27/2022: Effective titration with AHI of 2 at settings of 3.2-3.4 V on electrode configuration 8.    -Note: Baseline AHI 30 on control night study.  Additional documentation stating an AHI of 23.7 in 2021.    ________________________________________________________________________________________________      Interval History: Joes Becker is a 69 y.o. male with a PMHx of CAD status post STEMI status post PTCA, HTN, cervical radiculopathy, macular degeneration, HLD, RLS, PLMD who presents in follow up for MILDRED (baseline AHI 23.7 in 2021) status post inspire implantation, activation 10/2021.    Inspire Hx  -Primary symptoms of concern the lead to inspire: Snoring, not a lot of symptoms before such as EDS.  -He has a history of  "moderate obstructive sleep apnea, AHI 23.7 diagnosed in 2021.      -His device was first activated in October 2021 at 0.8 V on configuration B, off minus off.    -At a follow-up visit with Dr. Darling, it seems that patient was changed to electrode configuration a at the range of 2 to 3 V.    -At a follow-up visit with Dr. Deshpande, the device was further titrated on configuration A.   -He had an inspire fine-tune sleep study in January 2022-that test showed an effective result with an AHI of 2 at the setting of 3.2 to 3.4 V on electrode configuration A..    -In a follow-up visit after the test, he was maintained on the current settings.  May 2022-notes describe some issues with tongue movement and discomfort, was at 2.8 V.    -Sleep test in May 2022 showed a respiratory event index of 25.3, there were 32 central/mixed apneas out of 174 events.    -Follow-up after that test, it was discussed that he had intolerance of higher voltage settings on configuration a and the patient was changed to configuration B at 1.3 V.  The patient had some symptoms of right-sided neck pain and back pain which she felt was attributable to the inspire device prior to this change.    -December 2022-due to discomfort, pulse width and rate was changed to 60/40 configuration B.    -A subsequent home sleep test in January 2023 showed a respiratory event index of 8.6, events much worse in the supine position, normal oxygenation.  Was at 1.3 volts?    -At his visit in February 2024, he was adjusted to 1.1 V with a pulse width/rate combination of 120/40.    -At his visit in March 2024, describe waking up with discomfort from the device, was changed to 120/33, 2.2 V.    -At a follow-up in April 2024, he had concern the device was not functioning properly.  Waveforms and impedance were checked and were normal.  -At his visit in June 2024, he expressed concern that the device was \"turning off in the middle of the night\" and not working properly.  Was " "not using it consistently as a result of this, stating that he felt the tongue stimulation when he first started the night but awakens in the middle of the night and did not feel it.  Reassurance was provided and he was encouraged to use it more consistently, with plans for a repeat HSAT at his subsequent appointment to verify efficacy.  -2024 -reverted back to Default configuration (A, plus minus plus) and pulse width/rate (90/33), started amplitude of 2.6.  Plan for retitration study in ~2 months.        At today's visit:    He tells me today that he thinks if he uses it every day, he maybe feels a little more refreshed, but not drastically so.     Says that there are frequent nights when he lays down and falls asleep and forgets to turn it on.  This is supported upon review of his inspire usage data.    Says that he tried pausing it, seemed to work, particularly from the standpoint of the uncomfortable \"zapping\" sensation; when asked, he is fairly adamant that if he simply paused it after that sensation woke him up, he does not recall waking up again the rest of the night, and had typically used it throughout the night.        Inspire Settings  Incoming Settings  Electrode configuration A + - +   Amplitude (V) 2.6  Range (V) 2.4 to 3.4  Pulse width (us)/ Rate (Hz)  90/33  Start delay (min) 30  Pause Time (min) 15  Therapy Duration (hr) 8      Iron panel 2024:  Ferritin: 183  Iron saturation: 44%  TIBC: 286  Iron: 127        [place] Sleepiness Scale:  What are your chances of dozing?   0= no chance  1= slight chance  2= moderate chance  3= high chance    Sitting and readin   Watching TV: 1  Sitting, inactive in a public place (e.g. a theatre or a meeting):1  As a passenger in a car for an hour without a break: 1  Lying down to rest in the afternoon when circumstances permit: 3   Sitting and talking to someone: 1  Sitting quietly after a lunch without alcohol: 1  In a car, while stopped for a few minutes " in the traffic: 0       TOTAL  10/24  Greater or equal to 10 is positive for excessive daytime sleepiness        SLEEP HYGIENE QUESTIONS:  Bedtime: 2200; sometimes goes to bed earlier if really tired.   Time it takes to fall sleep: <15 minutes  Wake up Time: 0800   Number of times patient wakes up per night: 3-4  Naps: 0  Estimated total sleep time ( in a 24 hour period of time): ~6 hours       SLEEP RELATED ROS  Review of Systems  Allergies   Allergen Reactions    Sulfa Antibiotics Hives    Clarithromycin Rash    Penicillins Hives and Rash       CURRENT MEDICATIONS:  Current Outpatient Medications   Medication Instructions    Alirocumab (PRALUENT SC)     aspirin 81 MG tablet 1 tablet, Daily    budesonide-formoterol (Symbicort) 160-4.5 mcg/act inhaler Inhale by inhalation route for 90 days.    chlorhexidine (PERIDEX) 0.12 % solution     Cholecalciferol 50 MCG (2000 UT) CAPS Daily    clindamycin (CLEOCIN) 300 MG capsule     clopidogrel (PLAVIX) 75 mg tablet clopidogrel 75 mg tablet    DULoxetine (CYMBALTA) 30 mg delayed release capsule Take by oral route for 30 days.    gabapentin (NEURONTIN) 300 mg capsule     gabapentin (NEURONTIN) 300 mg capsule 1 capsule, Oral, Every 8 hours    levothyroxine 100 mcg    lidocaine (LIDODERM) 5 % 1 patch, Transdermal, Every 24 hours    metoprolol succinate (TOPROL-XL) 25 mg 24 hr tablet Toprol XL 25 mg tablet,extended release    mometasone (ELOCON) 0.1 % lotion Topical, As needed, 2 drops to each ear canal as needed    montelukast (SINGULAIR) 10 mg tablet montelukast 10 mg tablet    nitroglycerin (NITROSTAT) 0.4 mg SL tablet     Omega-3 Fatty Acids (OMEGA-3 FISH OIL PO) 1 capsule every day by oral route.    OMEGA-3 FATTY ACIDS PO 360mg    omeprazole (PriLOSEC) 20 mg delayed release capsule     tadalafil (CIALIS) 5 mg, Oral, Daily    tiotropium (Spiriva Respimat) 1.25 MCG/ACT AERS inhaler            PHYSICAL EXAMINATION:  Vital Signs: /72   Pulse 66   Temp 98.2 °F (36.8 °C)   " Resp 16   Ht 5' 11\" (1.803 m)   Wt 94 kg (207 lb 3.2 oz)   SpO2 98%   BMI 28.90 kg/m²     Constitutional: NAD, well appearing   Mental Status: AAOx3  Skin: Warm, dry, no rashes noted   Eyes: PERRL, normal conjunctiva  Posterior Airspace:   M[place]ain Tongue Position: 4  Retrognathia: absent  Overbite: absent  Tongue Scalloping/Ridging: absent  Uvula: normal  Chest: No evidence of respiratory distress, no accessory muscle use; no evidence of peripheral cyanosis  Abdomen: Soft, NT/ND  Extremities: No digital clubbing or pedal edema  Neuro: Strength 5/5 throughout, sensation grossly intact          Electronically signed by:    Bucky Bob DO  Board-Certified Neurology and Sleep Medicine  [facility]  11/14/24      "

## 2024-11-14 NOTE — PATIENT INSTRUCTIONS
-Keep the same level for the next 1 weeks or so, then next week on Thursday, increase the Inspire remote setting by one level for 2 weeks, then go up one more level. You are on Level 3 today  -If you experience discomfort and this disturbs her sleep or prevents an increase in the levels, please let me know.  If this occurs, a follow-up visit may be needed to change some comfort settings with the inspire device.     -Try utilizing the PAUSE function if it wakes you in the middle of the night, instead of fully turning it off!  -We are increasing the start delay slightly  -Try placing a reminder by your light to turn the device on  -If that doesn't work, consider using an alarm clock for a set bedtime to remind you to turn it on.         Please remember you must turn off (White button) or pause the Inspire device anytime you are eating, drinking, chewing, or swallowing as there is a risk of choking if you do so when the Inspire device is active.      Please bring your remote to each visit with me     If you have device questions in the middle of the night, please call Inspire support  ([phone])      For problems using Inspire, sleep issues, etc, please contact me in the office in the day or send a message via [facility]

## 2024-11-27 ENCOUNTER — OFFICE VISIT (OUTPATIENT)
Dept: LAB | Facility: HOSPITAL | Age: 69
End: 2024-11-27
Payer: MEDICARE

## 2024-11-27 ENCOUNTER — APPOINTMENT (OUTPATIENT)
Dept: LAB | Facility: HOSPITAL | Age: 69
End: 2024-11-27
Payer: MEDICARE

## 2024-11-27 DIAGNOSIS — Z01.810 PRE-OPERATIVE CARDIOVASCULAR EXAMINATION: ICD-10-CM

## 2024-11-27 DIAGNOSIS — R97.20 ELEVATED PSA: ICD-10-CM

## 2024-11-27 DIAGNOSIS — N42.9 DISORDER OF PROSTATE, UNSPECIFIED: ICD-10-CM

## 2024-11-27 DIAGNOSIS — R39.89 SUSPECTED UTI: ICD-10-CM

## 2024-11-27 DIAGNOSIS — E78.5 HYPERLIPIDEMIA, UNSPECIFIED HYPERLIPIDEMIA TYPE: ICD-10-CM

## 2024-11-27 DIAGNOSIS — R63.4 WEIGHT LOSS: ICD-10-CM

## 2024-11-27 DIAGNOSIS — R97.20 ELEVATED PROSTATE SPECIFIC ANTIGEN (PSA): ICD-10-CM

## 2024-11-27 DIAGNOSIS — Z01.812 PRE-OPERATIVE LABORATORY EXAMINATION: ICD-10-CM

## 2024-11-27 LAB
25(OH)D3 SERPL-MCNC: 62.2 NG/ML (ref 30–100)
ALBUMIN SERPL BCG-MCNC: 4.1 G/DL (ref 3.5–5)
ALP SERPL-CCNC: 86 U/L (ref 34–104)
ALT SERPL W P-5'-P-CCNC: 10 U/L (ref 7–52)
ANION GAP SERPL CALCULATED.3IONS-SCNC: 4 MMOL/L (ref 4–13)
AST SERPL W P-5'-P-CCNC: 14 U/L (ref 13–39)
ATRIAL RATE: 61 BPM
BASOPHILS # BLD AUTO: 0.04 THOUSANDS/ΜL (ref 0–0.1)
BASOPHILS NFR BLD AUTO: 1 % (ref 0–1)
BILIRUB SERPL-MCNC: 0.78 MG/DL (ref 0.2–1)
BILIRUB UR QL STRIP: NEGATIVE
BNP SERPL-MCNC: 49 PG/ML (ref 0–100)
BUN SERPL-MCNC: 12 MG/DL (ref 5–25)
CALCIUM SERPL-MCNC: 9 MG/DL (ref 8.4–10.2)
CHLORIDE SERPL-SCNC: 105 MMOL/L (ref 96–108)
CHOLEST SERPL-MCNC: 118 MG/DL (ref ?–200)
CLARITY UR: CLEAR
CO2 SERPL-SCNC: 30 MMOL/L (ref 21–32)
COLOR UR: ABNORMAL
CREAT SERPL-MCNC: 0.76 MG/DL (ref 0.6–1.3)
EOSINOPHIL # BLD AUTO: 0.2 THOUSAND/ΜL (ref 0–0.61)
EOSINOPHIL NFR BLD AUTO: 4 % (ref 0–6)
ERYTHROCYTE [DISTWIDTH] IN BLOOD BY AUTOMATED COUNT: 12.9 % (ref 11.6–15.1)
EST. AVERAGE GLUCOSE BLD GHB EST-MCNC: 103 MG/DL
GFR SERPL CREATININE-BSD FRML MDRD: 92 ML/MIN/1.73SQ M
GLUCOSE P FAST SERPL-MCNC: 110 MG/DL (ref 65–99)
GLUCOSE UR STRIP-MCNC: NEGATIVE MG/DL
HBA1C MFR BLD: 5.2 %
HCT VFR BLD AUTO: 47.1 % (ref 36.5–49.3)
HDLC SERPL-MCNC: 41 MG/DL
HGB BLD-MCNC: 15.6 G/DL (ref 12–17)
HGB UR QL STRIP.AUTO: NEGATIVE
IMM GRANULOCYTES # BLD AUTO: 0.01 THOUSAND/UL (ref 0–0.2)
IMM GRANULOCYTES NFR BLD AUTO: 0 % (ref 0–2)
KETONES UR STRIP-MCNC: NEGATIVE MG/DL
LDLC SERPL CALC-MCNC: 56 MG/DL (ref 0–100)
LEUKOCYTE ESTERASE UR QL STRIP: NEGATIVE
LYMPHOCYTES # BLD AUTO: 1.49 THOUSANDS/ΜL (ref 0.6–4.47)
LYMPHOCYTES NFR BLD AUTO: 27 % (ref 14–44)
MCH RBC QN AUTO: 29.7 PG (ref 26.8–34.3)
MCHC RBC AUTO-ENTMCNC: 33.1 G/DL (ref 31.4–37.4)
MCV RBC AUTO: 90 FL (ref 82–98)
MONOCYTES # BLD AUTO: 0.54 THOUSAND/ΜL (ref 0.17–1.22)
MONOCYTES NFR BLD AUTO: 10 % (ref 4–12)
NEUTROPHILS # BLD AUTO: 3.16 THOUSANDS/ΜL (ref 1.85–7.62)
NEUTS SEG NFR BLD AUTO: 58 % (ref 43–75)
NITRITE UR QL STRIP: NEGATIVE
NONHDLC SERPL-MCNC: 77 MG/DL
NRBC BLD AUTO-RTO: 0 /100 WBCS
P AXIS: 70 DEGREES
PH UR STRIP.AUTO: 6.5 [PH]
PLATELET # BLD AUTO: 179 THOUSANDS/UL (ref 149–390)
PMV BLD AUTO: 9.8 FL (ref 8.9–12.7)
POTASSIUM SERPL-SCNC: 4.2 MMOL/L (ref 3.5–5.3)
PR INTERVAL: 164 MS
PROT SERPL-MCNC: 6.6 G/DL (ref 6.4–8.4)
PROT UR STRIP-MCNC: NEGATIVE MG/DL
QRS AXIS: -12 DEGREES
QRSD INTERVAL: 104 MS
QT INTERVAL: 420 MS
QTC INTERVAL: 423 MS
RBC # BLD AUTO: 5.26 MILLION/UL (ref 3.88–5.62)
SODIUM SERPL-SCNC: 139 MMOL/L (ref 135–147)
SP GR UR STRIP.AUTO: 1.02
T WAVE AXIS: 61 DEGREES
T4 FREE SERPL-MCNC: 1.05 NG/DL (ref 0.61–1.12)
TRIGL SERPL-MCNC: 107 MG/DL (ref ?–150)
TSH SERPL DL<=0.05 MIU/L-ACNC: 1.78 UIU/ML (ref 0.45–4.5)
UROBILINOGEN UR QL STRIP.AUTO: 0.2 E.U./DL
VENTRICULAR RATE: 61 BPM
WBC # BLD AUTO: 5.44 THOUSAND/UL (ref 4.31–10.16)

## 2024-11-27 PROCEDURE — 93005 ELECTROCARDIOGRAM TRACING: CPT

## 2024-11-27 PROCEDURE — 84443 ASSAY THYROID STIM HORMONE: CPT

## 2024-11-27 PROCEDURE — 83880 ASSAY OF NATRIURETIC PEPTIDE: CPT

## 2024-11-27 PROCEDURE — 84439 ASSAY OF FREE THYROXINE: CPT

## 2024-11-27 PROCEDURE — 87086 URINE CULTURE/COLONY COUNT: CPT

## 2024-11-27 PROCEDURE — 80061 LIPID PANEL: CPT

## 2024-11-27 PROCEDURE — 82306 VITAMIN D 25 HYDROXY: CPT

## 2024-11-27 PROCEDURE — 80053 COMPREHEN METABOLIC PANEL: CPT

## 2024-11-27 PROCEDURE — 81003 URINALYSIS AUTO W/O SCOPE: CPT

## 2024-11-27 PROCEDURE — 85025 COMPLETE CBC W/AUTO DIFF WBC: CPT

## 2024-11-27 PROCEDURE — 36415 COLL VENOUS BLD VENIPUNCTURE: CPT

## 2024-11-27 PROCEDURE — 83036 HEMOGLOBIN GLYCOSYLATED A1C: CPT

## 2024-11-27 PROCEDURE — 93010 ELECTROCARDIOGRAM REPORT: CPT | Performed by: INTERNAL MEDICINE

## 2024-11-28 LAB — BACTERIA UR CULT: NORMAL

## 2024-12-05 ENCOUNTER — ANESTHESIA EVENT (OUTPATIENT)
Dept: PERIOP | Facility: HOSPITAL | Age: 69
End: 2024-12-05
Payer: MEDICARE

## 2024-12-05 NOTE — PRE-PROCEDURE INSTRUCTIONS
Pre-Surgery Instructions:   Medication Instructions    aspirin 81 MG tablet Take day of surgery.    Cholecalciferol 50 MCG (2000 UT) CAPS Stop taking 7 days prior to surgery.    clopidogrel (PLAVIX) 75 mg tablet Stop taking 7 days prior to surgery. Per cards    gabapentin (NEURONTIN) 300 mg capsule Take day of surgery.    levothyroxine 100 mcg tablet Take day of surgery.    metoprolol succinate (TOPROL-XL) 25 mg 24 hr tablet Take day of surgery.    montelukast (SINGULAIR) 10 mg tablet Take night before surgery    Omega-3 Fatty Acids (OMEGA-3 FISH OIL PO) Stop taking 7 days prior to surgery.    omeprazole (PriLOSEC) 20 mg delayed release capsule Take day of surgery.    tadalafil (CIALIS) 5 MG tablet Hold day of surgery.      Medication instructions for day surgery reviewed. Please use only a sip of water to take your instructed medications. Avoid all over the counter vitamins, supplements and NSAIDS for one week prior to surgery per anesthesia guidelines. Tylenol is ok to take as needed.     You will receive a call one business day prior to surgery with an arrival time and hospital directions. If your surgery is scheduled on a Monday, the hospital will be calling you on the Friday prior to your surgery. If you have not heard from anyone by 8pm, please call the hospital supervisor through the hospital  at [phone]. ([place] [phone] or [place] [phone]).    Do not eat or drink anything after midnight the night before your surgery, including candy, mints, lifesavers, or chewing gum. Do not drink alcohol 24hrs before your surgery. Try not to smoke at least 24hrs before your surgery.       Follow the pre surgery showering instructions as listed in the “My Surgical Experience Booklet” or otherwise provided by your surgeon's office. Do not use a blade to shave the surgical area 1 week before surgery. It is okay to use a clean electric clippers up to 24 hours before surgery. Do not apply any lotions,  creams, including makeup, cologne, deodorant, or perfumes after showering on the day of your surgery. Do not use dry shampoo, hair spray, hair gel, or any type of hair products.     No contact lenses, eye make-up, or artificial eyelashes. Remove nail polish, including gel polish, and any artificial, gel, or acrylic nails if possible. Remove all jewelry including rings and body piercing jewelry.     Wear causal clothing that is easy to take on and off. Consider your type of surgery.    Keep any valuables, jewelry, piercings at home. Please bring any specially ordered equipment (sling, braces) if indicated.    Arrange for a responsible person to drive you to and from the hospital on the day of your surgery. Please confirm the visitor policy for the day of your procedure when you receive your phone call with an arrival time.     Call the surgeon's office with any new illnesses, exposures, or additional questions prior to surgery.    Please reference your “My Surgical Experience Booklet” for additional information to prepare for your upcoming surgery.

## 2024-12-11 ENCOUNTER — ANESTHESIA (OUTPATIENT)
Dept: PERIOP | Facility: HOSPITAL | Age: 69
End: 2024-12-11
Payer: MEDICARE

## 2024-12-11 ENCOUNTER — HOSPITAL ENCOUNTER (OUTPATIENT)
Facility: HOSPITAL | Age: 69
Setting detail: OUTPATIENT SURGERY
Discharge: HOME | End: 2024-12-11
Payer: MEDICARE

## 2024-12-11 VITALS
RESPIRATION RATE: 16 BRPM | DIASTOLIC BLOOD PRESSURE: 66 MMHG | TEMPERATURE: 97.4 F | SYSTOLIC BLOOD PRESSURE: 120 MMHG | BODY MASS INDEX: 27.13 KG/M2 | WEIGHT: 193.78 LBS | OXYGEN SATURATION: 99 % | HEART RATE: 53 BPM | HEIGHT: 71 IN

## 2024-12-11 DIAGNOSIS — R97.20 ELEVATED PROSTATE SPECIFIC ANTIGEN (PSA): ICD-10-CM

## 2024-12-11 PROCEDURE — NC001 PR NO CHARGE

## 2024-12-11 PROCEDURE — 76942 ECHO GUIDE FOR BIOPSY: CPT

## 2024-12-11 PROCEDURE — G0416 PROSTATE BIOPSY, ANY MTHD: HCPCS | Performed by: PATHOLOGY

## 2024-12-11 PROCEDURE — 55700 PR PROSTATE NEEDLE BIOPSY ANY APPROACH: CPT

## 2024-12-11 RX ORDER — SODIUM CHLORIDE 9 MG/ML
125 INJECTION, SOLUTION INTRAVENOUS CONTINUOUS
Status: DISCONTINUED | OUTPATIENT
Start: 2024-12-11 | End: 2024-12-11 | Stop reason: HOSPADM

## 2024-12-11 RX ORDER — LIDOCAINE HYDROCHLORIDE 20 MG/ML
INJECTION, SOLUTION EPIDURAL; INFILTRATION; INTRACAUDAL; PERINEURAL AS NEEDED
Status: DISCONTINUED | OUTPATIENT
Start: 2024-12-11 | End: 2024-12-11

## 2024-12-11 RX ORDER — FENTANYL CITRATE 50 UG/ML
INJECTION, SOLUTION INTRAMUSCULAR; INTRAVENOUS AS NEEDED
Status: DISCONTINUED | OUTPATIENT
Start: 2024-12-11 | End: 2024-12-11

## 2024-12-11 RX ORDER — PROPOFOL 10 MG/ML
INJECTION, EMULSION INTRAVENOUS AS NEEDED
Status: DISCONTINUED | OUTPATIENT
Start: 2024-12-11 | End: 2024-12-11

## 2024-12-11 RX ORDER — LIDOCAINE HYDROCHLORIDE 20 MG/ML
INJECTION, SOLUTION EPIDURAL; INFILTRATION; INTRACAUDAL; PERINEURAL AS NEEDED
Status: DISCONTINUED | OUTPATIENT
Start: 2024-12-11 | End: 2024-12-11 | Stop reason: HOSPADM

## 2024-12-11 RX ORDER — METOPROLOL SUCCINATE 25 MG/1
25 TABLET, EXTENDED RELEASE ORAL ONCE
Status: COMPLETED | OUTPATIENT
Start: 2024-12-11 | End: 2024-12-11

## 2024-12-11 RX ADMIN — SODIUM CHLORIDE 125 ML/HR: 0.9 INJECTION, SOLUTION INTRAVENOUS at 07:43

## 2024-12-11 RX ADMIN — PROPOFOL 120 MCG/KG/MIN: 10 INJECTION, EMULSION INTRAVENOUS at 09:07

## 2024-12-11 RX ADMIN — PROPOFOL 50 MG: 10 INJECTION, EMULSION INTRAVENOUS at 09:06

## 2024-12-11 RX ADMIN — FENTANYL CITRATE 100 MCG: 50 INJECTION, SOLUTION INTRAMUSCULAR; INTRAVENOUS at 09:05

## 2024-12-11 RX ADMIN — LIDOCAINE HYDROCHLORIDE 100 MG: 20 INJECTION, SOLUTION EPIDURAL; INFILTRATION; INTRACAUDAL at 09:06

## 2024-12-11 RX ADMIN — METOPROLOL SUCCINATE 25 MG: 25 TABLET, EXTENDED RELEASE ORAL at 07:40

## 2024-12-11 NOTE — ANESTHESIA POSTPROCEDURE EVALUATION
Post-Op Assessment Note    CV Status:  Stable    Pain management: adequate       Mental Status:  Alert and awake   Hydration Status:  Euvolemic   PONV Controlled:  Controlled   Airway Patency:  Patent     Post Op Vitals Reviewed: Yes    No anethesia notable event occurred.    Staff: Anesthesiologist           Last Filed PACU Vitals:  Vitals Value Taken Time   Temp 97.2 °F (36.2 °C) 12/11/24 0933   Pulse 56 12/11/24 0959   /73 12/11/24 0948   Resp 11 12/11/24 0959   SpO2 98 % 12/11/24 0959   Vitals shown include unfiled device data.    Modified Valdo:  Activity: 2 (12/11/2024  9:48 AM)  Respiration: 2 (12/11/2024  9:48 AM)  Circulation: 2 (12/11/2024  9:48 AM)  Consciousness: 2 (12/11/2024  9:48 AM)  Oxygen Saturation: 2 (12/11/2024  9:48 AM)  Modified Valdo Score: 10 (12/11/2024  9:48 AM)

## 2024-12-11 NOTE — ASSESSMENT & PLAN NOTE
Elevated PSA with PIRADS 4 MRI.     - Proceed with transperineal MRI fusion prostate biopsy    No associated orders from this encounter found during lookback period of 72 hours.

## 2024-12-11 NOTE — H&P
"  Name: Jose Becker      : 1955      MRN: [id number]  Encounter Provider: No name on file  Encounter Date: 2024   Encounter department: [facility] OPERATING ROOM    Assessment & Plan  Elevated PSA  Elevated PSA with PIRADS 4 MRI.     - Proceed with transperineal MRI fusion prostate biopsy    No associated orders from this encounter found during lookback period of 72 hours.    History of Present Illness     Jose Becker is a 69 y.o. male who presents for prostate biopsy after MRI showed a PIRADS 4 lesion in his prostate. Patient follows with Dr. Ruiz.       History obtained from : patient        Objective     /73   Pulse 60   Temp (!) 97.3 °F (36.3 °C) (Temporal)   Resp 18   Ht 5' 11\" (1.803 m)   Wt 87.9 kg (193 lb 12.6 oz)   SpO2 98%   BMI 27.03 kg/m²   Physical Exam  Vitals:    24 0709   BP: 131/73   Pulse: 60   Resp: 18   Temp: (!) 97.3 °F (36.3 °C)   SpO2: 98%      General: Well appearing, no acute distress   HEENT: normocephalic, atraumatic  Eyes: extraocular movements intact  Cardiovascular: normal rate   Respiratory: no respiratory distress, effort normal   Abdominal: Non-distended, non-tender   : Deferred  MSK: Grossly normal range of motion   Neurological: No gross focal deficits  Behavioral: Normal mood and affect     Results  Lab Results   Component Value Date    PSA 4.027 (H) 2024    PSA 4.120 (H) 2024    PSA 2.5 2022     Lab Results   Component Value Date    CALCIUM 9.0 2024    K 4.2 2024    CO2 30 2024     2024    BUN 12 2024    CREATININE 0.76 2024     Lab Results   Component Value Date    WBC 5.44 2024    HGB 15.6 2024    HCT 47.1 2024    MCV 90 2024     2024       Imaging  I have personally reviewed pertinent films in PACS.    MRI PROSTATE MULTIPARAMETRIC WO W CONTRAST  1. PI-RADSv2.1 Category 4 - High (clinically significant cancer is likely " to be present). Left apical medial peripheral zone lesion.    2. No extraprostatic tumor, seminal vesicle invasion, pelvic lymphadenopathy, or pelvic osseous metastatic disease.    3. Calculated prostate volume of 23 mL.                  Workstation performed: [id number]      XR SPINE LUMBAR MINIMUM 4 VIEWS NON INJURY  Degenerative changes    No acute osseous abnormality      Workstation performed: [id number]      MRI LUMBAR SPINE WO CONTRAST  Spondylotic degenerative disease particularly L2-3 where there is moderate central stenosis.    No greater than mild central or foraminal narrowing at remaining levels.  Transitional lumbosacral junction.      Workstation performed: [id number]      Office Urine Dip  No results found for this or any previous visit (from the past hour).]    Administrative Statements

## 2024-12-11 NOTE — ANESTHESIA PREPROCEDURE EVALUATION
Procedure:  TRANSPERINEAL MRI FUSION BX PROSTATE (Perineum)    Relevant Problems   CARDIO   (+) CAD (coronary artery disease), native coronary artery   (+) History of coronary artery bypass graft   (+) Hypertensive disorder   (+) Mixed hyperlipidemia   (+) STEMI (ST elevation myocardial infarction) (HCC)      MUSCULOSKELETAL   (+) Chronic back pain   (+) Chronic low back pain      NEURO/PSYCH   (+) Chronic back pain   (+) Chronic low back pain      PULMONARY   (+) Moderate persistent asthma without complication   (+) MILDRED (obstructive sleep apnea)   (+) Smoking      Neurology/Sleep   (+) PLMD (periodic limb movement disorder)      Surgery/Wound/Pain   (+) Status post aorto-coronary artery bypass graft      Other   (+) S/P PTCA (percutaneous transluminal coronary angioplasty)   (+) S/P insertion of hypoglossal nerve stimulator   2022 Echo:    Technically challenging study.     Left ventricle is normal in size. Wall thickness is normal. Systolic function is normal with an ejection fraction of 60-65%. Wall motion is within normal limits. There is grade I (mild) diastolic dysfunction.     Right ventricle cavity is mildly dilated. Systolic function is normal.     No significant valve abnormalities.     Pulmonic Valve: Normal pulmonary artery pressure.      Post-inspire sleep study:  1) This study demonstrates increased number of respiratory events with FANNIE of 8.6 events per hour consistent with mild MILDRED although his FANNIE has improved compared to his pre-INSPIRE FANNIE of 23.7 events per hour. FANNIE was worse in supine position with supine FANNIE of 27.5 events per hour.   2) Normal baseline oxygen saturation with mild respiratory event related hypoxemia and lowest SpO2 of 88%.   3) Average pulse during the study was 70.6 beats per minute.        Physical Exam    Airway    Mallampati score: II  TM Distance: <3 FB       Dental    upper dentures    Cardiovascular  Rhythm: regular, Rate: normal    Pulmonary   Breath sounds clear to  auscultation    Other Findings        Anesthesia Plan  ASA Score- 2     Anesthesia Type- IV sedation with anesthesia with ASA Monitors.         Additional Monitors:     Airway Plan:     Comment: Inspire device in place, remote coming with patient  PO metoprolol given in SDS.       Plan Factors-Exercise tolerance (METS): >4 METS.    Chart reviewed.   Existing labs reviewed.     Patient is not a current smoker.      Obstructive sleep apnea risk education given perioperatively.        Induction- intravenous.    Postoperative Plan-         Informed Consent- Anesthetic plan and risks discussed with patient.

## 2024-12-11 NOTE — DISCHARGE INSTR - AVS FIRST PAGE
Mr. Jose Becker,      Your biopsy procedure went well.  I was able to obtain the samples in the areas targeted on your MRI and have sent these to Pathology for their review.  The results should be back in 5-7 business days.  There should be an appointment set up to discuss the results in person with Dr. Ruiz.    Some blood in the urine is normal for approximately 1 week after surgery.  It can last in the ejaculate for up to 1 month.    Most patients do not need prescription medications after this procedure (including no antibiotics or narcotic pain medications).  Please try taking Tylenol and Motrin over-the-counter if you have discomfort.  If you are having significant pain issues please contact me.    Please call us immediately if you are having fevers or chills or feel like you have a infection.    Some patients can have difficulty with voiding after a biopsy because of swelling of the prostate which can block the urethra.  This usually improves with time but if you are having difficulty voiding please let us know as we may need to temporarily insert a catheter.      You have any questions or concerns please do not hesitate to call us, [phone].    Salas Morrissey MD   [facility]   [facility]

## 2024-12-11 NOTE — OP NOTE
OPERATIVE REPORT  PATIENT NAME: Jose Becker    :  1955  MRN: [id number]    SURGERY DATE: [unfilled]    Surgeons and Role:  Surgeons and Role:     * Salas Duvall MD - Primary    Preop Diagnosis:  Elevated PSA     Post-Op Diagnosis Codes:     * Elevated prostate specific antigen (PSA) [R97.20]    Postop Diagnosis:   As above    Procedure(s):  transperineal saturation prostate biopsy with MRI fusion sampling of lesion or lesions utilizing the Precision Point perineal access device utilized to map the standard geographic sites of the prostate.    Specimen(s):  ID Type Source Tests Collected by Time Destination   1 : RIGHT ANTERIOR MEDIAL Tissue Prostate TISSUE EXAM Salas Duvall MD 2024 09    2 : Right Anterior lateral Tissue Prostate TISSUE EXAM Salas Duvall MD 2024 09    3 : Right posterior lateral Tissue Prostate TISSUE EXAM Salas Duvall MD 2024 09    4 : Left Anterior Medial Tissue Prostate TISSUE EXAM Salas Duvall MD 2024 09    5 : left anterior lateral Tissue Prostate TISSUE EXAM Salas Duvall MD 2024 0900    6 : Left posterior lateral Tissue Prostate TISSUE EXAM Salas Duvall MD 2024 0900    7 : left posterior medial Tissue Prostate TISSUE EXAM Salas Duvall MD 2024 09    8 : Left base Tissue Prostate TISSUE EXAM Salas Duvall MD 2024 0900    9 : right posterior medial Tissue Prostate TISSUE EXAM Salas Duvall MD 2024 0900    10 : right base Tissue Prostate TISSUE EXAM Salas Duvall MD 2024 0900    11 : SHAAN #1 left posterior peripheral zone Tissue Prostate TISSUE EXAM Salas Duvall MD 2024 0906        Estimated Blood Loss:   Minimal    Drains:  None    Anesthesia Type:   IV Sedation with Anesthesia    Complications:   None    Patient Disposition:  PACU     Indications:   Jose Becker is a 69 y.o. male with history of elevated PSA and abnormal MRI. Patient has not undergone prior biopsy of the prostate      Patient did undergo pre biopsy multiparametric MRI of the prostate. 1 lesions with highest PIRADs rating 4 so the patient was scheduled for transperineal saturation prostate biopsy with MRI fusion sampling of lesion or lesions.    The patient was scheduled for his procedure in an outpatient surgical context with the assistance of the anesthesia team for sedation. He was met in the preoperative holding area by the performing urologist, the anesthesia team and the intraoperative nursing staff. The procedure along with its risks and benefits were discussed and reviewed. Patient signed an informed consent.       OPERATIVE REPORT IN DETAIL:     Patient was then transferred to procedure suite. Pre-procedural time out was performed with all parties present. He was treated with periprocedural Ancef. He was placed in dorsal lithotomy with care to pad all pressure points. His perineum and genitalia were shave/prepped in the usual sterile fashion. The scrotum was elevated a secured aware from the perineum above the rectum.      Side-fire, biplanar transrectal ultrasound was introduced and the prostate was imaged in the sagittal and axial views. Prostate gland measurements noted: 23cc from MRI.    With the assistance of the UroNav technician, a survey of the prostate anatomy was performed. The technician then linked the previously obtained MRI images to the real-time ultrasound. The PIRADs lesions seen on MRI were identified on the ultrasound.     In the midportion of the left and right prostate, a ghulam was denoted in the perineal skin. Skin wheal was elevated with 0.5% Marcaine plain on either side.    The PrecisionPoint access needle was then introduced into the left perineal subcutaneous tissue. We visualized the tip of the needle. Spinal needle was introduced through the subcutaneous fat and into the pelvic floor muscle where a perineal pudendal block was performed with additional local anesthetic. This was repeated on  the patient's right side.    Utilizing the Precision Point access needle and stepper, ultrasound guidance was utilized to place the spring-loaded biopsy needle into MRI lesions. The first lesion that was a PIRADs 4 in the left posterior peripheral zone. 4 samples were taken. We confirmed good position of the needle strikes utilizing the fusion software.      We now moved to take our standard transperineal samples, which allowed us to carefully map and saturate each of the 10 zones that have subdivided the prostate into zonal anatomy.     The Precision Point access needle and stepper was positioned into the RIGHT perineal space and ultrasound guidance was utilized to place the spring-loaded biopsy needle into the following areas    RIGHT anterior medial: 2 biopsies taken  RIGHT posterior medial: 2biopsies taken  RIGHT posterior lateral: 2biopsies taken  RIGHT base: 2biopsies taken  RIGHT anterior lateral: 2 biopsies taken  The Precision Point access needle and stepper was re-positioned into the LEFT perineal space and ultrasound guidance was utilized to place the spring-loaded biopsy needle into the following areas  LEFT anterior medial: 2biopsies taken  LEFT posterior medial: 2biopsies taken  LEFT posterior lateral: 2 biopsies taken  LEFT base: 2 biopsies taken  LEFT anterior lateral: 2 biopsies taken  LEFT anterior medial: 2 biopsies taken    Total biopsies (including standard transperineal saturation and additional fusion biopsy):24    Transrectal ultrasound removed. The scrotum was released. Some gentle pressure was placed on the perineum for approximately 5 minutes for hemostasis.    At the completion of the procedure, the patient was taken out of dorsal lithotomy, recovered from their anesthesia, and transferred to PACU in good condition.     Overall, the patient tolerated the procedure and there were no complications.    Plan: The patient will be monitored in recovery, allowed to void prior to discharge and  return for biopsy pathology review in the office with their primary urologist.    I performed the entire procedure as the attending surgeon.    Salas Duvall MD   [facility]   [facility]

## 2024-12-12 ENCOUNTER — RESULTS FOLLOW-UP (OUTPATIENT)
Dept: UROLOGY | Facility: CLINIC | Age: 69
End: 2024-12-12

## 2024-12-12 PROCEDURE — G0416 PROSTATE BIOPSY, ANY MTHD: HCPCS | Performed by: PATHOLOGY

## 2024-12-19 ENCOUNTER — OFFICE VISIT (OUTPATIENT)
Dept: SLEEP CENTER | Facility: CLINIC | Age: 69
End: 2024-12-19
Payer: MEDICARE

## 2024-12-19 VITALS
HEIGHT: 71 IN | WEIGHT: 200.4 LBS | DIASTOLIC BLOOD PRESSURE: 75 MMHG | BODY MASS INDEX: 28.06 KG/M2 | RESPIRATION RATE: 16 BRPM | TEMPERATURE: 97.5 F | OXYGEN SATURATION: 99 % | SYSTOLIC BLOOD PRESSURE: 124 MMHG | HEART RATE: 66 BPM

## 2024-12-19 DIAGNOSIS — G47.33 OSA (OBSTRUCTIVE SLEEP APNEA): Primary | ICD-10-CM

## 2024-12-19 DIAGNOSIS — Z96.82 S/P INSERTION OF HYPOGLOSSAL NERVE STIMULATOR: ICD-10-CM

## 2024-12-19 PROCEDURE — 99215 OFFICE O/P EST HI 40 MIN: CPT | Performed by: STUDENT IN AN ORGANIZED HEALTH CARE EDUCATION/TRAINING PROGRAM

## 2024-12-19 PROCEDURE — G2211 COMPLEX E/M VISIT ADD ON: HCPCS | Performed by: STUDENT IN AN ORGANIZED HEALTH CARE EDUCATION/TRAINING PROGRAM

## 2024-12-19 NOTE — PROGRESS NOTES
[facility]  Sleep Medicine Follow up/ Established Patient Visit      Assessment/Plan:  1. MILDRED (obstructive sleep apnea)        2. S/P insertion of hypoglossal nerve stimulator            Jose is a pleasant 69-year-old gentleman with a PMHx of CAD status post STEMI status post PTCA, HTN, cervical radiculopathy, macular degeneration, HLD, RLS, PLMD who presents in follow up for MILDRED (baseline AHI 23.7 in 2021) status post inspire implantation, activation 10/2021.  I do believe that he is steadily moving in the right direction; he sounds to been able to use it more consistently over the past few weeks, although for a reason that he is unable to fully explain, he has started going to bed much earlier than is probably necessary.  This in turn is causing him to not fall asleep as quickly, and thus be awake after the start delay concludes, resulting in excessive pauses at the start of the night.  It is worth noting that his ESS is drastically improved (5 compared to 10) from his last visit.    Recommended that he try a more consistent bedtime, closer to ~2200/2300, 2 try to avoid pauses.   Start delay increased to 60 minutes to help facilitate the above  Reviewed that right now we are simply trying to get him to use it consistently; encouraged him to alternate between level 1 and 2 on his device over the next few months, to try to get consistent use prior to his repeat inspire titration study.  If he is able to obtain consistent usage, we will plan for a repeat titration study following his next visit.  He will Return in about 3 months (around 3/19/2025).      Inspire Settings:  Outgoing Settings  Electrode configuration A + - +   Amplitude (V) 2.6  Range (V) 2.4 to 3.4  Pulse width (us)/ Rate (Hz)  90/33  Start delay (min) 60  Pause Time (min) 30  Therapy Duration (hr) 8    ________________________________________________________________________________________________    Per Last Visit Note  "(Date: 11/14/2024):  Jose is a pleasant 68-year-old gentleman with a PMHx of CAD status post STEMI status post PTCA, HTN, cervical radiculopathy, macular degeneration, HLD, RLS, PLMD who presents in follow up for MILDRED (baseline AHI 23.7 in 2021) status post inspire implantation, activation 10/2021.  He is doing slightly better with regards to the uncomfortable \"zapping\" type sensation in his right lower jaw; namely, it sounds as though if he utilizes the pause function when this happens, he typically will fall back asleep and sleep through the night.  His largest difficulty right now is moreso with remembering to turn the device on when he goes to bed.     Will have him continue the \"pause\" strategy to help deal with this subjective \"zapping\" sensation; of note, he did not endorse any of this sensation during stimulus testing today.  Discussed strategies to help him remember to utilize the device, including placing a note by his light reminding him to do so as well as setting an alarm for the same time every night.  Given some ongoing delay in falling asleep, we will increase his start delay to 45  For now, we will have him keep the same level for the next week or so, then next week on Thursday, increase the setting by 1 level for ~2 weeks, before trying to go up 1 more level.  Would like to undergo a retitration study, however this will be dependent on his status at his next appointment.  Reviewed the importance of treating MILDRED, and remembering to utilize his inspire therapy regularly.    At this time, he denies substantial symptoms of his restless leg syndrome; will continue to monitor this closely and determine if medication changes are needed in the future.  He will Return in about 5 weeks (around 12/19/2024).        Inspire Settings:  Outgoing Settings  Electrode configuration A + - +   Amplitude (V) 2.6  Range (V) 2.4 to 3.4  Pulse width (us)/ Rate (Hz)  90/33  Start delay (min) 45  Pause Time (min) " 30  Therapy Duration (hr) 8      Sleep Studies:  -HSAT 1/12/2023: TRT: 63.8 minutes, FANNIE: 8.6, O2 pio: 89%     -HSAT 8/25/2022: TRT: 6 hours 57 minutes, FANNIE: 25.3, O2 pio: 86% 32 central/mixed apneas of 174 events.    -Inspire Titration Study 1/27/2022: Effective titration with AHI of 2 at settings of 3.2-3.4 V on electrode configuration 8.    -Note: Baseline AHI 30 on control night study.  Additional documentation stating an AHI of 23.7 in 2021.    ________________________________________________________________________________________________      Interval History: Jose Becker is a 69 y.o. male with a PMHx of CAD status post STEMI status post PTCA, HTN, cervical radiculopathy, macular degeneration, HLD, RLS, PLMD who presents in follow up for MILDRED (baseline AHI 23.7 in 2021) status post inspire implantation, activation 10/2021.    Inspire Hx  -Primary symptoms of concern the lead to inspire: Snoring, not a lot of symptoms before such as EDS.  -He has a history of moderate obstructive sleep apnea, AHI 23.7 diagnosed in 2021.      -His device was first activated in October 2021 at 0.8 V on configuration B, off minus off.    -At a follow-up visit with Dr. Darling, it seems that patient was changed to electrode configuration a at the range of 2 to 3 V.    -At a follow-up visit with Dr. Deshpande, the device was further titrated on configuration A.   -He had an inspire fine-tune sleep study in January 2022-that test showed an effective result with an AHI of 2 at the setting of 3.2 to 3.4 V on electrode configuration A..    -In a follow-up visit after the test, he was maintained on the current settings.  May 2022-notes describe some issues with tongue movement and discomfort, was at 2.8 V.    -Sleep test in May 2022 showed a respiratory event index of 25.3, there were 32 central/mixed apneas out of 174 events.    -Follow-up after that test, it was discussed that he had intolerance of higher voltage settings on  "configuration a and the patient was changed to configuration B at 1.3 V.  The patient had some symptoms of right-sided neck pain and back pain which she felt was attributable to the inspire device prior to this change.    -December 2022-due to discomfort, pulse width and rate was changed to 60/40 configuration B.    -A subsequent home sleep test in January 2023 showed a respiratory event index of 8.6, events much worse in the supine position, normal oxygenation.  Was at 1.3 volts?    -At his visit in February 2024, he was adjusted to 1.1 V with a pulse width/rate combination of 120/40.    -At his visit in March 2024, describe waking up with discomfort from the device, was changed to 120/33, 2.2 V.    -At a follow-up in April 2024, he had concern the device was not functioning properly.  Waveforms and impedance were checked and were normal.  -At his visit in June 2024, he expressed concern that the device was \"turning off in the middle of the night\" and not working properly.  Was not using it consistently as a result of this, stating that he felt the tongue stimulation when he first started the night but awakens in the middle of the night and did not feel it.  Reassurance was provided and he was encouraged to use it more consistently, with plans for a repeat HSAT at his subsequent appointment to verify efficacy.  -8/2024 -reverted back to Default configuration (A, plus minus plus) and pulse width/rate (90/33), started amplitude of 2.6.  Plan for retitration study in ~2 months.  -9/2024: Pause time increased, encouraged to try utilizing the pause function overnight in order to deal with the uncomfortable sensation in his chin waking him up and causing him to turn the device off  -11/2024: Continue to utilize positive strategy, however at this visit he was having difficulty remembering to turn it on when he initially went to bed.  Discussed strategies to overcome this, start delay increased to 45 minutes.        At " "today's visit:  He states he is using it \"pretty regularly.\"     Difficulties:   -Trouble falling asleep, resulting in multiple pauses.   -Not sure if the device turning on right as he's falling asleep or not.     The \"zapping\"/annoying sensation is now mainly happening with trying to fall asleep.     Not waking up as often. Does feel that a single pause is long enough.       Inspire Settings  Incoming Settings  Electrode configuration A + - +   Amplitude (V) 2.6  Range (V) 2.4 to 3.4  Pulse width (us)/ Rate (Hz)  90/33  Start delay (min) 45  Pause Time (min) 30  Therapy Duration (hr) 8      Iron panel 2024:  Ferritin: 183  Iron saturation: 44%  TIBC: 286  Iron: 127        Mills Sleepiness Scale:  What are your chances of dozing?   0= no chance  1= slight chance  2= moderate chance  3= high chance    Sitting and readin  Watching TV: 1  Sitting, inactive in a public place (e.g. a theatre or a meeting): 0  As a passenger in a car for an hour without a break: 1  Lying down to rest in the afternoon when circumstances permit: 1  Sitting and talking to someone: 0  Sitting quietly after a lunch without alcohol: 1  In a car, while stopped for a few minutes in the traffic: 0      TOTAL   (was 10/24 last visit)  Greater or equal to 10 is positive for excessive daytime sleepiness          SLEEP HYGIENE QUESTIONS:  Bedtime: \"I wait to go in until I'm ready to go in.\" Usually 2030/2100, sometimes between 4227-2358.    Time it takes to fall sleep: Varies   -Not sure why bedtime has changed.   Wake up Time: 0800   Number of times patient wakes up per night: 2-3  Naps: 0  Estimated total sleep time ( in a 24 hour period of time): ~6 hours       SLEEP RELATED ROS  Review of Systems  Allergies   Allergen Reactions    Sulfa Antibiotics Hives    Clarithromycin Rash    Penicillins Hives and Rash       CURRENT MEDICATIONS:  Current Outpatient Medications   Medication Instructions    Alirocumab (PRALUENT SC)     aspirin 81 " "MG tablet 1 tablet, Daily    budesonide-formoterol (Symbicort) 160-4.5 mcg/act inhaler Inhale by inhalation route for 90 days.    chlorhexidine (PERIDEX) 0.12 % solution     Cholecalciferol 50 MCG (2000 UT) CAPS Daily    clindamycin (CLEOCIN) 300 MG capsule     clopidogrel (PLAVIX) 75 mg tablet clopidogrel 75 mg tablet    DULoxetine (CYMBALTA) 30 mg delayed release capsule Take by oral route for 30 days.    gabapentin (NEURONTIN) 300 mg capsule     gabapentin (NEURONTIN) 300 mg capsule 1 capsule, Every 8 hours    levothyroxine 100 mcg    lidocaine (LIDODERM) 5 % 1 patch, Transdermal, Every 24 hours    metoprolol succinate (TOPROL-XL) 25 mg 24 hr tablet Toprol XL 25 mg tablet,extended release    mometasone (ELOCON) 0.1 % lotion Topical, As needed, 2 drops to each ear canal as needed    montelukast (SINGULAIR) 10 mg tablet montelukast 10 mg tablet    nitroglycerin (NITROSTAT) 0.4 mg SL tablet     Omega-3 Fatty Acids (OMEGA-3 FISH OIL PO) 1 capsule every day by oral route.    OMEGA-3 FATTY ACIDS PO 360mg    omeprazole (PriLOSEC) 20 mg delayed release capsule     tadalafil (CIALIS) 5 mg, Oral, Daily    tiotropium (Spiriva Respimat) 1.25 MCG/ACT AERS inhaler            PHYSICAL EXAMINATION:  Vital Signs: /75 (BP Location: Left arm, Patient Position: Sitting, Cuff Size: Large)   Pulse 66   Temp 97.5 °F (36.4 °C) (Temporal)   Resp 16   Ht 5' 11\" (1.803 m)   Wt 90.9 kg (200 lb 6.4 oz)   SpO2 99%   BMI 27.95 kg/m²     Constitutional: NAD, well appearing   Mental Status: AAOx3  Skin: Warm, dry, no rashes noted   Eyes: PERRL, normal conjunctiva  Posterior Airspace:   Mclain Tongue Position: 4  Retrognathia: absent  Overbite: absent  Tongue Scalloping/Ridging: absent  Uvula: normal  Chest: No evidence of respiratory distress, no accessory muscle use; no evidence of peripheral cyanosis  Abdomen: Soft, NT/ND  Extremities: No digital clubbing or pedal edema  Neuro: Strength 5/5 throughout, sensation grossly " No intact      I have spent a total time of 45-50 minutes in caring for this patient on the day of the visit/encounter including Diagnostic results, Prognosis, Risks and benefits of tx options, Instructions for management, Patient and family education, Importance of tx compliance, Risk factor reductions, Documenting in the medical record, Reviewing / ordering tests, medicine, procedures  , and Obtaining or reviewing history  .        Electronically signed by:    Bucky Bob DO  Board-Certified Neurology and Sleep Medicine  [facility]  12/19/24

## 2024-12-19 NOTE — PATIENT INSTRUCTIONS
-Over the next 3 months, the goal is moreso to alternate between 1 and 2, and try for consistent use. You are on Level 2 today.  -If you experience discomfort and this disturbs her sleep or prevents an increase in the levels, please let me know.  If this occurs, a follow-up visit may be needed to change some comfort settings with the inspire device.    -As we discussed today, also try going to bed a little bit later (1030-11:00 PM); this may be more reasonable, and result in your falling asleep faster with fewer (if any) pauses.  -We are also increasing her start delay to 60 minutes to try to help with this.    Please remember you must turn off (White button) or pause the Inspire device anytime you are eating, drinking, chewing, or swallowing as there is a risk of choking if you do so when the Inspire device is active.     Please bring your remote to each visit with me    If you have device questions in the middle of the night, please call Inspire support  ([phone])     For problems using Inspire, sleep issues, etc, please contact me in the office in the day or send a message via [employer]

## 2024-12-23 ENCOUNTER — TELEPHONE (OUTPATIENT)
Dept: UROLOGY | Facility: CLINIC | Age: 69
End: 2024-12-23

## 2024-12-23 ENCOUNTER — TELEPHONE (OUTPATIENT)
Age: 69
End: 2024-12-23

## 2024-12-23 ENCOUNTER — DOCUMENTATION (OUTPATIENT)
Dept: HEMATOLOGY ONCOLOGY | Facility: CLINIC | Age: 69
End: 2024-12-23

## 2024-12-23 ENCOUNTER — OFFICE VISIT (OUTPATIENT)
Dept: UROLOGY | Facility: CLINIC | Age: 69
End: 2024-12-23
Payer: MEDICARE

## 2024-12-23 ENCOUNTER — PATIENT OUTREACH (OUTPATIENT)
Dept: HEMATOLOGY ONCOLOGY | Facility: CLINIC | Age: 69
End: 2024-12-23

## 2024-12-23 VITALS
WEIGHT: 201.4 LBS | RESPIRATION RATE: 16 BRPM | TEMPERATURE: 97.5 F | SYSTOLIC BLOOD PRESSURE: 120 MMHG | HEIGHT: 71 IN | DIASTOLIC BLOOD PRESSURE: 62 MMHG | HEART RATE: 75 BPM | BODY MASS INDEX: 28.19 KG/M2 | OXYGEN SATURATION: 98 %

## 2024-12-23 DIAGNOSIS — C61 PROSTATE CANCER (HCC): Primary | ICD-10-CM

## 2024-12-23 PROCEDURE — 99214 OFFICE O/P EST MOD 30 MIN: CPT | Performed by: UROLOGY

## 2024-12-23 NOTE — PROGRESS NOTES
UROLOGY PROGRESS NOTE   [facility]  [address] [place] [address] [address]  [address]  [place] [address]  [phone]  Fax:[phone]  [email or web address]      NAME: Jose Becker  AGE: 69 y.o. SEX: male  : 1955   MRN: [id number]    DATE: 2024  TIME: 8:51 AM    Assessment and Plan:    [place] 3+4 equal 7 prostate cancer left side with 1 focus of [place] 6-stage T1.  However there is perineural invasion and acinar carcinoma.  I do not consider him to be a surgical candidate.  I did make referral to radiation oncology and we will send his tissue for decipher testing to see if he is a candidate for active surveillance but given that there is perineural invasion and Acinar carcinoma that has to be approached with caution.  See what radiation says, see me automatically in 6 months with another PSA.  See what decipher testing says.  He may also be interested in left side cryoablation.               Chief Complaint     Chief Complaint   Patient presents with    Follow-up       History of Present Illness   Prostate cancer, new diagnosis-status post MR fusion transperineal prostate biopsy by Dr. Duvall 2024-pathology shows [place] 3+4 equal 7 with perineural invasion base and are type left posterior peripheral zone, and [place] 3+3 equal 6 left posterior medial.  All else  is benign.  MRI shows category 4 left apical medial peripheral zone.  23 cc gland.  PSA 7.0.  He was last seen by me 2023 for anorgasmia.  PSA jumped to 4.120 2024 which prompted the MRI and subsequently the biopsy.  Final Diagnosis   A. Prostate, Right anterior medial, Biopsy:  - Benign prostatic tissue      B. Prostate, Right Anterior lateral, Biopsy:  - Atypical small acinar proliferation (ASAP)      C. Prostate, Right posterior lateral, Biopsy:  - Atypical small acinar proliferation (ASAP)      D. Prostate, Left anterior medial, Biopsy:  - Benign prostatic tissue      E. Prostate, Left anterior  lateral, Biopsy:  - Benign prostatic tissue      F. Prostate, Left posterior lateral, Biopsy:  - Atypical small acinar proliferation (ASAP)      G. Prostate, Left posterior medial, Biopsy:  - Prostatic adenocarcinoma, acinar type, [place] score 3+3=6, Prognostic Grade Group 1, involving 1 of 2 cores (20% overall involvement)   - Perineural invasion: Not identified       H. Prostate, Left base, Biopsy:  - Atypical small acinar proliferation (ASAP)   - High-grade prostatic intraepithelial neoplasia (HGPIN)      I. Prostate, Right posterior medial, Biopsy:  - Benign prostatic tissue      J. Prostate, Right base, Biopsy:  - High-grade prostatic intraepithelial neoplasia (HGPIN)       K. Prostate, SHAAN #1 left posterior peripheral zone, Biopsy:  - Prostatic adenocarcinoma, acinar type, Cat score 3+4=7, Prognostic Grade Group 2, involving 4 of 4 cores (20% overall involvement)   - Percentage of Cat pattern 4: <5%  - Perineural invasion: Present        Standard discussion with the patient.  We discussed the following-    His pathology report and the implications upfront and long-term.    Laparoscopic assisted robotic prostatectomy-the procedure itself was discussed and the risks of bleeding infection incontinence and impotence.  This is to be done by a robotic surgeon in our network and will require a separate referral to them.    Radiation therapy: Described IMRT, SBRT, CyberKnife ,brachytherapy and proton beam.  I do not recommend brachytherapy due to the side effects upfront and long-term.  He would be a decent IMRT/SBRT candidate-the risks of bleeding infection impotence incontinence which are substantially less then other treatments were explained.  Referral to radiation oncology will be made upon his request.  He understands that after radiation therapy, he can undergo salvage cryoablation but cannot undergo a laparoscopic assisted robotic prostatectomy.  Proton beam is also touched upon, but very expensive  and I believe unnecessary with IMRT and SBRT, along with substandard oncologic results documented in several studies.  Offered consultation with radiation oncology to discuss possible radiotherapy.    Ablative therapy-this is in the form of cryoablation which is currently offered at our institution.  This is either total gland ablation or focal ablation.  The need for catheter after the procedure, risks of impotence were explained, along with possible incontinence and rectourethral fistula.  This is an outpatient procedure, requiring at least a week of Evans catheter and intensive monitoring afterwards especially if this is only focal.  There is a risk of leaving undiagnosed cancer untreated.    Active surveillance: Used if he has Cat 6 prostate cancer , or [place] 3+4 equal 7 with low risk decipher testing.  Active surveillance and how it is done was explained to the patient and the possible need for treatment in the future.  This is done by monitoring a PSA every 6 months and repeating the biopsy and MRI if there is a sharp PSA rise, and also the possibility of confirmatory biopsy or MRI at 1 year.    Anorgasmia-extended discussion regarding this, we had the same discussion last year.  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  Patient said this started after COVID and I do not know for a fact that COVID causes this but there is certainly an association in his history.     The following portions of the patient's history were reviewed and updated as appropriate: allergies, current medications, past family history, past medical history, past social history, past surgical history and problem list.  Past Medical History:   Diagnosis Date    Allergic rhinitis     Arthritis     Asthma     COVID-19 12/2020    Heart attack (HCC)     Hypertensive disorder 7/15/2013    Myocardial infarction (HCC)     2012 / 2018    S/P insertion of hypoglossal nerve  stimulator 6/27/2024    Sleep apnea     does not use C-PAP     Past Surgical History:   Procedure Laterality Date    CORONARY ARTERY BYPASS GRAFT  2012    2 stents..2018..stent / 2012 3 vessel bypass    EXAMINATION UNDER ANESTHESIA N/A 9/24/2020    Procedure: DRUG INDUCED SLEEP ENDOSCOPY;  Surgeon: Nasir Darling MD;  [facility];  Service: ENT    JOINT REPLACEMENT      UT OPEN IMPLANTATION CRANIAL NERVE SCOOBY & PULSE GEN N/A 9/8/2021    Procedure: INSERTION UPPER AIRWAY STIMULATOR, INSPIRE IMPLANT;  Surgeon: Nasir Darling MD;  [facility];  Service: ENT    UT PROSTATE NEEDLE BIOPSY ANY APPROACH N/A 12/11/2024    Procedure: TRANSPERINEAL MRI FUSION BX PROSTATE;  Surgeon: Salas Duvall MD;  Location[facility];  Service: Urology    REPLACEMENT TOTAL KNEE BILATERAL      ROTATOR CUFF REPAIR       shoulder  Review of Systems   Review of Systems    Active Problem List     Patient Active Problem List   Diagnosis    STEMI (ST elevation myocardial infarction) (HCC)    MILDRED (obstructive sleep apnea)    Smoking    History of coronary artery bypass graft    Status post aorto-coronary artery bypass graft    Obesity    Allergic rhinitis    CAD (coronary artery disease), native coronary artery    Cervical radiculopathy    Chronic back pain    Chronic low back pain    Closed fracture of shoulder    Disorder of lumbar spine    Hypertensive disorder    Macular degeneration    Mixed hyperlipidemia    Moderate persistent asthma without complication    Preop pulmonary/respiratory exam    Rotator cuff injury    S/P PTCA (percutaneous transluminal coronary angioplasty)    PLMD (periodic limb movement disorder)    Snoring    Anorgasmia of male    Erectile dysfunction    S/P insertion of hypoglossal nerve stimulator    Restless leg syndrome    Elevated PSA       Objective   /62 (BP Location: Right arm, Patient Position: Sitting, Cuff Size: Adult)   Pulse 75   Temp 97.5 °F (36.4 °C) (Temporal)   Resp 16    "Ht 5' 11\" (1.803 m)   Wt 91.4 kg (201 lb 6.4 oz)   SpO2 98%   BMI 28.09 kg/m²     Physical Exam        Current Medications     Current Outpatient Medications:     Alirocumab (PRALUENT SC), , Disp: , Rfl:     aspirin 81 MG tablet, Take 1 tablet by mouth daily, Disp: , Rfl:     Cholecalciferol 50 MCG (2000 UT) CAPS, Daily, Disp: , Rfl:     clindamycin (CLEOCIN) 300 MG capsule, , Disp: , Rfl:     clopidogrel (PLAVIX) 75 mg tablet, clopidogrel 75 mg tablet, Disp: , Rfl:     gabapentin (NEURONTIN) 300 mg capsule, Take 1 capsule by mouth every 8 (eight) hours, Disp: , Rfl:     levothyroxine 100 mcg tablet, 100 mcg, Disp: , Rfl:     metoprolol succinate (TOPROL-XL) 25 mg 24 hr tablet, Toprol XL 25 mg tablet,extended release, Disp: , Rfl:     mometasone (ELOCON) 0.1 % lotion, Apply topically as needed (2 drops each ear canal once daily as needed) 2 drops to each ear canal as needed, Disp: 60 mL, Rfl: 3    montelukast (SINGULAIR) 10 mg tablet, montelukast 10 mg tablet, Disp: , Rfl:     Omega-3 Fatty Acids (OMEGA-3 FISH OIL PO), 1 capsule every day by oral route., Disp: , Rfl:     omeprazole (PriLOSEC) 20 mg delayed release capsule, , Disp: , Rfl:     tadalafil (CIALIS) 5 MG tablet, Take 1 tablet (5 mg total) by mouth in the morning, Disp: 90 tablet, Rfl: 0    budesonide-formoterol (Symbicort) 160-4.5 mcg/act inhaler, Inhale by inhalation route for 90 days. (Patient not taking: Reported on 12/5/2024), Disp: , Rfl:     chlorhexidine (PERIDEX) 0.12 % solution, , Disp: , Rfl:     DULoxetine (CYMBALTA) 30 mg delayed release capsule, Take by oral route for 30 days. (Patient not taking: Reported on 12/5/2024), Disp: , Rfl:     gabapentin (NEURONTIN) 300 mg capsule, , Disp: , Rfl:     lidocaine (LIDODERM) 5 %, Place 1 patch on the skin every 24 hours, Disp: , Rfl:     nitroglycerin (NITROSTAT) 0.4 mg SL tablet, , Disp: , Rfl:     OMEGA-3 FATTY ACIDS PO, 360mg, Disp: , Rfl:     tiotropium (Spiriva Respimat) 1.25 MCG/ACT AERS " inhaler, , Disp: , Rfl:     I have spent a total time of 30 minutes in caring for this patient on the day of the visit/encounter including Diagnostic results, Prognosis, Risks and benefits of tx options, Instructions for management, Patient and family education, Impressions, Counseling / Coordination of care, Documenting in the medical record, Reviewing / ordering tests, medicine, procedures  , and Obtaining or reviewing history  .     Hong Ruiz MD

## 2024-12-23 NOTE — PROGRESS NOTES
All records needed are in patients chart. No records retrieval needed at this time.     Referral received/ Chart reviewed for work up completed   Referral to: radiation oncology   Dx: prostate    Imaging completed: [x][facility] []Other:    [] PET/CT   [x] MRI   [] CT   [] US   [] Mammo   [] Bone scan   [] N/A    Pathology completed: [x][facility] []Other:    Date: 12/11/24   Location:   []N/A    Additional records needed:   [] Genomic report   [] Genetic testing results   [] Office Note   [] Procedure/ Operative note   [] Lab results   [x] N/A      [] Radiation Oncology records retrieval needed (PN to route to rad/onc clerical pool once scheduled)  Date:  Location:      Urologist:   Sara           PSA Date:        Lab Results   Component Value Date    PSA 4.027 (H) 08/29/2024    PSA 4.120 (H) 08/26/2024    PSA 2.5 08/16/2022

## 2024-12-23 NOTE — TELEPHONE ENCOUNTER
I called Jose in response to a referral that was received for patient to establish care with Radiation Oncology    Outreach was made to schedule a consultation.    A consultation was scheduled for patient during this call. Patient is scheduled on 1/15/25 at 11:00 AM [facility]    Schedule within: 7 days    Pt requested to wait until mid January for this appt. FYI message sent to leadership pool to let them know why pt is scheduled outside recommended timeframe.

## 2024-12-23 NOTE — PATIENT INSTRUCTIONS
You have been diagnosed with prostate cancer.  This is a very common cancer in men, with a lifetime incidence of at least 1 in 9 men.  Some cancers can be aggressive and more life-threatening but the majority of prostate cancers depending on their Cat grade are not immediately life-threatening.    We grade prostate cancer in terms of  aggressiveness with a scoring system called the Cat score.  The pathologist takes the 2 worst looking specimens from an area of the biopsy which qualifies cancer, and gives them a score from 1-5 where 1 is not even cancerous and 5 is a very aggressive cancer.  They then take the combination of the scores where 1 area may be scored a 3, and another area may be scored a 4.  They combine these 2 numbers to create a Cat score.  If there is more [place] 4 than there is [place] 3, then the score becomes [place] 4+3 equal 7.  If Cat 3 is the predominant pattern, then it is Cat 3+4 equal 7.  The [place] 4+3 is more aggressive than the [place] 3+4.  Likewise if you have [place] 4 in all of the patterns, it will be [place] 4+4 = 8.    [place] 6 is the lowest score you can have.  We do not even actively treat [place] 6 prostate cancer but observe it.  This is done by active surveillance which means a PSA every 6 months and repeat MRI and/or confirmatory biopsy at 1 year.    [place] 3+4 equal 7 is considered to be intermediate grade but lower intermediate grade.  This we sent for molecular testing.  If the molecular testing says it is okay, we can do active surveillance with this.  If the molecular testing says it is high risk, then you will need to move to a treatment.  [place] 4+3 equal 7 and anything higher such as [place] 8 9 and 10 need treatment.  These tend to be aggressive and have the risk of metastases.    Treatment is based on your age and the Cat score and the stage of the disease.    Surgery: This is performed by robotic assisted laparoscopic prostatectomy by  our urologists that are skilled in this.  This is usually an overnight stay in the hospital, an operation done laparoscopically with robot assistance, and you go home with a catheter for 1 to 2 weeks.  Side effects of this can be bleeding, infection, surgical complications in general and urinary incontinence and impotence.  If you decide to go this route, you will meet with one of our robotic surgeons will explain this to you more in detail.  Surgery is not recommended for men greater than 70 years old.  This is because of the risk of incontinence.    Radiation therapy: This is given in the form of intensity modulated radiation therapy, usually in 28 days of treatment 5 days/week.  Other forms of radiation or proton beam therapy offered elsewhere, and brachytherapy which is radioactive seeds also given elsewhere.  There is a form of radiation called CyberKnife which is 5 treatments giving the same dose.  If you have radiation therapy, you cannot have prostate removal after that.    Ablative therapy: This is given in the form of either cryoablation of the prostate or just the part that is affected by cancer, or high intensity focused ultrasound.  High intensity focused ultrasound known as HIFU is not currently available but I am awaiting clearance from our value analysis committee.  I have been trained in it twice.  I have been performing cryotherapy since 2003.  This is an overnight stay in the hospital where argon gas was used to create an ice ball on a needle that is placed into the affected area of the prostate and freezing of the cancer there.  After that you will wear a catheter for about a week and the catheter will be removed.  Side effects can be bleeding, infection, prolonged urinary retention, impotence-although if you have only 1 side done this is not the usual case, also incontinence is very rare unless you have had radiation before.  Cryotherapy is often used for men who have already had radiation but  have had recurrence despite the radiation.  This is called salvage therapy.    Active surveillance: As described above.    Androgen deprivation: This consists of receiving a drug that blocks your testosterone production-prostate cancer depends on testosterone for growth and this will put you into remission.  It is not curative.  Generally I save this for men over 80 years old or men under 80 that have severe life-threatening health issues aside from the prostate cancer.  It can be given every 6 months or only when the PSA rises a certain.

## 2024-12-23 NOTE — PROGRESS NOTES
Oncology Nurse Navigator made call to patient due to referral to provider within [facility]. I spoke with patient about my role as an Oncology Nurse Navigator. I explained how to reach the office for any routine or urgent matters and the availability of patient navigation for non urgent matters. Explained oncology navigation is here to help patients through their journey and to offer assistance with any barriers to care. As a team, we strive to be patient advocates and help navigate healthcare system. Patient aware that medical oncology nursing will be primary contact once consult is complete and patient navigator will continue to offer support through entire journey. Conversation is based on evidence based care to optimize patient outcomes. Emotional support provided throughout conversation.       Evaluated for any barriers to care.   Distress thermometer completed by PN   Genetic testing not indicated   Smoking status verified non smoker  Prior cancer and radiation history negative  Provided contact information for nurse navigator and patient navigator  Verified PCP/insurance on file  Discussed use of My Chart patient uses regularly     Answered questions to pt's satisfaction.  Reviewed upcoming appts. Provided support and provided direct contact information for any questions or concerns.       Future Appointments   Date Time Provider Department Center   1/15/2025 11:00 AM Sandip Matson MD [facility] Rad [facility]   3/20/2025  1:50 PM Bucky Bob DO [facility]   6/30/2025  2:15 PM Hong Ruiz MD [facility]

## 2024-12-23 NOTE — TELEPHONE ENCOUNTER
Decipher paperwork along with pertinent information faxed to [phone]. Fax confirmation received. Will upload form into patients chart.

## 2024-12-26 DIAGNOSIS — N52.9 ERECTILE DYSFUNCTION, UNSPECIFIED ERECTILE DYSFUNCTION TYPE: ICD-10-CM

## 2024-12-26 RX ORDER — TADALAFIL 5 MG/1
5 TABLET ORAL DAILY
Qty: 90 TABLET | Refills: 1 | Status: SHIPPED | OUTPATIENT
Start: 2024-12-26

## 2025-01-06 ENCOUNTER — LAB REQUISITION (OUTPATIENT)
Dept: LAB | Facility: HOSPITAL | Age: 70
End: 2025-01-06

## 2025-01-06 DIAGNOSIS — R97.20 ELEVATED PROSTATE SPECIFIC ANTIGEN (PSA): ICD-10-CM

## 2025-01-06 LAB — SCAN RESULT: NORMAL

## 2025-01-07 ENCOUNTER — RESULTS FOLLOW-UP (OUTPATIENT)
Dept: UROLOGY | Facility: CLINIC | Age: 70
End: 2025-01-07

## 2025-01-07 PROBLEM — C61 PROSTATE CANCER (HCC): Status: ACTIVE | Noted: 2024-12-11

## 2025-01-07 NOTE — RESULT ENCOUNTER NOTE
I called the patient and gave him the results of the decipher testing.  This shows intermediate risk.  Given that he has a son are adenocarcinoma with perineural invasion, I recommend that he go on for treatment.  He has an appointment with radiation oncology on 15 January.  We will see him back for gold markers and SpaceOAR.  I explained all this to him and how this is all done.  I answered all of his questions.  Dr. Lenard denton

## 2025-01-08 ENCOUNTER — TELEPHONE (OUTPATIENT)
Dept: RADIATION ONCOLOGY | Facility: CLINIC | Age: 70
End: 2025-01-08

## 2025-01-08 DIAGNOSIS — R97.20 ELEVATED PSA: Primary | ICD-10-CM

## 2025-01-08 DIAGNOSIS — C61 PROSTATE CANCER (HCC): ICD-10-CM

## 2025-01-08 NOTE — TELEPHONE ENCOUNTER
Discussed reason for call was to get repeat PSA level prior to radiation consult. Discussed that if goes to [facility] the order is the computer, if he goes to [facility] we can mail him the lab work for it to be completed. He had further questions regarding Radiation treatment, discussed that that they will be further addressed at consult. He reported if we could explain blood work for PSA can be discussed with his wife, who is listed on communication consent form. Left message with Mindy his wife to call our office back, including office number. Patient was appreciative  of call.

## 2025-01-15 ENCOUNTER — CONSULT (OUTPATIENT)
Dept: RADIATION ONCOLOGY | Facility: CLINIC | Age: 70
End: 2025-01-15
Payer: MEDICARE

## 2025-01-15 ENCOUNTER — TELEPHONE (OUTPATIENT)
Dept: RADIATION ONCOLOGY | Facility: CLINIC | Age: 70
End: 2025-01-15

## 2025-01-15 VITALS
BODY MASS INDEX: 29.26 KG/M2 | TEMPERATURE: 97.8 F | HEART RATE: 62 BPM | WEIGHT: 209 LBS | HEIGHT: 71 IN | SYSTOLIC BLOOD PRESSURE: 114 MMHG | RESPIRATION RATE: 16 BRPM | DIASTOLIC BLOOD PRESSURE: 74 MMHG | OXYGEN SATURATION: 95 %

## 2025-01-15 DIAGNOSIS — C61 PROSTATE CANCER (HCC): Primary | ICD-10-CM

## 2025-01-15 PROCEDURE — 99211 OFF/OP EST MAY X REQ PHY/QHP: CPT | Performed by: RADIOLOGY

## 2025-01-15 PROCEDURE — 99205 OFFICE O/P NEW HI 60 MIN: CPT | Performed by: RADIOLOGY

## 2025-01-15 NOTE — PROGRESS NOTES
Consultation Visit   Name: Jose Becker      : 1955      MRN: [id number]  Encounter Provider: Sandip Matson MD  Encounter Date: 1/15/2025   Encounter department: [facility] RADIATION ONCOLOGY  :  Assessment & Plan  Prostate cancer (HCC)    Orders:    Ambulatory Referral to Radiation Oncology    Radiation Simulation Treatment  Jose Becker 1955 is a 69 y.o. male diagnosed with Kensal 7( 3+4 ) prostate adenocarcinoma after an elevated PSA of 4.12 in 2024.  He had prostate MRI on 2024 that was category 4 with a left apical medial peripheral zone lesion.  There was no evidence of extraprostatic tumor with no seminal vesicle invasion, no pelvic lymphadenopathy, and no pelvic osseous metastatic disease.  He has prostate biopsies were positive including the region of interest for Kensal score 3+4=7 disease in addition to Kensal score 3+3=6 disease.  There was perineural invasion identified.  He has clinical stage IIB, T1c, N0, M0 disease.  He saw Dr. Ruiz who ordered decipher testing that came back intermediate risk.  He is seen today for consultation for radiation therapy.  We discussed treatment options with him to include active surveillance, robotic prostatectomy, and definitive radiation therapy.  With him having intermediate risk disease, he is not comfortable with active surveillance and would like to pursue treatment.  Given his cardiac disease, he is not a good candidate for surgery and furthermore does not wish to pursue surgical resection.  We recommend definitive external beam radiation therapy with the placement of fiducial markers for daily image guided radiation therapy which will help to improve the accuracy of treatment and reduce side effects.  We also recommend placement of a SpaceOAR to reduce dose to the rectum.  We discussed the acute side effects and the potential chronic complications of radiation therapy with him. He does  consent to proceed with the treatment.  We also discussed that a short course androgen deprivation therapy could be considered but due to the potential side effects he would like to pursue definitive radiation therapy alone.  He will return for simulation and treatment planning purposes in the [facility] after placement of fiducial markers and SpaceOAR.    Addendum 1/17/25: Repeat PSA level from 1/16/25 came back at 3.20 NG/mL.    History of Present Illness   Chief Complaint   Patient presents with    Consult   Pertinent Medical History   Jose Becker 1955 is a 69 y.o. male with newly diagnosed Cat 7( 3+4 ) prostate adenocarcinoma. He presents in consultation for discussion of RT.   Referred by .     PMH includes: STEMI,  CAD, ED, hypoglossal nerve stimulator, MI, and bilateral knee replacements.        PS   Latest Ref Rng 0.000 - 4.000 ng/mL   1/23/2020 1.5    7/27/2021 2.2    8/16/2022 2.5    12/5/2023 2.98 lvh   8/26/2024 4.120 (H)    8/29/2024 4.027 (H)    **called to repeat PSA     9/17/24 MRI prostate  IMPRESSION:   1. PI-RADSv2.1 Category 4 - High (clinically significant cancer is likely to be present). Left apical medial peripheral zone lesion.   2. No extraprostatic tumor, seminal vesicle invasion, pelvic lymphadenopathy, or pelvic osseous metastatic disease.   3. Calculated prostate volume of 23 mL.     12/11/24 Tissue Exam  A. Prostate, Right anterior medial, Biopsy:  - Benign prostatic tissue      B. Prostate, Right Anterior lateral, Biopsy:  - Atypical small acinar proliferation (ASAP)      C. Prostate, Right posterior lateral, Biopsy:  - Atypical small acinar proliferation (ASAP)      D. Prostate, Left anterior medial, Biopsy:  - Benign prostatic tissue      E. Prostate, Left anterior lateral, Biopsy:  - Benign prostatic tissue      F. Prostate, Left posterior lateral, Biopsy:  - Atypical small acinar proliferation (ASAP)      G. Prostate, Left posterior  medial, Biopsy:  - Prostatic adenocarcinoma, acinar type, Cat score 3+3=6, Prognostic Grade Group 1, involving 1 of 2 cores (20% overall involvement)   - Perineural invasion: Not identified       H. Prostate, Left base, Biopsy:  - Atypical small acinar proliferation (ASAP)   - High-grade prostatic intraepithelial neoplasia (HGPIN)      I. Prostate, Right posterior medial, Biopsy:  - Benign prostatic tissue      J. Prostate, Right base, Biopsy:  - High-grade prostatic intraepithelial neoplasia (HGPIN)       K. Prostate, SHAAN #1 left posterior peripheral zone, Biopsy:  - Prostatic adenocarcinoma, acinar type, Butler score 3+4=7, Prognostic Grade Group 2, involving 4 of 4 cores (20% overall involvement)   - Percentage of Butler pattern 4: <5%  - Perineural invasion: Present     12/23/24   Pathology reviewed.  Stage T1 with perineural invasion and acinar carcinoma.  Treatment options discussed.  Not a surgical candidate.  Will refer to radiation oncology for discussion.  Decipher testing ordered to determine candidacy for active surveillance.     1/7/25  (telephone Call)  Decipher test showed intermediate risk; treatment recommended.  SpaceOar and fiducial markers discussed.     He is seen for consultation today with his wife.  He reports a fair urinary stream that is weak at times.  He has stable nocturia 2-3 times per night.  He has some erectile dysfunction which started after he contracted COVID in 2020.  He is on Cialis 5 mg daily.  He has normal bowel movements without any blood in his stools.  He has 2 children with a 27-year-old son and 42-year-old daughter that are healthy.  He does not know his father's history since he has no relation with his father.  His mother is 90 years old with Alzheimer's and lives in a nursing home.  She has not been able to recognize him for about 10 years.  He has 2 brothers with a 72-year-old with a history of melanoma 3 to 4 years ago that is doing well.  He  has a 61-year-old brother but does not have contact with him and does not know about his medical history.  He has no sisters.  He is retired from working construction.  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  He reports a history of arthritis and neuropathy of both of his hands that is improved with him being on gabapentin.  He has received physical therapy.  He does do exercises at home daily.  He status post carpal tunnel surgery on the right side.  He stopped smoking tobacco 12 years ago.  He does not drink any alcohol.  He does use medical marijuana with a vape daily usually in the morning and sometimes in the afternoon which helps with his hands/neuropathy.    Upcoming:  3/20/25 Sleep Medicine  6/10/25 Cardiology  6/30/25  Urology    Oncology History   Cancer Staging   Prostate cancer ([facility])  Staging form: Prostate, AJCC 8th Edition  - Clinical stage from 1/15/2025: Stage IIB (cT1c, cN0, cM0, PSA: 4.1, Grade Group: 2) - Signed by Sandip Matson MD on 1/15/2025  Histopathologic type: Adenocarcinoma, NOS  Stage prefix: Initial diagnosis  Prostate specific antigen (PSA) range: Less than 10  Cat primary pattern: 3  [place] secondary pattern: 4  [place] score: 7  Histologic grading system: 5 grade system  Location of positive needle core biopsies: One side  Oncology History   Prostate cancer ([facility])   12/11/2024 Initial Diagnosis    Prostate cancer ([facility])     12/11/2024 Biopsy    A. Prostate, Right anterior medial, Biopsy:  - Benign prostatic tissue      B. Prostate, Right Anterior lateral, Biopsy:  - Atypical small acinar proliferation (ASAP)      C. Prostate, Right posterior lateral, Biopsy:  - Atypical small acinar proliferation (ASAP)      D. Prostate, Left anterior medial, Biopsy:  - Benign prostatic tissue      E. Prostate, Left anterior lateral, Biopsy:  - Benign prostatic tissue      F. Prostate, Left posterior lateral, Biopsy:  - Atypical small acinar  proliferation (ASAP)      G. Prostate, Left posterior medial, Biopsy:  - Prostatic adenocarcinoma, acinar type, [place] score 3+3=6, Prognostic Grade Group 1, involving 1 of 2 cores (20% overall involvement)   - Perineural invasion: Not identified       H. Prostate, Left base, Biopsy:  - Atypical small acinar proliferation (ASAP)   - High-grade prostatic intraepithelial neoplasia (HGPIN)      I. Prostate, Right posterior medial, Biopsy:  - Benign prostatic tissue      J. Prostate, Right base, Biopsy:  - High-grade prostatic intraepithelial neoplasia (HGPIN)       K. Prostate, SHAAN #1 left posterior peripheral zone, Biopsy:  - Prostatic adenocarcinoma, acinar type, Cat score 3+4=7, Prognostic Grade Group 2, involving 4 of 4 cores (20% overall involvement)   - Percentage of [place] pattern 4: <5%  - Perineural invasion: Present     1/15/2025 -  Cancer Staged    Staging form: Prostate, AJCC 8th Edition  - Clinical stage from 1/15/2025: Stage IIB (cT1c, cN0, cM0, PSA: 4.1, Grade Group: 2) - Signed by Sandip Matson MD on 1/15/2025  Histopathologic type: Adenocarcinoma, NOS  Stage prefix: Initial diagnosis  Prostate specific antigen (PSA) range: Less than 10  [place] primary pattern: 3  [place] secondary pattern: 4  Cat score: 7  Histologic grading system: 5 grade system  Location of positive needle core biopsies: One side          Review of Systems Refer to nursing note.    Current Outpatient Medications on File Prior to Visit   Medication Sig Dispense Refill    Alirocumab (PRALUENT SC)       aspirin 81 MG tablet Take 1 tablet by mouth daily      clopidogrel (PLAVIX) 75 mg tablet clopidogrel 75 mg tablet      gabapentin (NEURONTIN) 300 mg capsule Taking am      levothyroxine 100 mcg tablet 100 mcg      metoprolol succinate (TOPROL-XL) 25 mg 24 hr tablet Toprol XL 25 mg tablet,extended release      mometasone (ELOCON) 0.1 % lotion Apply topically as needed (2 drops each ear canal once daily as needed)  "2 drops to each ear canal as needed 60 mL 3    montelukast (SINGULAIR) 10 mg tablet montelukast 10 mg tablet      Omega-3 Fatty Acids (OMEGA-3 FISH OIL PO) 1 capsule every day by oral route.      omeprazole (PriLOSEC) 20 mg delayed release capsule       tadalafil (CIALIS) 5 MG tablet Take 1 tablet (5 mg total) by mouth in the morning 90 tablet 1    budesonide-formoterol (Symbicort) 160-4.5 mcg/act inhaler Inhale by inhalation route for 90 days. (Patient not taking: Reported on 2024)      chlorhexidine (PERIDEX) 0.12 % solution  (Patient not taking: Reported on 2024)      Cholecalciferol 50 MCG (2000 UT) CAPS Daily (Patient not taking: Reported on 1/15/2025)      clindamycin (CLEOCIN) 300 MG capsule       DULoxetine (CYMBALTA) 30 mg delayed release capsule Take by oral route for 30 days. (Patient not taking: Reported on 2024)      gabapentin (NEURONTIN) 300 mg capsule Take 1 capsule by mouth every 8 (eight) hours (Patient not taking: Reported on 1/15/2025)      lidocaine (LIDODERM) 5 % Place 1 patch on the skin every 24 hours      nitroglycerin (NITROSTAT) 0.4 mg SL tablet  (Patient not taking: Reported on 2024)      OMEGA-3 FATTY ACIDS PO 360mg      tiotropium (Spiriva Respimat) 1.25 MCG/ACT AERS inhaler        No current facility-administered medications on file prior to visit.      Social History     Tobacco Use    Smoking status: Former     Current packs/day: 0.00     Average packs/day: 0.5 packs/day for 6.0 years (3.0 ttl pk-yrs)     Types: Cigarettes     Start date:      Quit date: 2009     Years since quittin.0    Smokeless tobacco: Former   Vaping Use    Vaping status: Never Used   Substance and Sexual Activity    Alcohol use: Not Currently    Drug use: Never    Sexual activity: Not Currently        Objective   /74 (BP Location: Left arm)   Pulse 62   Temp 97.8 °F (36.6 °C) (Temporal)   Resp 16   Ht 5' 11\" (1.803 m)   Wt 94.8 kg (209 lb)   SpO2 95%   BMI 29.15 kg/m² "     Pain Screening:  Pain Score: 0-No pain  ECOG ECOG Performance Status: 1 - Restricted in physically strenuous activity but ambulatory and able to carry out work of a light or sedentary nature, e.g., light house work, office work  Physical Exam  Constitutional:       General: He is not in acute distress.     Appearance: Normal appearance. He is well-developed. He is not diaphoretic.   HENT:      Head: Normocephalic and atraumatic.      Mouth/Throat:      Pharynx: No oropharyngeal exudate.   Eyes:      General: No scleral icterus.     Conjunctiva/sclera: Conjunctivae normal.      Pupils: Pupils are equal, round, and reactive to light.   Neck:      Thyroid: No thyromegaly.      Trachea: No tracheal deviation.   Cardiovascular:      Rate and Rhythm: Normal rate and regular rhythm.      Heart sounds: Normal heart sounds.   Pulmonary:      Effort: Pulmonary effort is normal. No respiratory distress.      Breath sounds: Normal breath sounds. No stridor. No wheezing, rhonchi or rales.   Chest:      Chest wall: No tenderness.   Abdominal:      General: Bowel sounds are normal. There is no distension.      Palpations: Abdomen is soft. There is no mass.      Tenderness: There is no abdominal tenderness. There is no rebound.      Hernia: No hernia is present.   Genitourinary:     Comments: Rectal exam differed.  Musculoskeletal:         General: No swelling or tenderness. Normal range of motion.      Cervical back: Normal range of motion and neck supple. No tenderness.   Lymphadenopathy:      Cervical: No cervical adenopathy.      Upper Body:      Right upper body: No supraclavicular adenopathy.      Left upper body: No supraclavicular adenopathy.      Lower Body: No right inguinal adenopathy. No left inguinal adenopathy.   Skin:     General: Skin is warm and dry.      Coloration: Skin is not pale.      Findings: No erythema or rash.   Neurological:      General: No focal deficit present.      Mental Status: He is alert and  oriented to person, place, and time.      Cranial Nerves: No cranial nerve deficit.      Sensory: No sensory deficit.      Motor: No weakness.      Coordination: Coordination normal.   Psychiatric:         Mood and Affect: Mood normal.         Behavior: Behavior normal.         Thought Content: Thought content normal.         Judgment: Judgment normal.        Radiology Results: I have reviewed radiology images/reports described above.       Administrative Statements   I have spent a total time of 55 minutes in caring for this patient on the day of the visit/encounter including Diagnostic results, Prognosis, Risks and benefits of tx options, Instructions for management, Patient and family education, Importance of tx compliance, Risk factor reductions, Impressions, Counseling / Coordination of care, Documenting in the medical record, Reviewing / ordering tests, medicine, procedures  , and Obtaining or reviewing history  .

## 2025-01-15 NOTE — PROGRESS NOTES
Jose Becker 1955 is a 69 y.o. maleWith newly diagnosed Gleason7 ( 3+4 ) prostate adenocarcinoma. Presents in consultation for discussion of RT.   Referred by .      PMH includes: STEMI,  CAD, ED, hypoglossal nerve stimulator, MI, and bilateral knee replacements.      PS   Latest Ref Rng 0.000 - 4.000 ng/mL   1/23/2020 1.5    7/27/2021 2.2    8/16/2022 2.5    12/5/2023 2.98 lvh   8/26/2024 4.120 (H)    8/29/2024 4.027 (H)    **called to repeat PSA    9/17/24 MRI prostate  IMPRESSION:   1. PI-RADSv2.1 Category 4 - High (clinically significant cancer is likely to be present). Left apical medial peripheral zone lesion.   2. No extraprostatic tumor, seminal vesicle invasion, pelvic lymphadenopathy, or pelvic osseous metastatic disease.   3. Calculated prostate volume of 23 mL.     12/11/24 Tissue Exam  A. Prostate, Right anterior medial, Biopsy:  - Benign prostatic tissue      B. Prostate, Right Anterior lateral, Biopsy:  - Atypical small acinar proliferation (ASAP)      C. Prostate, Right posterior lateral, Biopsy:  - Atypical small acinar proliferation (ASAP)      D. Prostate, Left anterior medial, Biopsy:  - Benign prostatic tissue      E. Prostate, Left anterior lateral, Biopsy:  - Benign prostatic tissue      F. Prostate, Left posterior lateral, Biopsy:  - Atypical small acinar proliferation (ASAP)      G. Prostate, Left posterior medial, Biopsy:  - Prostatic adenocarcinoma, acinar type, Cat score 3+3=6, Prognostic Grade Group 1, involving 1 of 2 cores (20% overall involvement)   - Perineural invasion: Not identified       H. Prostate, Left base, Biopsy:  - Atypical small acinar proliferation (ASAP)   - High-grade prostatic intraepithelial neoplasia (HGPIN)      I. Prostate, Right posterior medial, Biopsy:  - Benign prostatic tissue      J. Prostate, Right base, Biopsy:  - High-grade prostatic intraepithelial neoplasia (HGPIN)       K. Prostate, SHAAN #1 left posterior peripheral zone, Biopsy:  -  Prostatic adenocarcinoma, acinar type, Cat score 3+4=7, Prognostic Grade Group 2, involving 4 of 4 cores (20% overall involvement)   - Percentage of Cat pattern 4: <5%  - Perineural invasion: Present    24   Pathology reviewed.  Stage T1 with perineural invasion and acinar carcinoma.  Treatment options discussed.  Not a surgical candidate.  Will refer to radiation oncology for discussion.  Decipher testing ordered to determine candidacy for active surveillance.    25  (telephone Call)  Decipher test showed intermediate risk; treatment recommended.  SpaceOar and fiducial markers discussed.      Upcomin25  Urology        Oncology History Overview Note   With newly diagnosed Gleason7 ( 3+4 ) prostate adenocarcinoma. Presents in consultation for discussion of RT.   Referred by .      PMH includes: STEMI,  CAD, ED, hypoglossal nerve stimulator 24, MI, and bilateral knee replacements.      PS   Latest Ref Rng 0.000 - 4.000 ng/mL   2020 1.5    2021 2.2    2022 2.5    2023 2.98 lvh   2024 4.120 (H)    2024 4.027 (H)    **called to repeat PSA    24 MRI prostate  IMPRESSION:   1. PI-RADSv2.1 Category 4 - High (clinically significant cancer is likely to be present). Left apical medial peripheral zone lesion.   2. No extraprostatic tumor, seminal vesicle invasion, pelvic lymphadenopathy, or pelvic osseous metastatic disease.   3. Calculated prostate volume of 23 mL.     24 Tissue Exam  A. Prostate, Right anterior medial, Biopsy:  - Benign prostatic tissue      B. Prostate, Right Anterior lateral, Biopsy:  - Atypical small acinar proliferation (ASAP)      C. Prostate, Right posterior lateral, Biopsy:  - Atypical small acinar proliferation (ASAP)      D. Prostate, Left anterior medial, Biopsy:  - Benign prostatic tissue      E. Prostate, Left anterior lateral, Biopsy:  - Benign prostatic tissue      F. Prostate, Left posterior  lateral, Biopsy:  - Atypical small acinar proliferation (ASAP)      G. Prostate, Left posterior medial, Biopsy:  - Prostatic adenocarcinoma, acinar type, [place] score 3+3=6, Prognostic Grade Group 1, involving 1 of 2 cores (20% overall involvement)   - Perineural invasion: Not identified       H. Prostate, Left base, Biopsy:  - Atypical small acinar proliferation (ASAP)   - High-grade prostatic intraepithelial neoplasia (HGPIN)      I. Prostate, Right posterior medial, Biopsy:  - Benign prostatic tissue      J. Prostate, Right base, Biopsy:  - High-grade prostatic intraepithelial neoplasia (HGPIN)       K. Prostate, SHAAN #1 left posterior peripheral zone, Biopsy:  - Prostatic adenocarcinoma, acinar type, [place] score 3+4=7, Prognostic Grade Group 2, involving 4 of 4 cores (20% overall involvement)   - Percentage of [place] pattern 4: <5%  - Perineural invasion: Present    24   Pathology reviewed.  Stage T1 with perineural invasion and acinar carcinoma.  Treatment options discussed.  Not a surgical candidate.  Will refer to radiation oncology for discussion.  Decipher testing ordered to determine candidacy for active surveillance.    25  (telephone Call)  Decipher test showed intermediate risk; treatment recommended.  SpaceOar and fiducial markers discussed.      Upcomin25  Urology         Prostate cancer (HCC)   2024 Initial Diagnosis    Prostate cancer (HCC)     2024 Biopsy    A. Prostate, Right anterior medial, Biopsy:  - Benign prostatic tissue      B. Prostate, Right Anterior lateral, Biopsy:  - Atypical small acinar proliferation (ASAP)      C. Prostate, Right posterior lateral, Biopsy:  - Atypical small acinar proliferation (ASAP)      D. Prostate, Left anterior medial, Biopsy:  - Benign prostatic tissue      E. Prostate, Left anterior lateral, Biopsy:  - Benign prostatic tissue      F. Prostate, Left posterior lateral, Biopsy:  - Atypical small acinar  proliferation (ASAP)      G. Prostate, Left posterior medial, Biopsy:  - Prostatic adenocarcinoma, acinar type, [place] score 3+3=6, Prognostic Grade Group 1, involving 1 of 2 cores (20% overall involvement)   - Perineural invasion: Not identified       H. Prostate, Left base, Biopsy:  - Atypical small acinar proliferation (ASAP)   - High-grade prostatic intraepithelial neoplasia (HGPIN)      I. Prostate, Right posterior medial, Biopsy:  - Benign prostatic tissue      J. Prostate, Right base, Biopsy:  - High-grade prostatic intraepithelial neoplasia (HGPIN)       K. Prostate, SHAAN #1 left posterior peripheral zone, Biopsy:  - Prostatic adenocarcinoma, acinar type, [place] score 3+4=7, Prognostic Grade Group 2, involving 4 of 4 cores (20% overall involvement)   - Percentage of Cat pattern 4: <5%  - Perineural invasion: Present         Review of Systems:  Review of Systems   Constitutional:  Negative for activity change, appetite change and fatigue.   HENT:  Positive for tinnitus.    Eyes: Negative.    Respiratory: Negative.     Cardiovascular: Negative.    Gastrointestinal:  Negative for blood in stool, constipation, diarrhea, nausea and vomiting.   Endocrine: Negative.    Genitourinary:  Positive for difficulty urinating, frequency and urgency. Negative for dysuria and hematuria.   Musculoskeletal: Negative.    Skin: Negative.    Allergic/Immunologic: Negative.    Neurological:  Positive for numbness (Reports neuropathy in hands).   Hematological:  Bruises/bleeds easily.   Psychiatric/Behavioral: Negative.         Clinical Trial: no    IPSS Questionnaire (AUA-7):  Over the past month…    1)  How often have you had a sensation of not emptying your bladder completely after you finish urinating?  5 - Almost always   2)  How often have you had to urinate again less than two hours after you finished urinating? 3 - About half the time   3)  How often have you found you stopped and started again several times when you  urinated?  3 - About half the time   4) How difficult have you found it to postpone urination?  5 - Almost always   5) How often have you had a weak urinary stream?  3 - About half the time   6) How often have you had to push or strain to begin urination?  1 - Less than 1 time in 5   7) How many times did you most typically get up to urinate from the time you went to bed until the time you got up in the morning?  3 - 3 times   Total Score:  23       PSA Script was mailed to patient, reported did not receive in time, script given today.          Prior Radiation none     Teaching NCL booklet given, SIM,Side effects    MST completed     Implantable Devices (Port, pacemaker, pain stimulator) inspire device     Hip Replacement none     Health Maintenance   Topic Date Due    Medicare Annual Wellness Visit (AWV)  Never done    Depression Screening  Never done    BMI: Followup Plan  Never done    RSV Vaccine Age 60+ Years (1 - Risk 60-74 years 1-dose series) Never done    Fall Risk  Never done    COVID-19 Vaccine (5 - 2024-25 season) 09/01/2024    BMI: Adult  12/23/2025    Colorectal Cancer Screening  06/25/2030    Hepatitis C Screening  Completed    Zoster Vaccine  Completed    Pneumococcal Vaccine: 65+ Years  Completed    Influenza Vaccine  Completed    Meningococcal B Vaccine  Aged Out    RSV Vaccine age 0-20 Months  Aged Out    HIB Vaccine  Aged Out    IPV Vaccine  Aged Out    Hepatitis A Vaccine  Aged Out    Meningococcal ACWY Vaccine  Aged Out    HPV Vaccine  Aged Out       Past Medical History:   Diagnosis Date    Allergic rhinitis     Arthritis     Asthma     COVID-19 12/2020    Heart attack (HCC)     Hypertensive disorder 7/15/2013    Myocardial infarction (HCC)     2012 / 2018    S/P insertion of hypoglossal nerve stimulator 6/27/2024    Sleep apnea     does not use C-PAP       Past Surgical History:   Procedure Laterality Date    CORONARY ARTERY BYPASS GRAFT  2012    2 stents..2018..stent / 2012 3 vessel bypass     EXAMINATION UNDER ANESTHESIA N/A 2020    Procedure: DRUG INDUCED SLEEP ENDOSCOPY;  Surgeon: Nasir Darling MD;  [facility]: [facility];  Service: ENT    JOINT REPLACEMENT      TX OPEN IMPLANTATION CRANIAL NERVE SCOOBY & PULSE GEN N/A 2021    Procedure: INSERTION UPPER AIRWAY STIMULATOR, INSPIRE IMPLANT;  Surgeon: Nasir Darling MD;  Location: [facility];  Service: ENT    TX PROSTATE NEEDLE BIOPSY ANY APPROACH N/A 2024    Procedure: TRANSPERINEAL MRI FUSION BX PROSTATE;  Surgeon: Salas Duvall MD;  Location: [facility];  Service: Urology    REPLACEMENT TOTAL KNEE BILATERAL      ROTATOR CUFF REPAIR         Family History   Problem Relation Age of Onset    Cancer Brother     Melanoma Brother        Social History     Tobacco Use    Smoking status: Former     Current packs/day: 0.00     Average packs/day: 0.5 packs/day for 6.0 years (3.0 ttl pk-yrs)     Types: Cigarettes     Start date:      Quit date:      Years since quittin.0    Smokeless tobacco: Former   Vaping Use    Vaping status: Never Used   Substance Use Topics    Alcohol use: Not Currently    Drug use: Never          Current Outpatient Medications:     Alirocumab (PRALUENT SC), , Disp: , Rfl:     aspirin 81 MG tablet, Take 1 tablet by mouth daily, Disp: , Rfl:     budesonide-formoterol (Symbicort) 160-4.5 mcg/act inhaler, Inhale by inhalation route for 90 days. (Patient not taking: Reported on 2024), Disp: , Rfl:     chlorhexidine (PERIDEX) 0.12 % solution, , Disp: , Rfl:     Cholecalciferol 50 MCG ( UT) CAPS, Daily, Disp: , Rfl:     clindamycin (CLEOCIN) 300 MG capsule, , Disp: , Rfl:     clopidogrel (PLAVIX) 75 mg tablet, clopidogrel 75 mg tablet, Disp: , Rfl:     DULoxetine (CYMBALTA) 30 mg delayed release capsule, Take by oral route for 30 days. (Patient not taking: Reported on 2024), Disp: , Rfl:     gabapentin (NEURONTIN) 300 mg capsule, , Disp: , Rfl:     gabapentin (NEURONTIN) 300 mg capsule, Take 1  capsule by mouth every 8 (eight) hours, Disp: , Rfl:     levothyroxine 100 mcg tablet, 100 mcg, Disp: , Rfl:     lidocaine (LIDODERM) 5 %, Place 1 patch on the skin every 24 hours, Disp: , Rfl:     metoprolol succinate (TOPROL-XL) 25 mg 24 hr tablet, Toprol XL 25 mg tablet,extended release, Disp: , Rfl:     mometasone (ELOCON) 0.1 % lotion, Apply topically as needed (2 drops each ear canal once daily as needed) 2 drops to each ear canal as needed, Disp: 60 mL, Rfl: 3    montelukast (SINGULAIR) 10 mg tablet, montelukast 10 mg tablet, Disp: , Rfl:     nitroglycerin (NITROSTAT) 0.4 mg SL tablet, , Disp: , Rfl:     Omega-3 Fatty Acids (OMEGA-3 FISH OIL PO), 1 capsule every day by oral route., Disp: , Rfl:     OMEGA-3 FATTY ACIDS PO, 360mg, Disp: , Rfl:     omeprazole (PriLOSEC) 20 mg delayed release capsule, , Disp: , Rfl:     tadalafil (CIALIS) 5 MG tablet, Take 1 tablet (5 mg total) by mouth in the morning, Disp: 90 tablet, Rfl: 1    tiotropium (Spiriva Respimat) 1.25 MCG/ACT AERS inhaler, , Disp: , Rfl:     Allergies   Allergen Reactions    Sulfa Antibiotics Hives    Clarithromycin Rash    Penicillins Hives and Rash        There were no vitals filed for this visit.

## 2025-01-15 NOTE — TELEPHONE ENCOUNTER
Patient has been seen for RT consult by Dr. Matson at [facility].  No ADT.    He is requesting  fiducial markers and SpaceOar. Next follow up appointment with  (urology) is 6/30/25.       Ct Simulation at [facility].

## 2025-01-15 NOTE — ASSESSMENT & PLAN NOTE
Orders:    Ambulatory Referral to Radiation Oncology    Radiation Simulation Treatment  Jose Becker 1955 is a 69 y.o. male diagnosed with Cat 7( 3+4 ) prostate adenocarcinoma after an elevated PSA of 4.12 in August 2024.  He had prostate MRI on September 17, 2024 that was category 4 with a left apical medial peripheral zone lesion.  There was no evidence of extraprostatic tumor with no seminal vesicle invasion, no pelvic lymphadenopathy, and no pelvic osseous metastatic disease.  He has prostate biopsies were positive including the region of interest for Cat score 3+4=7 disease in addition to [place] score 3+3=6 disease.  There was perineural invasion identified.  He has clinical stage IIB, T1c, N0, M0 disease.  He saw Dr. Ruiz who ordered decipher testing that came back intermediate risk.  He is seen today for consultation for radiation therapy.  We discussed treatment options with him to include active surveillance, robotic prostatectomy, and definitive radiation therapy.  With him having intermediate risk disease, he is not comfortable with active surveillance and would like to pursue treatment.  Given his cardiac disease, he is not a good candidate for surgery and furthermore does not wish to pursue surgical resection.  We recommend definitive external beam radiation therapy with the placement of fiducial markers for daily image guided radiation therapy which will help to improve the accuracy of treatment and reduce side effects.  We also recommend placement of a SpaceOAR to reduce dose to the rectum.  We discussed the acute side effects and the potential chronic complications of radiation therapy with him. He does consent to proceed with the treatment.  We also discussed that a short course androgen deprivation therapy could be considered but due to the potential side effects he would like to pursue definitive radiation therapy alone.  He will return for simulation and treatment planning  purposes in the [facility] after placement of fiducial markers and SpaceOAR.

## 2025-01-16 LAB — SL AMB PSA, TOTAL: 3.2 NG/ML

## 2025-01-20 ENCOUNTER — PREP FOR PROCEDURE (OUTPATIENT)
Dept: UROLOGY | Facility: MEDICAL CENTER | Age: 70
End: 2025-01-20

## 2025-01-20 DIAGNOSIS — C61 PROSTATE CANCER (HCC): Primary | ICD-10-CM

## 2025-01-23 ENCOUNTER — TELEPHONE (OUTPATIENT)
Dept: UROLOGY | Facility: MEDICAL CENTER | Age: 70
End: 2025-01-23

## 2025-01-23 ENCOUNTER — PREP FOR PROCEDURE (OUTPATIENT)
Dept: UROLOGY | Facility: MEDICAL CENTER | Age: 70
End: 2025-01-23

## 2025-01-23 ENCOUNTER — DOCUMENTATION (OUTPATIENT)
Dept: HEMATOLOGY ONCOLOGY | Facility: CLINIC | Age: 70
End: 2025-01-23

## 2025-01-23 DIAGNOSIS — C61 PROSTATE CANCER (HCC): Primary | ICD-10-CM

## 2025-01-23 DIAGNOSIS — Z01.812 PRE-OPERATIVE LABORATORY EXAMINATION: ICD-10-CM

## 2025-01-23 DIAGNOSIS — R39.89 SUSPECTED UTI: ICD-10-CM

## 2025-01-23 NOTE — TELEPHONE ENCOUNTER
Patient called for SS. He wanted to verify the date and location of his procedure. I did verify that information with him but he still has questions as far as when he'll be able to start treatment and who will help him set that up. I told him he would get a call back.

## 2025-01-23 NOTE — TELEPHONE ENCOUNTER
Spoke with patient and confirmed surgery date of: 4/16/25  Type of surgery: SpaceOAR/Marker  Operating physician: Dr. Sánchez  [facility] of [facility]     Verbally went over prep with patient;s wife Mindy on: 1/22/25  NPO   Bowel prep? Yes, 1 enema 1 hour prior to leaving the house for the procedure   Hospital calls afternoon prior with arrival time -Calls Friday afternoon for Monday surgeries   Patient needs ride to and from surgery (outpatient)    Pre-op testing to be done 2 weeks prior to surgery:  CBC,CMP,U/C    Blood thinners: Plavix Dr. Tommy Bautista, fax # [phone]    Clearances needed: none     Mailed packet on: 1/23/25  Copy of packet scanned into Media on: 1/23/25  Labs in packet   Soap instructions in packet    Post-op in packet   H&P on admit   PO         Consent: On admit

## 2025-01-23 NOTE — PROGRESS NOTES
Patient is scheduled for SpaceOAR/Fiducial markers on 4/16/25. Date was sent from urology surgery scheduler to RAD ONC office to schedule SIM. I will follow up on date.

## 2025-01-24 NOTE — TELEPHONE ENCOUNTER
Returned patient's call per message below, patient was busy during the time of call and he will call me back later. Provided him with our call back number.

## 2025-02-13 ENCOUNTER — PATIENT OUTREACH (OUTPATIENT)
Dept: CASE MANAGEMENT | Facility: OTHER | Age: 70
End: 2025-02-13

## 2025-02-13 ENCOUNTER — PATIENT OUTREACH (OUTPATIENT)
Dept: HEMATOLOGY ONCOLOGY | Facility: CLINIC | Age: 70
End: 2025-02-13

## 2025-02-13 DIAGNOSIS — C61 PROSTATE CANCER (HCC): Primary | ICD-10-CM

## 2025-02-13 NOTE — PROGRESS NOTES
I reached out and spoke with Jose, now that consults have been completed with the oncology teams. I introduced myself and explained my role as their Patient Navigator. I reviewed for any barriers to care and offered supportive services as needed. I reviewed and updated the members assigned to the care team in [facility]. He knows the members of the care team as well as how and when to contact them with any needs.     Distress Thermometer completed at this time. Patient scored 4/10. Referral to  placed..     He is currently able to drive and denies any transportation needs.      He denies any uncontrolled symptoms. Discussed role of Palliative Care in symptom and side effect management. Declined referral at this time.    He states that he is eating and drinking as per usual with no unintentional weight loss.       Patient does not smoke.     He states he is well supported by family and friends.  Community support groups discussed including the Cancer Support Community of the [facility]. Patient declined information at this time.     He feels he has inadequate financial resources related to bills. Patient has been working with Oncology Social Worker.     He verbalizes managing the schedules well.   Future Appointments   Date Time Provider Department Center   2/20/2025  1:30 PM Natalie Beatty PA-C [facility]  [facility]   2/20/2025  2:00 PM Yara Berman [facility]  [facility]   3/20/2025  1:50 PM Bucky Bob, DO [facility]   6/30/2025  2:15 PM Hong Ruiz MD [facility]      He is having frequent urination. Some constipation at times.    Based on individual needs I will follow up in about 4 weeks. I have provided my direct contact information and welcome them to contact me if needs as discussed above change. He was appreciative for the call.

## 2025-02-17 ENCOUNTER — PATIENT OUTREACH (OUTPATIENT)
Dept: CASE MANAGEMENT | Facility: OTHER | Age: 70
End: 2025-02-17

## 2025-02-17 NOTE — PROGRESS NOTES
Biopsychosocial and Barriers Assessment    Type of Cancer: Prostate  Treatment plan: Spacer 4/16/25 patient will have radiation   Noted barriers to care: possibly transportation  Cultural/Anabaptist concerns: none  Hair Loss/ Wig resources needed: none    DT completed: yes  DT score:4/10  Issues noted: sleep, worry/anxiety, sadness/depression, finances, enough food     Marital status/Lives with:, resides with spouse  Pt's support system: spouse. Spouse works for a [employer] which is also a support   Mental Health history: denies   Substance Abuse: denies     Employment/income source: retired but spouse works   Concerns with bills (treatment vs household): repots that they aren't currently behind on bills but they have to juggle things to make it work  Noted issues with home: none    Narrative note:  OSW outreached to patient assessment completed. Patient reported that finances is a concern especially when he starts radiation because he will have to commute so far and frequently. Patient reported that he has an older car with higher mileage and he is worried about whether or not he his car will make it. Discussed possibility of transportation assistance if needed.     OSW will look for resources to send to patient to assist with costs.       Message sent to patient via Mychart    Helgerard Mr. Becker,    Here are the resources that I was able to find for you. If you need additional resources, please feel free to reach out to me for assistance at [phone].    Have a good day!  Ina Garber Vital Juan by: [employer] (Metanaut[employer])  Next Steps:    Apply on their website, https://www.vitalMatch.org/juan-application.   About: The juan program will assist those facing a significant financial hardship due to the diagnosis of a chronic, terminal or rare condition such as cancer, Parkinson’s, Edmore’s Disease or many others.    This program provides:    - Utility assistance  - Grocery  assistance    Please note, this kelvin will only cover utilities OR groceries. Additionally, [employer] (Mapado[employer]) partners with organizations that might offer other assistance.  Eligibility: This program serves those facing a significant financial hardship due to the diagnosis of a chronic, terminal or rare condition such as cancer, Parkinson’s, Cadiz’s Disease or many others. Must select assistance with utilities OR groceries.  Nearest location: 44.66 miles away.  [employer]  [address] [place] [address]  [address]  [place] [address]   [phone]  Hours:  Monday:08:00 AM - 05:00 PM  Tuesday:08:00 AM - 05:00 PM  Wednesday:08:00 AM - 05:00 PM  Thursday:08:00 AM - 05:00 PM  Friday:08:00 AM - 05:00 PM  Low-Income Home Energy Assistance Program (LIHEAP) by: [facility]  Next Steps:    Call [phone] to get more info.      Apply on their website, https://www.pa.gov/en/services/dhs/apply-for-the-low-income-home-energy-assistance-program-liheap.html.   About: LIHEAP helps families living on low incomes pay their heating bills in the form of a cash kelvin. Crisis grants also are for households in immediate danger of being without heat. With cash grants, a one-time payment is sent directly to the utility company/fuel provider, and will be credited on your bill.    This program provides:    - Utility Assistance    To apply:    - Online by using COMPASS  - Printing the application from our website and returning it to your local county assistance office  - In person at your local county assistance office  Eligibility: Must meet annual income guidelines.  Nearest location: 78.61 miles away.  [facility]  [address] [place] [address]  Hours:  Monday:08:00 AM - 05:00 PM  Tuesday:08:00 AM - 05:00 PM  Wednesday:08:00 AM - 05:00 PM  Thursday:08:00 AM - 05:00 PM  Friday:08:00 AM - 05:00 PM  Road to Recovery by: American Cancer  Society  Next Steps:    Call [phone] to get services.   About: The American Cancer Society Road To Recovery program provides free ground transportation to and from CANCER-RELATED medical appointments FOR PEOPLE WITH CANCER who do not have a ride or are unable to drive themselves. Depending on each individual's needs and what’s available in their area, we may be able to coordinate a ride with an American Cancer Society volunteer . *MUST HAVE CANCER DIAGNOSIS TO QUALIFY.*    This program provides:    - Transportation for healthcare    Please call us at [phone] to learn additional eligibility criteria. It can take several business days to coordinate your ride, so please call well in advance of your appointment date.    Road to Recovery is available in the following [facility]:    Dougiema  David (AL), Roger (AL), Cale (AL), Janny (AL)    [facility] (AK), [facility] (AK)    [facility] (AZ), McIntyre (AZ)    [place]  Huddleston (AR), Redwood (AR), Chester (AR), Saline (AR)    [place]  Columbus (CA), Winchester (CA), Mission Viejo (CA), Pottsboro (CA), West Jordan (CA), [facility] (CA), Geuda Springs (CA), Lake (CA), Saguache (CA), [facility] (CA), Poestenkill (CA), Kempner (CA), [facility] (CA), Voca (CA), Mono (CA), Kay (CA), Sellersburg (CA), Ontonagon (CA), Miami (CA), Darlington (CA), Mellen (CA), Napoleonville (CA), Oswego (CA), Rocky (CA), Boswell (CA), Roxie (CA), Chisago (CA), Brant (CA), Dunn (CA), Rodas (CA), Kankakee (CA)    [place]  Nair (CO), Laura (CO), Oldham (CO), Greenville (CO), Denver (CO), David (CO), Marco A (CO), Phelps (CO), Cale (CO), Kim (CO), Shanna (CO), Marielos (CO)    [facility] (CT), Davis (CT), Genesee (CT), Warriormine (CT), Blairstown (CT), Esparto (CT), Ingomar (CT), South Bloomingville (CT)    Levine, Susan. \Hospital Has a New Name and Outlook.\"" (DC)    [facility] (DE), Marengo (DE), Tenino (DE)    Courtney Millerua  (FL), Graves (FL), Bay (FL), Goff (FL), Clarke (FL), San Francisco (FL), Villavicencio (FL), Fay (FL), Darryn (FL), Lincoln (FL), Mico (FL), Bracken (FL), Mattoon (FL), Avery (FL), Stoddard (FL), North Judson (FL), Jaden (FL), Campbell (FL), Latimer (FL), Yolo (FL), Rockvale (FL), Quezada (FL), Beckham (FL), Windsor (FL), Spencer (FL), Portland (FL), Choteau (FL), Bethesda (FL), Tyrell (FL), Cale (FL), Virgil (FL), Cronin (FL), Karthik (FL), Negro (FL), Isaacs (FL), Homer (FL), Ocean View (FL), [facility] (FL), Angela (FL), Clarke (FL), Miami-Garth (FL), Soto (FL), Darien (FL), Yakima (FL), Pensacola (FL), Orange (FL), Freestone (FL), Cuyahoga (FL), McLeod (FL), Wirt (FL), Namita (FL), Harrisonburg (FL), Saint Johns (FL), Saint Lucie (FL), Novi (FL), Gilpin (FL), Cumberland Gap (FL), Palm Beach (FL), Charles Mix (FL), Margoth (FL), Union (FL), Roanoke (FL), Klickitat (FL), Hinojosa (FL), Washington (FL)    [place]  Clearfield (GA), Jerzy (GA), Giorgi (GA), Butts (GA), Alysha (GA), Chambers (GA), Phylicia (GA), Shepherd (GA), Eusebio (GA), Madrigal (GA), Mico (GA), Churchill (GA), Sharps (GA), David (GA), Hand (GA), Hinsdale (GA), Nebo (GA), Hieu (GA), Cache (GA), Jaden (GA), Can (GA), Mick (GA), Keyanna (GA), Maxwell (GA), Jordan (GA), Alexey (GA), Josh (GA), Dinh (GA), Tyrell (GA), Meño (GA), Dk (GA), Patti (GA), Jairo (GA), Marybeth (GA), Angela (GA), Soto (GA), Capitan Grande Band (GA), Silva (GA), Porter (GA), Maple Plain (GA), Goose Lake (GA), Namita (GA), Holly (GA), Sea (GA), Marcella (GA), Richmond (GA), Yesenia (GA), Margoth (GA), Mellisa (GA), Kathleen (GA), Ashish (GA), Washington (GA), White (GA)    [facility] (HI), Shoup (HI), Whitfield (Hi), Debbie (HI)    [place]  Ada (ID), Sacramento (ID), Copper River (ID), O'Brien (ID), Aiken (ID), Danville (ID)    [place]  Kamara (IL), Croydon (IL), Leroy (IL), Tariq (IL), Med (IL), Hamlet (IL), Chuck (IL), Mehrdad (IL), Nico (IL), Austin (IL), Sujit (IL), Christiano  (IL), Jairo (IL), Marybeth (IL), Silver (IL), Soto (IL), Javi (IL), Manistee (IL), Dade (IL), Petty (IL), Saint Maria M (IL), Cabarrus (IL), Baldwin (IL), [facility] (IL), Rutland (IL), Aniak (IL)    Maricruz Butler (IN), Hieu (IN), Damien (IN), Blas (IN), Tobi (IN), Channing (IN), Cronin (IN), Angela (IN), Javi (IN), Parker (IN), Janny (IN), Texas City (IN), [facility] (IN), Severiano (IN)    [place]  Flakito Freeman (IA), Gutierrez (IA), Channing (IA), Meseret (IA), Jero (IA), St. Clair (IA), Cachorro (IA), Jean Carlos (IA), Janny (IA),    [place]  David (KS), Channing (KS), Fabian (KS), Diana (KS), Keller (KS)    Mariola Kelly (KY), Kemal (KY), Tasha (KY), Cale (KY), Rita (KY), Buddy (KY), Mamta (KY), Jean Carlos (KY), Riddhi (KY)    [place]  Sandra (LA), Hemal (LA), Cale (LA), Laurel Hill (LA)    [facility] (ME), Blas (ME), Richard (ME), Wasco (ME)    [place]  Kira MONTERO), Federico (MD), North East City (MD), Karen MONTERO), Felipe MONTERO), Terrence MONTERO), Iglesia MONTERO), Kary MONTERO), Ousmane MONTERO), Cris MONTERO), Isiah MONTERO), Jeromy MONTERO), Prince Charlie MONTERO), Queen Nenita MONTERO), Dominic MONTERO), Yesenia MONTERO), Millie MONTERO), Katja MONTERO), Washington (MD)    [facility] (MA), Bayamon (MA), Trempealeau (MA), Essex (MA), Dickey (MA), Homestead (MA), Doyle (MA), Wareham (MA), Cole Camp (MA), Woodruff (MA), East Randolph (MA), Northport (MA)    [facility] (MI), Sabine (MI), Villavicencio (MI), McK[facility] Hospital[facility] (MI), Maxwell (MI), [facility] (MI), [facility] (MI), Tyrell (MI), Ventura (MI), Edgardo (MI), Schley (MI), Moca (MI), Concord (MI), Dixie (MI), La Joya (MI), Pinson (MI), Andrews (MI), Saint Joseph (MI), Biloxi (MI), [facility] (MI), Maramec (MI)    [place]  Barry (MN), Hayes (MN), Darryn (MN), Tao (MN), London (MN), Kristan (MN), Junior (MN), Saint Louis (MN), Jean Carlos (MN), Washington (MN), Cole (MN)    [place]  Asad (MO), Ashwin (MO), Darryn (MO),  Jaden (MO), Tyrell (MO), Cale (MO), Grimes (MO), Saint Charles (MO), Saint Louis (MO), [facility] (MO), Saint Louis City (MO)    [facility] (MS), [facility] (MS), Marybeth (MS)    [place]  Knox City (MT), Carbon (MT), El Paso (MT), Vieques (MT), Port O'Connor (MT), Burlington (MT), York (MT), New London (MT), Gonzales (MT), Richardson (MT)    [facility] (NE), Castile (NE), Mount Enterprise (NE), David (NE), Wilfred (NE), Hodges (NE), CataÃ±o (NE), Washington (NE)    Lina Butler (NV), Serge (NV), Donalsonville (NV), Surgical [facility] (NV), Brooks (NV)    [facility] (NH), Merced (NH), Solana Beach (NH), Witter Springs (NH), Bradenton (NH), Nooksack (NH)    General acute hospital (NJ), Siskiyou (NJ), Northboro (NJ), Roxbury (NJ), Remsen (NJ), Wasco (NJ), Essex (NJ), Bowie (NJ), Carter (NJ), Otoe (NJ), Silver (NJ), Acton (NJ), Calhoun (NJ), Ab (NJ), Poweshiek (NJ), Mead (NJ), Valencia (NJ), Alameda (NJ), Cambridge (NJ), Union (NJ), Peter (NJ)    [place]  Screven (NM), Guerrero (NM)    [place]  Tift (NY), Tatyana (NY), Central Point (NY), Powell (NY),  (NY), Lance (NY), Juliana (NY), Briceño (NY), Marybeth (NY), Soto (NY), Lake Alfred (NY), Republic (NY), Rincon (NY), Orange (NY), Crook (NY), Le Flore (NY), Latha (NY), Shantel (NY), Patrice (NY), Livingston (NY), Waukesha (NY), Peter (NY), Washington (NY), Murrysville (NY), Wyoming (NY)    [facility] (NC), Francisc[facility] (NC), Waller (NC), San Antonio (NC), Sebastian (NC), Bakersfield (NC), Luquillo (NC), Alysha (NC), Neal (NC), Abigail (NC), Abdirizak (NC), Arie (NC), Mis (NC), Sarah (NC), Jaden (NC), Josesito (NC), Adalberto (NC), Marla (NC), Carlos, (NC), Lorraine (NC), Karthik (NC), Melodie (NC), Aureliano (NC), Avi (NC), José Miguel (NC), New Smithburg (NC), Orange (NC), Sullivan (NC), Person (NC), Negrito (NC), Baldeamr (NC), More (NC), Jenn (NC), Nikolas (NC), Cole (NC), Janet (NC), Arie (NC), Reggie (NC)    North  Tao  Duplin (ND)    [place]  Shahzad (OH), Larue (OH), Brown (OH), Carrera (OH), Bondville (OH), Bruneau (OH), Steven (OH), Eden (OH), Menominee (OH), Delaware (OH), Noxubee (OH), Radha (OH), Jaden (OH), Brianna (OH), Briceño (OH), Justen (OH), Quezada (OH), Odonnell (OH), Sven (OH), Lake (OH), Watrous (OH), Stalin (OH), Austin (OH), Nishant (OH), Marybeth (OH), Margoth (OH), Angela (OH), Persaud (OH), Pinzon (OH), Door (OH), Winkler (OH), CataÃ±o (OH), Corado (OH), Robbi (OH), Mechelle (OH), Chacorta Summers (OH), Jarod (OH), Wood (OH)    [facility] (OK), Brian Head (OK), Franklin (OK), Oklahoma (OK), Iliamna (OK), Wilfrido (OK), D Lo (OK), Dhaval (OK)    S[facility]als (OR), Franco (OR), Kenyatta (OR), Tyrell (OR), Megan (OR), Ty (OR), Meseret (OR), Angela (OR), Charisse (OR), Namita (OR), Washington (OR)    Alaina Nair (PA), Margo (PA), Vincenzo (PA), Rolan (PA), Gabby (PA), Saginaw (PA), Jasiel (PA), Goff (PA), Cimarron (PA), Debi (PA), St. Landry (PA), Carbon (PA), Fall Branch (PA), Christiano (PA), Saint Paul (PA), Steven (PA), Waukesha (PA), Bath (PA), Santa Ana (PA), Hartford (PA), Delaware (PA), Lance (PA), Tasha (PA), Jaden (PA), Torsten (PA), Indian (PA), Averill (PA), Lake Almanor Country Club (PA), Hodges (PA), Matteo (PA), Collier (PA), Palm Beach (PA), Mound City (PA), [facility] (PA), Faribault (PA), Silver (PA), Golden Valley (PA), Hobe Sound (PA), Cedar Mountain (PA), Minnesota Lake (PA), Walls (PA), Fair Bluff (PA), Union Star (PA), Lava Hot Springs (PA), Tere (PA), Russ (PA), Maddy (PA), Dominic (PA), Dakota (PA), Sherley (PA), Obed (PA), Lowell (PA), Holt (PA), Peter (PA), Washington (PA), Ipswich (PA), Charlotte (PA), Wyoming (PA), York (PA)    [facility] (RI), Wellington (RI), Washington (RI), Washington (RI)    [place]  Rayne (SC), Nathalie (SC), Aaron (SC), Guido (SC), Bradenton (SC), Kewaunee (SC), Phylicia (SC), Colleton (SC), Las Vegas (SC), Island Falls  (SC), Hancock (SC), Gardenia (SC), Moville (SC), Stuarts Draft (SC), Keota (SC), Ann (SC), Cameron (SC), Jersey (SC), Ara (SC), Hodges (SC), Hancock (SC), Conejos (SC), Angela (SC), Keith (SC), Seminole (SC), Dayton (SC), Kirkville (SC), Piute (SC), Muskingum (SC), Oglethorpe (SC), Minotola (SC), Rock Hall (SC), York (SC)    South Tao  Westport (SD), Silverstreet (SD)    [place]  Abdirizak (TN), Tasha (TN), Chuck (TN), Yeni (TN), Janny (TN), Dakota (TN), Michele (TN), Jim (TN), Washington (TN), Nayana (TN), Jose (TN)    [place]  Nathan (TX), Bryant (TX), Wylie (TX), Tucker (TX), Dayton (TX), Chambers (TX), Emanuel (TX), Ringgold (TX), Jas (TX), Sevierville (TX), [facility] (TX), Stutsman (TX), House (TX), Martinez (TX), Channing (TX), Houston (TX), Alexandria (TX), Persaud (TX), Jeromy (TX), Alexander (TX), Yukon-Koyukuk (TX), Bertie (TX), Rickie (TX), Nena (TX), Nayana (TX), Hdez (TX)    [facility] (UT), [facility] (UT), Grafton (UT), Utah (UT), Kim (UT)    [place]ont  Hansa (VT), Erlinda (VT), Camden (VT), Washington (VT), Southfield (VT), Sweetwater (VT)    [facility] (VA), Yared (VA) Lady Lake (VA), [facility] (VA), Sena (VA), Clifton (VA), Kyrie (VA), Rock Creek (VA), Hayesville (VA), [facility] (VA), Narrowsburg (VA), Richton (VA), Toledo (VA), [facility] (VA), San Juan (VA), Brooklyn (VA), [facility] (VA), Birnamwood (VA), Gladstone (VA), Central Lake (VA), Columbia (VA), Holgate (VA), [facility] (VA), Dwight (VA), Chappell Hill (VA), [facility] (VA), Imperial (VA), San Jacinto (VA), [facility] (VA), Alberton (VA), Philip (VA), Han[facility] (VA), West [facility] (VA), Tanacross (VA), Miller City (VA), Whitinsville (VA), [facility] (VA), Morriston (VA), [facility] (VA), [facility] (VA), Monticello (VA), Pinzon (VA), Brodie (VA), Shenandoah (VA), Rhode [facility] (VA), [facility] (VA), High[facility] Center (VA), Camden (VA), Puxico  [facility] (VA), Ath[facility] (VA), Jell[facility] (VA), Syracuse (VA), Jackson [facility] (VA), Hackett (VA), Ag[facility] (VA), Kenyon (VA), Horn [facility] (VA), Rolling Prairie (VA), Chicago (VA), Metamora (VA), Watertown (VA), [facility] (VA), [facility] (VA), Cornish (VA), Sutherland (VA), West Bloomfield (VA), North [facility] (VA), Washington (VA), [facility] (VA), [facility] Peabody (VA), [facility] (VA)    [place]  Deandre (WA), Luke (WA), Jose (WA), David (WA), Juan (WA), [facility] (WA), Eagarville (WA), Giuseppe (WA), Yancey (WA), Byron (WA), Evaristo (WA), Villafana (WA), Josephine (WA), Caddo (WA), Multnomah (WA), Hot Spring (WA), Paulding (WA)    [facility] (W), Bulmaro (W) Cale (WV), John (WV), Javi (WV), Jimmie (WV)    Mitch Ram (WI), Harman (WI), David (WI), Cale (WI), Vulcan (WI), Cave City (WI), Screven (WI), Ouray (Wi), McClain (WI), Benton (WI), Dallas (WI), Tucson (WI), Sparrows Point (WI)    [facility] ([place])  Eligibility: A caregiver may need to accompany a patient who cannot walk without help, or is under age 18. Patients must be traveling to a cancer-related medical appointment.  Nearest location: [phone] miles away.  [employer]  [address]  [address]  [place] [address]   [phone]   Note: The Cancer Helpline is available 24 hours a day, 365 days a year, with translation services for all languages. Hours for transportation are dependent on volunteer  availability.  Hours:  Sunday:12:00 AM - 11:45 PM  Monday:12:00 AM - 11:45 PM  Tuesday:12:00 AM - 11:45 PM  Wednesday:12:00 AM - 11:45 PM  Thursday:12:00 AM - 11:45 PM  Friday:12:00 AM - 11:45 PM  Saturday:12:00 AM - 11:45 PM  [facility] [employer] by: [facility]  Next Steps:    Call [phone] to get more info.   About: [employer] is a patient service team that takes pride in transporting our oncology patients to their physician  appointments and outpatient therapies. Our mission is to provide patient transportation, to and from appointments, for those who have a defined need for transportation assistance.    This program provides:    - Transportation    The need for transportation will be determined by the patient’s lack of access to public transportation, lack of valid ’s license, access to a vehicle, family and/or other support and medical restrictions from driving.    The last appointment of the day is taken at 2 pm. Start and end schedule times may vary dependent on patient needs.  Eligibility: This program helps people who are older than 17 years old. This program serves adult ambulatory Oncology patients of [facility]. Patient’s with exceptional needs require approval from [facility] and Transport Management. Transportation of children in a [employer] vehicle is prohibited.  Nearest location: 18.72 miles away.  [address] [address]  Hours:  Monday:07:30 AM - 03:00 PM  Tuesday:07:30 AM - 03:00 PM  Wednesday:07:30 AM - 03:00 PM  Thursday:07:30 AM - 03:00 PM  Friday:07:30 AM - 03:00 PM  [facility] Marketplace by: Tyesha  Next Steps:    Go to [email or web address] to apply.      Call [phone] to get more info.   About: - [facility] is [place]'s official health insurance marketplace and the only place for financial savings to reduce the cost of coverage and doctor visits.  - Tyesha is not an insurance company. Popular insurance companies sell their health plans on HIT Community.  - HIT Community allows you to shop, apply, and enroll in a comprehensive health plan for you and your family.  - Need help? Get assistance in-person, over the phone, chat, or virtually. Help is available in your preferred language.  - If you are deemed eligible for Medicaid or CHIP, HIT Community will automatically transfer your application.  - Tyesha is there  every step of the way to help you through the application and enrollment process.  - Call [employer] Customer Service at [phone] or visit brand eins Verlag/connect.    [employer] is for:  - Uninsured [place]ians  - [place]ians that do not get health insurance through work  - [place] that do not qualify for Medicaid    This program provides:  - Health Insurance  - Financial assistance  - Free Help  - Lowest costs on high quality health insurance  - Tyesha-Certified professions & knowledgeable Customer Service       Eligibility: Must be a resident of [place].  Nearest location: 78.84 miles away.  [employer]  [address] [address]  [place] [address]   [phone]   Note: Access to [email or web address], the application, or customer accounts is open 24/7. --- [employer] Service Call Center is open Monday-Friday 8am-6pm. (Hours subject to change for state holidays)  Hours:  Monday:08:00 AM - 06:00 PM  Tuesday:08:00 AM - 06:00 PM  Wednesday:08:00 AM - 06:00 PM  Thursday:08:00 AM - 06:00 PM  Friday:08:00 AM - 06:00 PM

## 2025-03-20 ENCOUNTER — OFFICE VISIT (OUTPATIENT)
Dept: SLEEP CENTER | Facility: CLINIC | Age: 70
End: 2025-03-20
Payer: MEDICARE

## 2025-03-20 VITALS
BODY MASS INDEX: 29.9 KG/M2 | HEIGHT: 71 IN | HEART RATE: 67 BPM | WEIGHT: 213.6 LBS | DIASTOLIC BLOOD PRESSURE: 65 MMHG | SYSTOLIC BLOOD PRESSURE: 111 MMHG | TEMPERATURE: 98.2 F | RESPIRATION RATE: 16 BRPM | OXYGEN SATURATION: 97 %

## 2025-03-20 DIAGNOSIS — G47.33 OSA (OBSTRUCTIVE SLEEP APNEA): Primary | ICD-10-CM

## 2025-03-20 DIAGNOSIS — Z96.82 S/P INSERTION OF HYPOGLOSSAL NERVE STIMULATOR: ICD-10-CM

## 2025-03-20 PROCEDURE — G2211 COMPLEX E/M VISIT ADD ON: HCPCS | Performed by: STUDENT IN AN ORGANIZED HEALTH CARE EDUCATION/TRAINING PROGRAM

## 2025-03-20 PROCEDURE — 99215 OFFICE O/P EST HI 40 MIN: CPT | Performed by: STUDENT IN AN ORGANIZED HEALTH CARE EDUCATION/TRAINING PROGRAM

## 2025-03-20 NOTE — PATIENT INSTRUCTIONS
-You are on Level 2 today. After 2 weeks, go up to level 3.   -Try placing a reminder by your light to turn the device on  -If that doesn't work, consider using an alarm clock for a set bedtime to remind you to turn it on.      PLEASE REMEMBER TO WRITE DOWN THE NIGHTS WHEN YOU HAVE THE DISCOMFORT WITH THE STIMULUS.         Please remember you must turn off (White button) or pause the Inspire device anytime you are eating, drinking, chewing, or swallowing as there is a risk of choking if you do so when the Inspire device is active.      Please bring your remote to each visit with me     If you have device questions in the middle of the night, please call Inspire support  ([phone])      For problems using Inspire, sleep issues, etc, please contact me in the office in the day or send a message via [employer]

## 2025-03-20 NOTE — ASSESSMENT & PLAN NOTE
Inspire Settings  Outgoing Settings  Electrode configuration A + - +   Amplitude (V) 2.5  Range (V) 2.4 to 3.4  Pulse width (us)/ Rate (Hz)  90/33  Start delay (min) 45  Pause Time (min) 30  Therapy Duration (hr) 8

## 2025-03-20 NOTE — ASSESSMENT & PLAN NOTE
Jose is a pleasant 69-year-old gentleman with a PMHx of CAD status post STEMI status post PTCA, HTN, cervical radiculopathy, macular degeneration, HLD, RLS, PLMD who presents in follow up for MILDRED (baseline AHI 23.7 in 2021) status post inspire implantation, activation 10/2021.  In truth, despite some of his concerns noted today, he actually is utilizing his device much more consistently as of today's appointment and he had been at previous appointments.  Nevertheless, he remains quite preoccupied with these (albeit now very rare, per his report) instances of discomfort from the device's activation overnight, as well as continuing to express some difficulty in remembering to use the device.    As of now, encouraged him to remain utilizing his device at his present level (level 2), then increase to level 3 after 2 weeks  We will consider a repeat inspire titration study at his next visit, if his usage remains consistent  We may consider any systems check at a future appointment, although as noted in previous visits, he very rarely, if ever, experiences difficulties with discomfort from the device at tested amplitudes when in the office  Did review the importance of remembering to utilize the device, including setting up reminders

## 2025-03-20 NOTE — PROGRESS NOTES
Name: Jose Becker      : 1955      MRN: [id number]  Encounter Provider: Bucky Bob DO  Encounter Date: 3/20/2025   Encounter department: [facility]  :  Assessment & Plan  MILDRED (obstructive sleep apnea)  Jose is a pleasant 69-year-old gentleman with a PMHx of CAD status post STEMI status post PTCA, HTN, cervical radiculopathy, macular degeneration, HLD, RLS, PLMD who presents in follow up for MILDRED (baseline AHI 23.7 in ) status post inspire implantation, activation 10/2021.  In truth, despite some of his concerns noted today, he actually is utilizing his device much more consistently as of today's appointment and he had been at previous appointments.  Nevertheless, he remains quite preoccupied with these (albeit now very rare, per his report) instances of discomfort from the device's activation overnight, as well as continuing to express some difficulty in remembering to use the device.    As of now, encouraged him to remain utilizing his device at his present level (level 2), then increase to level 3 after 2 weeks  We will consider a repeat inspire titration study at his next visit, if his usage remains consistent  We may consider any systems check at a future appointment, although as noted in previous visits, he very rarely, if ever, experiences difficulties with discomfort from the device at tested amplitudes when in the office  Did review the importance of remembering to utilize the device, including setting up reminders         S/P insertion of hypoglossal nerve stimulator      Inspire Settings  Outgoing Settings  Electrode configuration A + - +   Amplitude (V) 2.5  Range (V) 2.4 to 3.4  Pulse width (us)/ Rate (Hz)  90/33  Start delay (min) 45  Pause Time (min) 30  Therapy Duration (hr) 8               ________________________________________________________________________________________________     Per Last Visit Note (Date: 2024):  Jose is a pleasant  "68-year-old gentleman with a PMHx of CAD status post STEMI status post PTCA, HTN, cervical radiculopathy, macular degeneration, HLD, RLS, PLMD who presents in follow up for MILDRED (baseline AHI 23.7 in 2021) status post inspire implantation, activation 10/2021.  He is doing slightly better with regards to the uncomfortable \"zapping\" type sensation in his right lower jaw; namely, it sounds as though if he utilizes the pause function when this happens, he typically will fall back asleep and sleep through the night.  His largest difficulty right now is moreso with remembering to turn the device on when he goes to bed.     Will have him continue the \"pause\" strategy to help deal with this subjective \"zapping\" sensation; of note, he did not endorse any of this sensation during stimulus testing today.  Discussed strategies to help him remember to utilize the device, including placing a note by his light reminding him to do so as well as setting an alarm for the same time every night.  Given some ongoing delay in falling asleep, we will increase his start delay to 45  For now, we will have him keep the same level for the next week or so, then next week on Thursday, increase the setting by 1 level for ~2 weeks, before trying to go up 1 more level.  Would like to undergo a retitration study, however this will be dependent on his status at his next appointment.  Reviewed the importance of treating MILDRED, and remembering to utilize his inspire therapy regularly.    At this time, he denies substantial symptoms of his restless leg syndrome; will continue to monitor this closely and determine if medication changes are needed in the future.  He will Return in about 5 weeks (around 12/19/2024).        Inspire Settings:  Outgoing Settings  Electrode configuration A + - +   Amplitude (V) 2.6  Range (V) 2.4 to 3.4  Pulse width (us)/ Rate (Hz)  90/33  Start delay (min) 45  Pause Time (min) 30  Therapy Duration (hr) 8        Sleep " Studies:  -HSAT 1/12/2023: TRT: 63.8 minutes, FANNIE: 8.6, O2 pio: 89%      -HSAT 8/25/2022: TRT: 6 hours 57 minutes, FANNIE: 25.3, O2 pio: 86% 32 central/mixed apneas of 174 events.     -Inspire Titration Study 1/27/2022: Effective titration with AHI of 2 at settings of 3.2-3.4 V on electrode configuration 8.     -Note: Baseline AHI 30 on control night study.  Additional documentation stating an AHI of 23.7 in 2021.     ________________________________________________________________________________________________        Interval History: Jose Becker is a 69 y.o. male with a PMHx of CAD status post STEMI status post PTCA, HTN, cervical radiculopathy, macular degeneration, HLD, RLS, PLMD who presents in follow up for MILDRED (baseline AHI 23.7 in 2021) status post inspire implantation, activation 10/2021.     Inspire Hx  -Primary symptoms of concern the lead to inspire: Snoring, not a lot of symptoms before such as EDS.  -He has a history of moderate obstructive sleep apnea, AHI 23.7 diagnosed in 2021.      -His device was first activated in October 2021 at 0.8 V on configuration B, off minus off.    -At a follow-up visit with Dr. Darling, it seems that patient was changed to electrode configuration a at the range of 2 to 3 V.    -At a follow-up visit with Dr. Deshpande, the device was further titrated on configuration A.   -He had an inspire fine-tune sleep study in January 2022-that test showed an effective result with an AHI of 2 at the setting of 3.2 to 3.4 V on electrode configuration A..    -In a follow-up visit after the test, he was maintained on the current settings.  May 2022-notes describe some issues with tongue movement and discomfort, was at 2.8 V.    -Sleep test in May 2022 showed a respiratory event index of 25.3, there were 32 central/mixed apneas out of 174 events.    -Follow-up after that test, it was discussed that he had intolerance of higher voltage settings on configuration a and the patient was  "changed to configuration B at 1.3 V.  The patient had some symptoms of right-sided neck pain and back pain which she felt was attributable to the inspire device prior to this change.    -December 2022-due to discomfort, pulse width and rate was changed to 60/40 configuration B.    -A subsequent home sleep test in January 2023 showed a respiratory event index of 8.6, events much worse in the supine position, normal oxygenation.  Was at 1.3 volts?    -At his visit in February 2024, he was adjusted to 1.1 V with a pulse width/rate combination of 120/40.    -At his visit in March 2024, describe waking up with discomfort from the device, was changed to 120/33, 2.2 V.    -At a follow-up in April 2024, he had concern the device was not functioning properly.  Waveforms and impedance were checked and were normal.  -At his visit in June 2024, he expressed concern that the device was \"turning off in the middle of the night\" and not working properly.  Was not using it consistently as a result of this, stating that he felt the tongue stimulation when he first started the night but awakens in the middle of the night and did not feel it.  Reassurance was provided and he was encouraged to use it more consistently, with plans for a repeat HSAT at his subsequent appointment to verify efficacy.  -8/2024 -reverted back to Default configuration (A, plus minus plus) and pulse width/rate (90/33), started amplitude of 2.6.  Plan for retitration study in ~2 months.  -9/2024: Pause time increased, encouraged to try utilizing the pause function overnight in order to deal with the uncomfortable sensation in his chin waking him up and causing him to turn the device off  -11/2024: Continue to utilize positive strategy, however at this visit he was having difficulty remembering to turn it on when he initially went to bed.  Discussed strategies to overcome this, start delay increased to 45 minutes.           At today's visit:  At one point, he " stated that when he uses it, he gets a good response. However, later on, he stated that he still feels tired when he wakes in the morning, even when he is using it.    Does endorse that he cannot feel it when it is on, which he is happy about from a comfort level, but there are times when he will wake up and try to take a drink of water or speak with his wife and he'll realize he cannot control his tongue as well, so then he goes and turns it off.     Usage actually looks better today; no overt pauses notable, contrary to his statements.   -Using average 7.4 hours nightly.   -Average pauses are 0.3 times nightly.     -Note - usage does look better at 2.5V.    Ultimately states that the device and stimulus maybe 2x/month. Has decreased the stimulus down to 2.5V.     Will be starting cancer treatments next month.        Tested at pulse width 120/33, and he still stated that the 90/33 at 2.5V felt more comfortable.      Inspire Settings  Incoming Settings  Electrode configuration A + - +   Amplitude (V) 2.6  Range (V) 2.4 to 3.4  Pulse width (us)/ Rate (Hz)  90/33  Start delay (min) 45  Pause Time (min) 30  Therapy Duration (hr) 8         Iron panel 8/26/2024:  Ferritin: 183  Iron saturation: 44%  TIBC: 286  Iron: 127           Sleep Schedule:  Bedtime: 2100.   Asleep: 15-20 minutes  Wake: 0600        Sitting and reading: (Patient-Rptd) (P) Slight chance of dozing  Watching TV: (Patient-Rptd) (P) Slight chance of dozing  Sitting, inactive in a public place (e.g. a theatre or a meeting): (Patient-Rptd) (P) Would never doze  As a passenger in a car for an hour without a break: (Patient-Rptd) (P) Would never doze  Lying down to rest in the afternoon when circumstances permit: (Patient-Rptd) (P) Slight chance of dozing  Sitting and talking to someone: (Patient-Rptd) (P) Would never doze  Sitting quietly after a lunch without alcohol: (Patient-Rptd) (P) Would never doze  In a car, while stopped for a few minutes in traffic:  "(Patient-Rptd) (P) Would never doze  Total score: (Patient-Rptd) (P) 3     Review of Systems  Pertinent positives/negatives included in HPI and also as noted:       Objective   /65 (BP Location: Left arm, Patient Position: Sitting, Cuff Size: Large)   Pulse 67   Temp 98.2 °F (36.8 °C) (Temporal)   Resp 16   Ht 5' 11\" (1.803 m)   Wt 96.9 kg (213 lb 9.6 oz)   SpO2 97%   BMI 29.79 kg/m²     Neck Circumference: 16.5  Physical Exam  PHYSICAL EXAMINATION:  Vital Signs: /65 (BP Location: Left arm, Patient Position: Sitting, Cuff Size: Large)   Pulse 67   Temp 98.2 °F (36.8 °C) (Temporal)   Resp 16   Ht 5' 11\" (1.803 m)   Wt 96.9 kg (213 lb 9.6 oz)   SpO2 97%   BMI 29.79 kg/m²     Constitutional: NAD, well appearing   Mental Status: AAOx3  Skin: Warm, dry, no rashes noted   Eyes: PERRL, normal conjunctiva  Chest: No evidence of respiratory distress, no accessory muscle use; no evidence of peripheral cyanosis  Abdomen: Soft, NT/ND  Extremities: No digital clubbing or pedal edema  Neuro: Strength 5/5 throughout, sensation grossly intact    Data  Lab Results   Component Value Date    HGB 15.6 11/27/2024    HCT 47.1 11/27/2024    MCV 90 11/27/2024      Lab Results   Component Value Date    CALCIUM 8.8 01/06/2025    K 4.1 01/06/2025    CO2 27 01/06/2025     01/06/2025    BUN 13 01/06/2025    CREATININE 0.66 01/06/2025     Lab Results   Component Value Date    IRON 127 08/26/2024    TIBC 286 08/26/2024    FERRITIN 183 08/26/2024     Lab Results   Component Value Date    AST 13 01/06/2025    ALT 10 01/06/2025       I have spent a total time of 45-50 minutes in caring for this patient on the day of the visit/encounter including Diagnostic results, Prognosis, Risks and benefits of tx options, Instructions for management, Patient and family education, Importance of tx compliance, Risk factor reductions, Documenting in the medical record, Reviewing/placing orders in the medical record (including tests, " medications, and/or procedures), Obtaining or reviewing history  , and Communicating with other healthcare professionals .      Electronically signed by:    Bucky Bob DO  Board-Certified Neurology and Sleep Medicine  [facility]

## 2025-04-01 ENCOUNTER — DOCUMENTATION (OUTPATIENT)
Dept: HEMATOLOGY ONCOLOGY | Facility: CLINIC | Age: 70
End: 2025-04-01

## 2025-04-01 NOTE — PROGRESS NOTES
Patient is scheduled for Radiation SIM on 4/25/25. I will make sure patient follows back up with urology 3-6 months after completion of radiation with PSA prior to the visit.

## 2025-04-02 ENCOUNTER — APPOINTMENT (OUTPATIENT)
Dept: LAB | Facility: HOSPITAL | Age: 70
End: 2025-04-02
Payer: MEDICARE

## 2025-04-02 DIAGNOSIS — Z01.812 PRE-OPERATIVE LABORATORY EXAMINATION: ICD-10-CM

## 2025-04-02 DIAGNOSIS — C61 PROSTATE CANCER (HCC): ICD-10-CM

## 2025-04-02 DIAGNOSIS — R97.20 ELEVATED PSA: ICD-10-CM

## 2025-04-02 DIAGNOSIS — R39.89 SUSPECTED UTI: ICD-10-CM

## 2025-04-02 LAB
ALBUMIN SERPL BCG-MCNC: 4.1 G/DL (ref 3.5–5)
ALP SERPL-CCNC: 84 U/L (ref 34–104)
ALT SERPL W P-5'-P-CCNC: 9 U/L (ref 7–52)
ANION GAP SERPL CALCULATED.3IONS-SCNC: 4 MMOL/L (ref 4–13)
AST SERPL W P-5'-P-CCNC: 13 U/L (ref 13–39)
BASOPHILS # BLD AUTO: 0.06 THOUSANDS/ÂΜL (ref 0–0.1)
BASOPHILS NFR BLD AUTO: 1 % (ref 0–1)
BILIRUB SERPL-MCNC: 0.43 MG/DL (ref 0.2–1)
BUN SERPL-MCNC: 13 MG/DL (ref 5–25)
CALCIUM SERPL-MCNC: 8.9 MG/DL (ref 8.4–10.2)
CHLORIDE SERPL-SCNC: 104 MMOL/L (ref 96–108)
CO2 SERPL-SCNC: 30 MMOL/L (ref 21–32)
CREAT SERPL-MCNC: 0.82 MG/DL (ref 0.6–1.3)
EOSINOPHIL # BLD AUTO: 0.25 THOUSAND/ÂΜL (ref 0–0.61)
EOSINOPHIL NFR BLD AUTO: 4 % (ref 0–6)
ERYTHROCYTE [DISTWIDTH] IN BLOOD BY AUTOMATED COUNT: 12.5 % (ref 11.6–15.1)
GFR SERPL CREATININE-BSD FRML MDRD: 89 ML/MIN/1.73SQ M
GLUCOSE P FAST SERPL-MCNC: 124 MG/DL (ref 65–99)
HCT VFR BLD AUTO: 47.5 % (ref 36.5–49.3)
HGB BLD-MCNC: 15.7 G/DL (ref 12–17)
IMM GRANULOCYTES # BLD AUTO: 0.01 THOUSAND/UL (ref 0–0.2)
IMM GRANULOCYTES NFR BLD AUTO: 0 % (ref 0–2)
LYMPHOCYTES # BLD AUTO: 1.68 THOUSANDS/ÂΜL (ref 0.6–4.47)
LYMPHOCYTES NFR BLD AUTO: 29 % (ref 14–44)
MCH RBC QN AUTO: 29.8 PG (ref 26.8–34.3)
MCHC RBC AUTO-ENTMCNC: 33.1 G/DL (ref 31.4–37.4)
MCV RBC AUTO: 90 FL (ref 82–98)
MONOCYTES # BLD AUTO: 0.57 THOUSAND/ÂΜL (ref 0.17–1.22)
MONOCYTES NFR BLD AUTO: 10 % (ref 4–12)
NEUTROPHILS # BLD AUTO: 3.16 THOUSANDS/ÂΜL (ref 1.85–7.62)
NEUTS SEG NFR BLD AUTO: 56 % (ref 43–75)
NRBC BLD AUTO-RTO: 0 /100 WBCS
PLATELET # BLD AUTO: 175 THOUSANDS/UL (ref 149–390)
PMV BLD AUTO: 9.9 FL (ref 8.9–12.7)
POTASSIUM SERPL-SCNC: 4.3 MMOL/L (ref 3.5–5.3)
PROT SERPL-MCNC: 6.9 G/DL (ref 6.4–8.4)
PSA SERPL-MCNC: 2.51 NG/ML (ref 0–4)
RBC # BLD AUTO: 5.26 MILLION/UL (ref 3.88–5.62)
SODIUM SERPL-SCNC: 138 MMOL/L (ref 135–147)
WBC # BLD AUTO: 5.73 THOUSAND/UL (ref 4.31–10.16)

## 2025-04-02 PROCEDURE — 36415 COLL VENOUS BLD VENIPUNCTURE: CPT

## 2025-04-02 PROCEDURE — 87086 URINE CULTURE/COLONY COUNT: CPT

## 2025-04-02 PROCEDURE — 85025 COMPLETE CBC W/AUTO DIFF WBC: CPT

## 2025-04-02 PROCEDURE — 80053 COMPREHEN METABOLIC PANEL: CPT

## 2025-04-02 PROCEDURE — 84153 ASSAY OF PSA TOTAL: CPT

## 2025-04-03 LAB — BACTERIA UR CULT: NORMAL

## 2025-04-05 ENCOUNTER — RESULTS FOLLOW-UP (OUTPATIENT)
Dept: RADIATION ONCOLOGY | Facility: CLINIC | Age: 70
End: 2025-04-05

## 2025-04-08 ENCOUNTER — ANESTHESIA EVENT (OUTPATIENT)
Dept: PERIOP | Facility: HOSPITAL | Age: 70
End: 2025-04-08
Payer: MEDICARE

## 2025-04-08 ENCOUNTER — PATIENT OUTREACH (OUTPATIENT)
Dept: HEMATOLOGY ONCOLOGY | Facility: CLINIC | Age: 70
End: 2025-04-08

## 2025-04-08 ENCOUNTER — TELEPHONE (OUTPATIENT)
Dept: RADIATION ONCOLOGY | Facility: CLINIC | Age: 70
End: 2025-04-08

## 2025-04-08 RX ORDER — ALIROCUMAB 75 MG/ML
INJECTION, SOLUTION SUBCUTANEOUS
COMMUNITY
Start: 2025-04-04

## 2025-04-08 NOTE — TELEPHONE ENCOUNTER
Discussed patient's question about PSA level with Dr. Lombardi. Called patient and explained the fluctuation of PSA levels. He verbalized understanding.

## 2025-04-08 NOTE — PROGRESS NOTES
Received messed from [facility] that patient called them very confused. Patient was to receive call from PAT at 10:30. I called patient to explain that they may be running behind on calls but no answer and unable to LM.

## 2025-04-08 NOTE — PRE-PROCEDURE INSTRUCTIONS
Pre-Surgery Instructions:   Medication Instructions    clopidogrel (PLAVIX) 75 mg tablet Instructions provided by [facility] cardio may hold 5 days prior to surgery -last dose 4/10/25    gabapentin (NEURONTIN) 300 mg capsule Take day of surgery.    levothyroxine 100 mcg tablet Take night before surgery    metoprolol succinate (TOPROL-XL) 25 mg 24 hr tablet Take night before surgery    montelukast (SINGULAIR) 10 mg tablet Take night before surgery    Omega-3 Fatty Acids (OMEGA-3 FISH OIL PO) Stop taking 7 days prior to surgery.    omeprazole (PriLOSEC) 20 mg delayed release capsule Take day of surgery.    Praluent 75 MG/ML SOAJ            ASA 81mg tablet                  Preservision AREDS Vitamin Takes every 14 day---next dose due 4/13/25        Instructions provided per MD-per cardio no hold before surgery---if pt takes with food-do not take DOS    Stop taking 7 days prior to surgery        Medication instructions for day of surgery reviewed. Please take all instructed medications with only a sip of water.       You will receive a call one business day prior to surgery with an arrival time and hospital directions. If your surgery is scheduled on a Monday, the hospital will be calling you on the Friday prior to your surgery. If you have not heard from anyone by 8pm, please call the hospital supervisor through the hospital  at [phone]. ([place] [phone] or [place] [phone]).    Do not eat or drink anything after midnight the night before your surgery, including candy, mints, lifesavers, or chewing gum. Do not drink alcohol 24hrs before your surgery. Try not to smoke at least 24hrs before your surgery.       Follow the pre surgery showering instructions as listed in the “My Surgical Experience Booklet” or otherwise provided by your surgeon's office. Do not use a blade to shave the surgical area 1 week before surgery. It is okay to use a clean electric clippers up to 24 hours before surgery. Do not  apply any lotions, creams, including makeup, cologne, deodorant, or perfumes after showering on the day of your surgery. Do not use dry shampoo, hair spray, hair gel, or any type of hair products.     No contact lenses, eye make-up, or artificial eyelashes. Remove nail polish, including gel polish, and any artificial, gel, or acrylic nails if possible. Remove all jewelry including rings and body piercing jewelry.     Wear causal clothing that is easy to take on and off. Consider your type of surgery.    Keep any valuables, jewelry, piercings at home. Please bring any specially ordered equipment (sling, braces) if indicated.    Arrange for a responsible person to drive you to and from the hospital on the day of your surgery. Please confirm the visitor policy for the day of your procedure when you receive your phone call with an arrival time.     Call the surgeon's office with any new illnesses, exposures, or additional questions prior to surgery.    Please reference your “My Surgical Experience Booklet” for additional information to prepare for your upcoming surgery.

## 2025-04-16 ENCOUNTER — HOSPITAL ENCOUNTER (OUTPATIENT)
Facility: HOSPITAL | Age: 70
Setting detail: OUTPATIENT SURGERY
Discharge: HOME | End: 2025-04-16
Attending: UROLOGY | Admitting: UROLOGY
Payer: MEDICARE

## 2025-04-16 ENCOUNTER — ANESTHESIA (OUTPATIENT)
Dept: PERIOP | Facility: HOSPITAL | Age: 70
End: 2025-04-16
Payer: MEDICARE

## 2025-04-16 VITALS
BODY MASS INDEX: 28.43 KG/M2 | HEART RATE: 65 BPM | RESPIRATION RATE: 16 BRPM | DIASTOLIC BLOOD PRESSURE: 59 MMHG | SYSTOLIC BLOOD PRESSURE: 106 MMHG | HEIGHT: 71 IN | OXYGEN SATURATION: 95 % | WEIGHT: 203.04 LBS | TEMPERATURE: 97.3 F

## 2025-04-16 DIAGNOSIS — C61 PROSTATE CANCER (HCC): Primary | ICD-10-CM

## 2025-04-16 PROCEDURE — 55876 PLACE RT DEVICE/MARKER PROS: CPT | Performed by: UROLOGY

## 2025-04-16 PROCEDURE — A4648 IMPLANTABLE TISSUE MARKER: HCPCS | Performed by: UROLOGY

## 2025-04-16 PROCEDURE — 55874 TPRNL PLMT BIODEGRDABL MATRL: CPT | Performed by: UROLOGY

## 2025-04-16 PROCEDURE — C1889 IMPLANT/INSERT DEVICE, NOC: HCPCS | Performed by: UROLOGY

## 2025-04-16 PROCEDURE — NC001 PR NO CHARGE: Performed by: UROLOGY

## 2025-04-16 PROCEDURE — 51798 US URINE CAPACITY MEASURE: CPT | Performed by: UROLOGY

## 2025-04-16 DEVICE — STERILE PLACEMENT NEEDLES (17GA ETW X 20CM) WITH BONE WAX AND (1.2 X 3MM) SOFT TISSUE GOLD MARKER [3]
Type: IMPLANTABLE DEVICE | Site: PENIS | Status: FUNCTIONAL
Brand: FIDUCIAL MARKER KIT

## 2025-04-16 DEVICE — SPACEOAR SYSTEMS
Type: IMPLANTABLE DEVICE | Site: PENIS | Status: FUNCTIONAL
Brand: SPACEOAR VUE™ SYSTEM - 10ML

## 2025-04-16 RX ORDER — FENTANYL CITRATE/PF 50 MCG/ML
25 SYRINGE (ML) INJECTION
Status: DISCONTINUED | OUTPATIENT
Start: 2025-04-16 | End: 2025-04-16 | Stop reason: HOSPADM

## 2025-04-16 RX ORDER — ACETAMINOPHEN 325 MG/1
975 TABLET ORAL ONCE
Status: COMPLETED | OUTPATIENT
Start: 2025-04-16 | End: 2025-04-16

## 2025-04-16 RX ORDER — LIDOCAINE HYDROCHLORIDE 20 MG/ML
INJECTION, SOLUTION EPIDURAL; INFILTRATION; INTRACAUDAL; PERINEURAL AS NEEDED
Status: DISCONTINUED | OUTPATIENT
Start: 2025-04-16 | End: 2025-04-16 | Stop reason: HOSPADM

## 2025-04-16 RX ORDER — FENTANYL CITRATE 50 UG/ML
INJECTION, SOLUTION INTRAMUSCULAR; INTRAVENOUS AS NEEDED
Status: DISCONTINUED | OUTPATIENT
Start: 2025-04-16 | End: 2025-04-16

## 2025-04-16 RX ORDER — PROPOFOL 10 MG/ML
INJECTION, EMULSION INTRAVENOUS AS NEEDED
Status: DISCONTINUED | OUTPATIENT
Start: 2025-04-16 | End: 2025-04-16

## 2025-04-16 RX ORDER — CIPROFLOXACIN 2 MG/ML
400 INJECTION, SOLUTION INTRAVENOUS
Status: COMPLETED | OUTPATIENT
Start: 2025-04-16 | End: 2025-04-16

## 2025-04-16 RX ORDER — SODIUM CHLORIDE, SODIUM LACTATE, POTASSIUM CHLORIDE, CALCIUM CHLORIDE 600; 310; 30; 20 MG/100ML; MG/100ML; MG/100ML; MG/100ML
125 INJECTION, SOLUTION INTRAVENOUS CONTINUOUS
Status: DISCONTINUED | OUTPATIENT
Start: 2025-04-16 | End: 2025-04-16 | Stop reason: HOSPADM

## 2025-04-16 RX ORDER — ONDANSETRON 2 MG/ML
4 INJECTION INTRAMUSCULAR; INTRAVENOUS ONCE AS NEEDED
Status: DISCONTINUED | OUTPATIENT
Start: 2025-04-16 | End: 2025-04-16 | Stop reason: HOSPADM

## 2025-04-16 RX ORDER — PROPOFOL 10 MG/ML
INJECTION, EMULSION INTRAVENOUS CONTINUOUS PRN
Status: DISCONTINUED | OUTPATIENT
Start: 2025-04-16 | End: 2025-04-16

## 2025-04-16 RX ADMIN — DEXMEDETOMIDINE HYDROCHLORIDE 12 MCG: 100 INJECTION, SOLUTION INTRAVENOUS at 15:41

## 2025-04-16 RX ADMIN — FENTANYL CITRATE 25 MCG: 50 INJECTION INTRAMUSCULAR; INTRAVENOUS at 15:44

## 2025-04-16 RX ADMIN — SODIUM CHLORIDE, SODIUM LACTATE, POTASSIUM CHLORIDE, AND CALCIUM CHLORIDE 125 ML/HR: .6; .31; .03; .02 INJECTION, SOLUTION INTRAVENOUS at 13:23

## 2025-04-16 RX ADMIN — PROPOFOL 120 MCG/KG/MIN: 10 INJECTION, EMULSION INTRAVENOUS at 15:44

## 2025-04-16 RX ADMIN — FENTANYL CITRATE 25 MCG: 50 INJECTION INTRAMUSCULAR; INTRAVENOUS at 15:58

## 2025-04-16 RX ADMIN — PROPOFOL 30 MG: 10 INJECTION, EMULSION INTRAVENOUS at 15:47

## 2025-04-16 RX ADMIN — CIPROFLOXACIN: 400 INJECTION, SOLUTION INTRAVENOUS at 15:44

## 2025-04-16 RX ADMIN — FENTANYL CITRATE 50 MCG: 50 INJECTION INTRAMUSCULAR; INTRAVENOUS at 15:41

## 2025-04-16 RX ADMIN — ACETAMINOPHEN 975 MG: 325 TABLET, FILM COATED ORAL at 13:15

## 2025-04-16 NOTE — ANESTHESIA PREPROCEDURE EVALUATION
Procedure:  INSERTION OF FIDUCIAL MARKER (TRANSRECTAL ULTRASOUND GUIDANCE), SPACEOAR (Penis)    Relevant Problems   ANESTHESIA (within normal limits)      CARDIO   (+) CAD (coronary artery disease), native coronary artery   (+) History of coronary artery bypass graft   (+) Hypertensive disorder   (+) Mixed hyperlipidemia   (+) STEMI (ST elevation myocardial infarction) (HCC)      /RENAL   (+) Prostate cancer (HCC)      MUSCULOSKELETAL   (+) Chronic back pain   (+) Chronic low back pain      NEURO/PSYCH   (+) Chronic back pain   (+) Chronic low back pain      PULMONARY   (+) Moderate persistent asthma without complication   (+) MILDRED (obstructive sleep apnea)   2022 Echo:    Technically challenging study.     Left ventricle is normal in size. Wall thickness is normal. Systolic function is normal with an ejection fraction of 60-65%. Wall motion is within normal limits. There is grade I (mild) diastolic dysfunction.     Right ventricle cavity is mildly dilated. Systolic function is normal.     No significant valve abnormalities.     Pulmonic Valve: Normal pulmonary artery pressure.      Post-inspire sleep study:  1) This study demonstrates increased number of respiratory events with FANNIE of 8.6 events per hour consistent with mild MILDRED although his FANNIE has improved compared to his pre-INSPIRE FANNIE of 23.7 events per hour. FANNIE was worse in supine position with supine FANNIE of 27.5 events per hour.   2) Normal baseline oxygen saturation with mild respiratory event related hypoxemia and lowest SpO2 of 88%.   3) Average pulse during the study was 70.6 beats per minute.        Physical Exam    Airway    Mallampati score: II  TM Distance: <3 FB       Dental    upper dentures    Cardiovascular  Rhythm: regular, Rate: normal    Pulmonary   Breath sounds clear to auscultation    Other Findings        Anesthesia Plan  ASA Score- 3     Anesthesia Type- IV sedation with anesthesia with ASA Monitors.         Additional Monitors:      Airway Plan:     Comment: Inspire device in place  .       Plan Factors-Exercise tolerance (METS): >4 METS.    Chart reviewed.   Existing labs reviewed. Patient summary reviewed.    Patient is not a current smoker.      Obstructive sleep apnea risk education given perioperatively.        Induction- intravenous.    Postoperative Plan-         Informed Consent- Anesthetic plan and risks discussed with patient.

## 2025-04-16 NOTE — H&P
"UROLOGY H&P NOTE     Patient Identifiers: Jose Becker (MRN [id number])    Date of Service: 4/16/2025    History of Present Illness:     Jose Becker is a 69 y.o. old with a history of prostate cancer    Past Medical, Past Surgical History:     Past Medical History:   Diagnosis Date    Allergic rhinitis     Anesthesia     \"one time took longer to wake up\"    Arthritis     Asthma     Coronary artery disease     LVH Dr Bautista    COVID-19 12/2020    Disease of thyroid gland     Heart attack (HCC)     History of pneumonia     Hyperlipidemia     Hypertension     Hypertensive disorder 07/15/2013    Myocardial infarction (HCC)     2012 / 2018    Rupture, spleen     \"as a child fell on tree stump\"    S/P insertion of hypoglossal nerve stimulator 06/27/2024    Sleep apnea     has Inspire implanted    Tinnitus     Wears glasses     readers    Wears partial dentures     upper   :    Past Surgical History:   Procedure Laterality Date    COLONOSCOPY      CORONARY ARTERY BYPASS GRAFT  2012    2 stents..2018..stent / 2012 3 vessel bypass    EXAMINATION UNDER ANESTHESIA N/A 09/24/2020    Procedure: DRUG INDUCED SLEEP ENDOSCOPY;  Surgeon: Nasir Darling MD;  Location: [facility];  Service: ENT    JOINT REPLACEMENT      VT OPEN IMPLANTATION CRANIAL NERVE SCOOBY & PULSE GEN N/A 09/08/2021    Procedure: INSERTION UPPER AIRWAY STIMULATOR, INSPIRE IMPLANT;  Surgeon: Nasir Darling MD;  Location: [facility];  Service: ENT    VT PROSTATE NEEDLE BIOPSY ANY APPROACH N/A 12/11/2024    Procedure: TRANSPERINEAL MRI FUSION BX PROSTATE;  Surgeon: Salas Duvall MD;  Location: [facility];  Service: Urology    REPLACEMENT TOTAL KNEE BILATERAL      ROTATOR CUFF REPAIR     :    Medications, Allergies:     Current Facility-Administered Medications:     ciprofloxacin (CIPRO) IVPB (premix in 5% dextrose) 400 mg 200 mL, On Call To OR    lactated ringers infusion, Continuous, Last Rate: 125 mL/hr (04/16/25 1323)    Allergies:  Allergies   Allergen " "Reactions    Clarithromycin Rash    Other Itching and Rash     And redness around nose and mouth with CPAP Mask's--now has Inspire    Penicillins Hives and Rash    Sulfa Antibiotics Hives   :    Social and Family History:   Social History:   Social History     Tobacco Use    Smoking status: Former     Current packs/day: 0.00     Average packs/day: 0.5 packs/day for 6.0 years (3.0 ttl pk-yrs)     Types: Cigarettes     Start date:      Quit date: 2009     Years since quittin.2    Smokeless tobacco: Former     Types: Chew   Vaping Use    Vaping status: Never Used   Substance Use Topics    Alcohol use: Yes     Comment: \"not often\"    Drug use: Yes     Types: Marijuana     Comment: medical card for arthritis pain last used 4.15.25   .    Social History     Tobacco Use   Smoking Status Former    Current packs/day: 0.00    Average packs/day: 0.5 packs/day for 6.0 years (3.0 ttl pk-yrs)    Types: Cigarettes    Start date:     Quit date: 2009    Years since quittin.2   Smokeless Tobacco Former    Types: Chew       Family History:  Family History   Problem Relation Age of Onset    Cancer Brother     Melanoma Brother    :     Review of Systems:     General: Fever, chills, or night sweats: negative  Cardiac: Negative for chest pain.    Pulmonary: Negative for shortness of breath.  Gastrointestinal: Abdominal pain negative.  Nausea, vomiting, or diarrhea negative,  Genitourinary: See HPI above.  Patient does not have hematuria.  All other systems queried were negative.    Physical Exam:   General: Patient is pleasant and in NAD. Awake and alert  /64   Pulse 62   Temp (!) 97.4 °F (36.3 °C) (Temporal)   Resp 16   Ht 5' 11\" (1.803 m)   Wt 92.1 kg (203 lb 0.7 oz)   SpO2 97%   BMI 28.32 kg/m² Temp (24hrs), Av.4 °F (36.3 °C), Min:97.4 °F (36.3 °C), Max:97.4 °F (36.3 °C)  current; Temperature: (!) 97.4 °F (36.3 °C)  I/O last 24 hours:  In: 100 [I.V.:100]  Out: -   Cardiac: Peripheral edema: " negative  Pulmonary: Non-labored breathing  Abdomen: Soft, non-tender, non-distended.  No surgical scars.  No masses, tenderness, hernias noted.    Genitourinary: Negative CVA tenderness, negative suprapubic tenderness.        Labs:     Lab Results   Component Value Date    HGB 15.7 04/02/2025    HCT 47.5 04/02/2025    WBC 5.73 04/02/2025     04/02/2025   ]    Lab Results   Component Value Date    K 4.3 04/02/2025     04/02/2025    CO2 30 04/02/2025    BUN 13 04/02/2025    CREATININE 0.82 04/02/2025    CALCIUM 8.9 04/02/2025   ]    Imaging:     Any pertinent imaging was personally reviewed and discussed with patient. See below for details.    ASSESSMENT:     69 y.o. old male with prostate cancer    Anticoagulation: Aspirin 81 mg    Prostate MRI 9/17/2024: PI-RADS 4 left apical medial peripheral zone lesion; no EPE/SVI/LAD/bony metastasis; 23 g prostate    Diagnosed with Cat 3+4 prostate cancer from biopsy on 12/11/2024    PSA 2.514 on 4/2/2025    Patient arranged for XRT.  Radiation oncology recommended space oar and fiduciary marker placement.      We discussed the procedure of SpaceOAR hydrogel placement and prostate fiducial marker placement.    We went over the details of the procedure.  I explained that during the procedure, the patient is asleep and a transrectal ultrasound probe is entered to visualize the prostate and surrounding tissues.      I explained that under ultrasound guidance, we are able to place the gold fiducial markers into the prostate.  I explained that typically, 3 markers are placed in different areas of the prostate.  I explained that the markers help the radiation team localized their treatment.  Patient understands that the fiducial markers are permanent.    I explained that the SpaceOAR hydrogel is a substance that is entered into the body to add extra cushioning between the prostate and the rectum.  I explained that this is useful in the setting of prostate cancer  radiation to space the rectum out a bit further from the prostate so that there is less risk of unwanted radiation to the rectum.  I explained that the substance usually dissolves after about 6 months.    We discussed the risks of the procedure.  We went over the risks including but not limited to: General Anesthesia risks, bleeding, infection, damage to nearby structures, lack of efficacy.    We talked about the possibility of having blood in the urine.  I explained that the urethra runs through the prostate and there are times that the needle goes through the urethra, causing blood in the urine.  I explained that as long as the urine is fruit punch or lighter and does not have large clots, and as long as the patient is able to urinate, they usually does not need to be any sort of intervention regarding the blood in the urine.  I explained that in trouble some cases, patients may need Evans catheter placement.  We also talked about the low risk of needing additional procedures to address the bleeding.    We discussed the risk of the gold seeds being placed outside of the prostate.    We also discussed the risk of the SpaceOAR hydrogel not being ideally placed.  I explained that generally, we want the hydrogel to be placed as close to the midline as possible to allow for ideal separation between the prostate and the rectum.  However, I explained that sometimes the hydrogel is placed asymmetrically more on 1 side versus the other which causes suboptimal separation of the rectum from the prostate.  We also went over the risk of the hydrogel being placed into the rectum causing rectal injury.    Patient agreed to proceed.  Consent was obtained.      PLAN:     Preop Cipro  OR plan as above  Discharge home from PACU

## 2025-04-16 NOTE — ANESTHESIA POSTPROCEDURE EVALUATION
Post-Op Assessment Note    CV Status:  Stable    Pain management: adequate       Mental Status:  Alert and awake   Hydration Status:  Euvolemic   PONV Controlled:  Controlled   Airway Patency:  Patent     Post Op Vitals Reviewed: Yes    No anethesia notable event occurred.    Staff: CRNA         Last Filed PACU Vitals:  Vitals Value Taken Time   Temp     Pulse     BP     Resp     SpO2

## 2025-04-16 NOTE — OP NOTE
OPERATIVE REPORT  PATIENT NAME: Jose Becker    :  1955  MRN: [id number]  Pt Location: [facility] 03    SURGERY DATE: 2025    Surgeons and Role:     * Sebastien Sánchez,  - Primary     * Nasir Hill MD - Assisting    Preop Diagnosis:  Prostate cancer (HCC) [C61]    Post-Op Diagnosis Codes:     * Prostate cancer (HCC) [C61]    Procedure(s):  INSERTION OF FIDUCIAL MARKER (TRANSRECTAL ULTRASOUND GUIDANCE). SPACEOAR    Specimen(s):  * No specimens in log *    Estimated Blood Loss:   Minimal    Drains:  * No LDAs found *    Anesthesia Type:   IV Sedation with Anesthesia    Operative Indications:  Prostate cancer (HCC) [C61]      69 y.o. old male with prostate cancer     Anticoagulation: Aspirin 81 mg     Prostate MRI 2024: PI-RADS 4 left apical medial peripheral zone lesion; no EPE/SVI/LAD/bony metastasis; 23 g prostate     Diagnosed with Cat 3+4 prostate cancer from biopsy on 2024     PSA 2.514 on 2025     Patient arranged for XRT.  Radiation oncology recommended space oar and fiduciary marker placement.          Operative Findings:        Right mid gland, left base, left apex fiducial marker placement    Successful placement of SpaceOAR hydrogel        Complications:   None    Procedure and Technique:        Patient was identified in preop holding area. The patient has prostate cancer and has elected to undergo radiation therapy.  To shorten the duration of treatment, radiation oncology has requested fiducial marker placement and transperineal SpaceOar (R) Hydrogel placement transperineally.  I have explained to the patient the procedure, and the risks of bleeding, infection and pelvic pain due to the gel.  Also if I cannot develop a plane properly between the prostate and the rectum or if there is perforation of the rectum with the needle the procedure will have to be aborted.  He understood and gave informed consent.    The patient was brought to the operating room  "and identified properly.  He was placed supine on the table. IV sedation was induced, he was prepped and draped in the dorsal lithotomy position.  Care was taken to ensure that all pressure points were addressed.  IV Ancef was administered for preoperative antibiotics. He was prepped in sterile fashion.  A time-out was performed, and the biplanar transrectal ultrasound probe was placed into the rectum and sagittal and axial views were obtained of the prostate.      First 10 cc total of 1% lidocaine was used to provide local anesthesia to skin about 1 inch above anus at skin then deeper soft tissues toward the bilateral neurovascular bundles of the prostate. Ultrasound was used to guide needle placement. Then the fiducial markers were placed.  Three markers were placed with 3 individual needles and ultrasound guidance both sagittally and axially was used to place a gold marker in each of the following locations: Right mid gland, left apex, left base.      Once this was done, the Space Oar kit was opened and the individual syringes were prepared and placed into the device, taking care not to mix hard inner with the Hydrogel which could cause occlusion of the needle.  Once this was done, the needle itself was introduced in the midline of the perineum approximately 1 inch from the anus into the prostate under sagittal guidance-the needle was advanced above the rectal tent into the fat planes that lie between the prostate and the rectum and I advanced the needle to approximately CHCF along the length of the gland.  Sterile saline was injected for approximately 0.5 cc to make sure I was in the proper area and not in the prostate capsule nor in the wall the rectum.  This confirmed that I was indeed in the right area, and I performed hydrodissection with a couple cc of saline/lidocaine.  I then attached the 2 syringes with the Luer Lock (the \"Space Oar\") and reconfirmed that I was in the right area, then injected both " syringes simultaneously with the device containing the Hydrogel and the hardener into the space over an approximately 10 second time.  I watched it spread out posteriorly and anteriorly to the apex.  It also spread out laterally and covered space over the anterior rectum nicely.  Once I was satisfied with this, I withdrew the needle and held pressure against the perineum for 2 minutes. The patient tolerated the procedure well.    Patient was uneventfully awoken from anesthesia and taken to PACU in good condition.           A qualified resident physician was not available.    Patient Disposition:  PACU              SIGNATURE: Sebastien Sánchez,   DATE: April 16, 2025  TIME: 4:03 PM        Plan  - Already scheduled to see Dr. Ruiz on 6/30/2025 at 2:15 PM

## 2025-04-16 NOTE — DISCHARGE INSTR - AVS FIRST PAGE
You had prostate fiducial marker placement and Space Oar placement performed today. We placed 3 fiducial markers in your prostate. The SpaceOAR gel was placed in the area between your prostate and rectum as planned.    You have puncture sites in your perineum. These areas may have some slight oozing or bruising. You may put gauze over the site as needed.    Starting the day after surgery, you may shower. Do not take any baths/hot tubs/go in any standing body of water until your perineum puncture sites have totally healed (this can take about 1 week).    You may see blood in your urine, stool, or semen for the next few days. Call office if it is persistent.  Generally speaking, if your urine is fruit punch or lighter, that is okay.  You may also pass a few small blood clots in your urine, and this is generally okay.  Just stay well-hydrated.  If your urine is looking more like Merlot wine or if you are passing large blood clots and you are finding it difficult to urinate, you need to contact the urology office for further instruction.    Please call office or present to ED immediately if you experience fevers or chills.    You may take over the counter Tylenol as needed for pain.    Try avoid too much physical activity on the day of your procedure. Try to avoid long car rides or sitting down on a hard chair for at least a few days.     You are allowed to walk as usual on the day after surgery. Avoid heavy lifting for about 5 days after your procedure (as long as you are not experiencing any bleeding at that time). Avoid activities that put extra pressure on your perineum (bike riding, horse riding, etc.) for 2-3 weeks. Again, if you are still experiencing bleeding at this time, please do not partake in these activities.    You may resume your blood thinner, aspirin 81 mg, on 4/17/2025.    Please follow up as scheduled with your radiation oncology team as you begin your radiation therapy for your prostate  cancer.    Please keep your follow-up appoint with Dr. Ruiz on 6/30/2025 at 2:15 PM.

## 2025-04-17 NOTE — ANESTHESIA POSTPROCEDURE EVALUATION
Post-Op Assessment Note    CV Status:  Stable    Pain management: adequate       Mental Status:  Alert and awake   Hydration Status:  Euvolemic   PONV Controlled:  Controlled   Airway Patency:  Patent     Post Op Vitals Reviewed: Yes    No anethesia notable event occurred.    Staff: CRNA, Anesthesiologist           Last Filed PACU Vitals:  Vitals Value Taken Time   Temp     Pulse     BP     Resp     SpO2         Modified Valdo:     Vitals Value Taken Time   Activity 2 04/16/25 1625   Respiration 2 04/16/25 1625   Circulation 2 04/16/25 1625   Consciousness 1 04/16/25 1625   Oxygen Saturation 2 04/16/25 1625     Modified Valdo Score: 9

## 2025-04-21 PROCEDURE — 77263 THER RADIOLOGY TX PLNG CPLX: CPT | Performed by: RADIOLOGY

## 2025-04-25 ENCOUNTER — PATIENT OUTREACH (OUTPATIENT)
Dept: HEMATOLOGY ONCOLOGY | Facility: CLINIC | Age: 70
End: 2025-04-25

## 2025-04-25 ENCOUNTER — APPOINTMENT (OUTPATIENT)
Dept: RADIATION ONCOLOGY | Facility: CLINIC | Age: 70
End: 2025-04-25
Attending: RADIOLOGY
Payer: MEDICARE

## 2025-04-25 PROCEDURE — 77334 RADIATION TREATMENT AID(S): CPT | Performed by: RADIOLOGY

## 2025-04-25 NOTE — PROGRESS NOTES
Call to Jose to discuss transportation needs for RT appointments. Message left with my contact information requesting a return call

## 2025-04-30 ENCOUNTER — PATIENT OUTREACH (OUTPATIENT)
Dept: HEMATOLOGY ONCOLOGY | Facility: CLINIC | Age: 70
End: 2025-04-30

## 2025-04-30 NOTE — PROGRESS NOTES
TC to Jose to discuss his transportation needs. Completed [facility] documents together during call. Will scan into chart under  media and email to [facility]

## 2025-05-01 ENCOUNTER — APPOINTMENT (OUTPATIENT)
Dept: RADIATION ONCOLOGY | Facility: CLINIC | Age: 70
End: 2025-05-01
Payer: MEDICARE

## 2025-05-01 PROCEDURE — 77338 DESIGN MLC DEVICE FOR IMRT: CPT | Performed by: RADIOLOGY

## 2025-05-01 PROCEDURE — 77301 RADIOTHERAPY DOSE PLAN IMRT: CPT | Performed by: RADIOLOGY

## 2025-05-01 PROCEDURE — 77300 RADIATION THERAPY DOSE PLAN: CPT | Performed by: RADIOLOGY

## 2025-05-12 ENCOUNTER — TELEPHONE (OUTPATIENT)
Dept: RADIATION ONCOLOGY | Facility: CLINIC | Age: 70
End: 2025-05-12

## 2025-05-12 ENCOUNTER — APPOINTMENT (OUTPATIENT)
Dept: RADIATION ONCOLOGY | Facility: CLINIC | Age: 70
End: 2025-05-12
Attending: RADIOLOGY
Payer: MEDICARE

## 2025-05-12 LAB
RAD ONC ARIA COURSE FIRST TREATMENT DATE: NORMAL
RAD ONC ARIA COURSE ID: NORMAL
RAD ONC ARIA COURSE INTENT: NORMAL
RAD ONC ARIA COURSE LAST TREATMENT DATE: NORMAL
RAD ONC ARIA COURSE SESSION NUMBER: 1
RAD ONC ARIA COURSE TREATMENT ELAPSED DAYS: 0
RAD ONC ARIA PLAN FRACTIONS TREATED TO DATE: 1
RAD ONC ARIA PLAN ID: NORMAL
RAD ONC ARIA PLAN NAME: NORMAL
RAD ONC ARIA PLAN PRESCRIBED DOSE PER FRACTION: 2.5 GY
RAD ONC ARIA PLAN PRIMARY REFERENCE POINT: NORMAL
RAD ONC ARIA PLAN TOTAL FRACTIONS PRESCRIBED: 28
RAD ONC ARIA PLAN TOTAL PRESCRIBED DOSE: 7000 CGY
RAD ONC ARIA REFERENCE POINT DOSAGE GIVEN TO DATE: 2.5 GY
RAD ONC ARIA REFERENCE POINT ID: NORMAL
RAD ONC ARIA REFERENCE POINT SESSION DOSAGE GIVEN: 2.5 GY

## 2025-05-12 PROCEDURE — 77427 RADIATION TX MANAGEMENT X5: CPT | Performed by: RADIOLOGY

## 2025-05-12 PROCEDURE — 77014 CHG CT GUIDANCE RADIATION THERAPY FLDS PLACEMENT: CPT | Performed by: RADIOLOGY

## 2025-05-12 PROCEDURE — 77385 HB NTSTY MODUL RAD TX DLVR SMPL: CPT | Performed by: RADIOLOGY

## 2025-05-12 NOTE — TELEPHONE ENCOUNTER
Pt's wife called to confirm that [facility] had been established for apt. 5/13/25, this was confirmed to POD member who noted they would tell his wife.

## 2025-05-13 ENCOUNTER — APPOINTMENT (OUTPATIENT)
Dept: RADIATION ONCOLOGY | Facility: CLINIC | Age: 70
End: 2025-05-13
Attending: RADIOLOGY
Payer: MEDICARE

## 2025-05-13 LAB
RAD ONC ARIA COURSE FIRST TREATMENT DATE: NORMAL
RAD ONC ARIA COURSE ID: NORMAL
RAD ONC ARIA COURSE INTENT: NORMAL
RAD ONC ARIA COURSE LAST TREATMENT DATE: NORMAL
RAD ONC ARIA COURSE SESSION NUMBER: 2
RAD ONC ARIA COURSE TREATMENT ELAPSED DAYS: 1
RAD ONC ARIA PLAN FRACTIONS TREATED TO DATE: 2
RAD ONC ARIA PLAN ID: NORMAL
RAD ONC ARIA PLAN NAME: NORMAL
RAD ONC ARIA PLAN PRESCRIBED DOSE PER FRACTION: 2.5 GY
RAD ONC ARIA PLAN PRIMARY REFERENCE POINT: NORMAL
RAD ONC ARIA PLAN TOTAL FRACTIONS PRESCRIBED: 28
RAD ONC ARIA PLAN TOTAL PRESCRIBED DOSE: 7000 CGY
RAD ONC ARIA REFERENCE POINT DOSAGE GIVEN TO DATE: 5 GY
RAD ONC ARIA REFERENCE POINT ID: NORMAL
RAD ONC ARIA REFERENCE POINT SESSION DOSAGE GIVEN: 2.5 GY

## 2025-05-13 PROCEDURE — 77385 HB NTSTY MODUL RAD TX DLVR SMPL: CPT | Performed by: RADIOLOGY

## 2025-05-13 PROCEDURE — 77014 CHG CT GUIDANCE RADIATION THERAPY FLDS PLACEMENT: CPT | Performed by: RADIOLOGY

## 2025-05-14 ENCOUNTER — APPOINTMENT (OUTPATIENT)
Dept: RADIATION ONCOLOGY | Facility: CLINIC | Age: 70
End: 2025-05-14
Attending: RADIOLOGY
Payer: MEDICARE

## 2025-05-14 LAB
RAD ONC ARIA COURSE FIRST TREATMENT DATE: NORMAL
RAD ONC ARIA COURSE ID: NORMAL
RAD ONC ARIA COURSE INTENT: NORMAL
RAD ONC ARIA COURSE LAST TREATMENT DATE: NORMAL
RAD ONC ARIA COURSE SESSION NUMBER: 3
RAD ONC ARIA COURSE TREATMENT ELAPSED DAYS: 2
RAD ONC ARIA PLAN FRACTIONS TREATED TO DATE: 3
RAD ONC ARIA PLAN ID: NORMAL
RAD ONC ARIA PLAN NAME: NORMAL
RAD ONC ARIA PLAN PRESCRIBED DOSE PER FRACTION: 2.5 GY
RAD ONC ARIA PLAN PRIMARY REFERENCE POINT: NORMAL
RAD ONC ARIA PLAN TOTAL FRACTIONS PRESCRIBED: 28
RAD ONC ARIA PLAN TOTAL PRESCRIBED DOSE: 7000 CGY
RAD ONC ARIA REFERENCE POINT DOSAGE GIVEN TO DATE: 7.5 GY
RAD ONC ARIA REFERENCE POINT ID: NORMAL
RAD ONC ARIA REFERENCE POINT SESSION DOSAGE GIVEN: 2.5 GY

## 2025-05-14 PROCEDURE — 77385 HB NTSTY MODUL RAD TX DLVR SMPL: CPT | Performed by: INTERNAL MEDICINE

## 2025-05-14 PROCEDURE — 77014 CHG CT GUIDANCE RADIATION THERAPY FLDS PLACEMENT: CPT | Performed by: INTERNAL MEDICINE

## 2025-05-15 ENCOUNTER — APPOINTMENT (OUTPATIENT)
Dept: RADIATION ONCOLOGY | Facility: CLINIC | Age: 70
End: 2025-05-15
Attending: INTERNAL MEDICINE
Payer: MEDICARE

## 2025-05-15 LAB
RAD ONC ARIA COURSE FIRST TREATMENT DATE: NORMAL
RAD ONC ARIA COURSE ID: NORMAL
RAD ONC ARIA COURSE INTENT: NORMAL
RAD ONC ARIA COURSE LAST TREATMENT DATE: NORMAL
RAD ONC ARIA COURSE SESSION NUMBER: 4
RAD ONC ARIA COURSE TREATMENT ELAPSED DAYS: 3
RAD ONC ARIA PLAN FRACTIONS TREATED TO DATE: 4
RAD ONC ARIA PLAN ID: NORMAL
RAD ONC ARIA PLAN NAME: NORMAL
RAD ONC ARIA PLAN PRESCRIBED DOSE PER FRACTION: 2.5 GY
RAD ONC ARIA PLAN PRIMARY REFERENCE POINT: NORMAL
RAD ONC ARIA PLAN TOTAL FRACTIONS PRESCRIBED: 28
RAD ONC ARIA PLAN TOTAL PRESCRIBED DOSE: 7000 CGY
RAD ONC ARIA REFERENCE POINT DOSAGE GIVEN TO DATE: 10 GY
RAD ONC ARIA REFERENCE POINT ID: NORMAL
RAD ONC ARIA REFERENCE POINT SESSION DOSAGE GIVEN: 2.5 GY

## 2025-05-15 PROCEDURE — 77385 HB NTSTY MODUL RAD TX DLVR SMPL: CPT | Performed by: RADIOLOGY

## 2025-05-15 PROCEDURE — 77014 CHG CT GUIDANCE RADIATION THERAPY FLDS PLACEMENT: CPT | Performed by: RADIOLOGY

## 2025-05-16 ENCOUNTER — APPOINTMENT (OUTPATIENT)
Dept: RADIATION ONCOLOGY | Facility: CLINIC | Age: 70
End: 2025-05-16
Attending: INTERNAL MEDICINE
Payer: MEDICARE

## 2025-05-16 LAB
RAD ONC ARIA COURSE FIRST TREATMENT DATE: NORMAL
RAD ONC ARIA COURSE ID: NORMAL
RAD ONC ARIA COURSE INTENT: NORMAL
RAD ONC ARIA COURSE LAST TREATMENT DATE: NORMAL
RAD ONC ARIA COURSE SESSION NUMBER: 5
RAD ONC ARIA COURSE TREATMENT ELAPSED DAYS: 4
RAD ONC ARIA PLAN FRACTIONS TREATED TO DATE: 5
RAD ONC ARIA PLAN ID: NORMAL
RAD ONC ARIA PLAN NAME: NORMAL
RAD ONC ARIA PLAN PRESCRIBED DOSE PER FRACTION: 2.5 GY
RAD ONC ARIA PLAN PRIMARY REFERENCE POINT: NORMAL
RAD ONC ARIA PLAN TOTAL FRACTIONS PRESCRIBED: 28
RAD ONC ARIA PLAN TOTAL PRESCRIBED DOSE: 7000 CGY
RAD ONC ARIA REFERENCE POINT DOSAGE GIVEN TO DATE: 12.5 GY
RAD ONC ARIA REFERENCE POINT ID: NORMAL
RAD ONC ARIA REFERENCE POINT SESSION DOSAGE GIVEN: 2.5 GY

## 2025-05-16 PROCEDURE — 77014 CHG CT GUIDANCE RADIATION THERAPY FLDS PLACEMENT: CPT | Performed by: RADIOLOGY

## 2025-05-16 PROCEDURE — 77385 HB NTSTY MODUL RAD TX DLVR SMPL: CPT | Performed by: RADIOLOGY

## 2025-05-16 PROCEDURE — 77336 RADIATION PHYSICS CONSULT: CPT | Performed by: RADIOLOGY

## 2025-05-19 ENCOUNTER — APPOINTMENT (OUTPATIENT)
Dept: RADIATION ONCOLOGY | Facility: CLINIC | Age: 70
End: 2025-05-19
Attending: RADIOLOGY
Payer: MEDICARE

## 2025-05-19 ENCOUNTER — APPOINTMENT (OUTPATIENT)
Dept: RADIATION ONCOLOGY | Facility: CLINIC | Age: 70
End: 2025-05-19
Attending: INTERNAL MEDICINE
Payer: MEDICARE

## 2025-05-19 LAB
RAD ONC ARIA COURSE FIRST TREATMENT DATE: NORMAL
RAD ONC ARIA COURSE ID: NORMAL
RAD ONC ARIA COURSE INTENT: NORMAL
RAD ONC ARIA COURSE LAST TREATMENT DATE: NORMAL
RAD ONC ARIA COURSE SESSION NUMBER: 6
RAD ONC ARIA COURSE TREATMENT ELAPSED DAYS: 7
RAD ONC ARIA PLAN FRACTIONS TREATED TO DATE: 6
RAD ONC ARIA PLAN ID: NORMAL
RAD ONC ARIA PLAN NAME: NORMAL
RAD ONC ARIA PLAN PRESCRIBED DOSE PER FRACTION: 2.5 GY
RAD ONC ARIA PLAN PRIMARY REFERENCE POINT: NORMAL
RAD ONC ARIA PLAN TOTAL FRACTIONS PRESCRIBED: 28
RAD ONC ARIA PLAN TOTAL PRESCRIBED DOSE: 7000 CGY
RAD ONC ARIA REFERENCE POINT DOSAGE GIVEN TO DATE: 15 GY
RAD ONC ARIA REFERENCE POINT ID: NORMAL
RAD ONC ARIA REFERENCE POINT SESSION DOSAGE GIVEN: 2.5 GY

## 2025-05-19 PROCEDURE — 77385 HB NTSTY MODUL RAD TX DLVR SMPL: CPT | Performed by: RADIOLOGY

## 2025-05-19 PROCEDURE — 77427 RADIATION TX MANAGEMENT X5: CPT | Performed by: RADIOLOGY

## 2025-05-19 PROCEDURE — 77014 CHG CT GUIDANCE RADIATION THERAPY FLDS PLACEMENT: CPT | Performed by: RADIOLOGY

## 2025-05-20 ENCOUNTER — APPOINTMENT (OUTPATIENT)
Dept: RADIATION ONCOLOGY | Facility: CLINIC | Age: 70
End: 2025-05-20
Attending: RADIOLOGY
Payer: MEDICARE

## 2025-05-20 LAB
RAD ONC ARIA COURSE FIRST TREATMENT DATE: NORMAL
RAD ONC ARIA COURSE ID: NORMAL
RAD ONC ARIA COURSE INTENT: NORMAL
RAD ONC ARIA COURSE LAST TREATMENT DATE: NORMAL
RAD ONC ARIA COURSE SESSION NUMBER: 7
RAD ONC ARIA COURSE TREATMENT ELAPSED DAYS: 8
RAD ONC ARIA PLAN FRACTIONS TREATED TO DATE: 7
RAD ONC ARIA PLAN ID: NORMAL
RAD ONC ARIA PLAN NAME: NORMAL
RAD ONC ARIA PLAN PRESCRIBED DOSE PER FRACTION: 2.5 GY
RAD ONC ARIA PLAN PRIMARY REFERENCE POINT: NORMAL
RAD ONC ARIA PLAN TOTAL FRACTIONS PRESCRIBED: 28
RAD ONC ARIA PLAN TOTAL PRESCRIBED DOSE: 7000 CGY
RAD ONC ARIA REFERENCE POINT DOSAGE GIVEN TO DATE: 17.5 GY
RAD ONC ARIA REFERENCE POINT ID: NORMAL
RAD ONC ARIA REFERENCE POINT SESSION DOSAGE GIVEN: 2.5 GY

## 2025-05-20 PROCEDURE — 77385 HB NTSTY MODUL RAD TX DLVR SMPL: CPT | Performed by: INTERNAL MEDICINE

## 2025-05-21 ENCOUNTER — APPOINTMENT (OUTPATIENT)
Dept: RADIATION ONCOLOGY | Facility: CLINIC | Age: 70
End: 2025-05-21
Attending: RADIOLOGY
Payer: MEDICARE

## 2025-05-21 LAB
RAD ONC ARIA COURSE FIRST TREATMENT DATE: NORMAL
RAD ONC ARIA COURSE ID: NORMAL
RAD ONC ARIA COURSE INTENT: NORMAL
RAD ONC ARIA COURSE LAST TREATMENT DATE: NORMAL
RAD ONC ARIA COURSE SESSION NUMBER: 8
RAD ONC ARIA COURSE TREATMENT ELAPSED DAYS: 9
RAD ONC ARIA PLAN FRACTIONS TREATED TO DATE: 8
RAD ONC ARIA PLAN ID: NORMAL
RAD ONC ARIA PLAN NAME: NORMAL
RAD ONC ARIA PLAN PRESCRIBED DOSE PER FRACTION: 2.5 GY
RAD ONC ARIA PLAN PRIMARY REFERENCE POINT: NORMAL
RAD ONC ARIA PLAN TOTAL FRACTIONS PRESCRIBED: 28
RAD ONC ARIA PLAN TOTAL PRESCRIBED DOSE: 7000 CGY
RAD ONC ARIA REFERENCE POINT DOSAGE GIVEN TO DATE: 20 GY
RAD ONC ARIA REFERENCE POINT ID: NORMAL
RAD ONC ARIA REFERENCE POINT SESSION DOSAGE GIVEN: 2.5 GY

## 2025-05-21 PROCEDURE — 77014 CHG CT GUIDANCE RADIATION THERAPY FLDS PLACEMENT: CPT | Performed by: RADIOLOGY

## 2025-05-21 PROCEDURE — 77385 HB NTSTY MODUL RAD TX DLVR SMPL: CPT | Performed by: RADIOLOGY

## 2025-05-22 ENCOUNTER — TELEPHONE (OUTPATIENT)
Dept: RADIATION ONCOLOGY | Facility: CLINIC | Age: 70
End: 2025-05-22

## 2025-05-22 ENCOUNTER — APPOINTMENT (OUTPATIENT)
Dept: RADIATION ONCOLOGY | Facility: CLINIC | Age: 70
End: 2025-05-22
Attending: INTERNAL MEDICINE
Payer: MEDICARE

## 2025-05-22 NOTE — TELEPHONE ENCOUNTER
Spoke to patient about missed Radiation appointment today. He reported that he was having pain throughout his body, so he missed treatment today. He reported that he is seeing palliative care on 6/20. Discussed that his missed treatment will be added on at the end, so he will receive the same number of treatments. Encouraged to call our office in future, if missing appointments. Office number provided.

## 2025-05-23 ENCOUNTER — APPOINTMENT (OUTPATIENT)
Dept: RADIATION ONCOLOGY | Facility: CLINIC | Age: 70
End: 2025-05-23
Attending: INTERNAL MEDICINE
Payer: MEDICARE

## 2025-05-23 PROCEDURE — 77014 CHG CT GUIDANCE RADIATION THERAPY FLDS PLACEMENT: CPT | Performed by: STUDENT IN AN ORGANIZED HEALTH CARE EDUCATION/TRAINING PROGRAM

## 2025-05-23 PROCEDURE — 77385 HB NTSTY MODUL RAD TX DLVR SMPL: CPT | Performed by: STUDENT IN AN ORGANIZED HEALTH CARE EDUCATION/TRAINING PROGRAM

## 2025-05-27 ENCOUNTER — APPOINTMENT (OUTPATIENT)
Dept: RADIATION ONCOLOGY | Facility: CLINIC | Age: 70
End: 2025-05-27
Attending: RADIOLOGY
Payer: MEDICARE

## 2025-05-27 DIAGNOSIS — C61 PROSTATE CANCER (HCC): Primary | ICD-10-CM

## 2025-05-27 LAB
RAD ONC ARIA COURSE FIRST TREATMENT DATE: NORMAL
RAD ONC ARIA COURSE FIRST TREATMENT DATE: NORMAL
RAD ONC ARIA COURSE ID: NORMAL
RAD ONC ARIA COURSE ID: NORMAL
RAD ONC ARIA COURSE INTENT: NORMAL
RAD ONC ARIA COURSE INTENT: NORMAL
RAD ONC ARIA COURSE LAST TREATMENT DATE: NORMAL
RAD ONC ARIA COURSE LAST TREATMENT DATE: NORMAL
RAD ONC ARIA COURSE SESSION NUMBER: 10
RAD ONC ARIA COURSE SESSION NUMBER: 9
RAD ONC ARIA COURSE TREATMENT ELAPSED DAYS: 11
RAD ONC ARIA COURSE TREATMENT ELAPSED DAYS: 15
RAD ONC ARIA PLAN FRACTIONS TREATED TO DATE: 10
RAD ONC ARIA PLAN FRACTIONS TREATED TO DATE: 9
RAD ONC ARIA PLAN ID: NORMAL
RAD ONC ARIA PLAN ID: NORMAL
RAD ONC ARIA PLAN NAME: NORMAL
RAD ONC ARIA PLAN NAME: NORMAL
RAD ONC ARIA PLAN PRESCRIBED DOSE PER FRACTION: 2.5 GY
RAD ONC ARIA PLAN PRESCRIBED DOSE PER FRACTION: 2.5 GY
RAD ONC ARIA PLAN PRIMARY REFERENCE POINT: NORMAL
RAD ONC ARIA PLAN PRIMARY REFERENCE POINT: NORMAL
RAD ONC ARIA PLAN TOTAL FRACTIONS PRESCRIBED: 28
RAD ONC ARIA PLAN TOTAL FRACTIONS PRESCRIBED: 28
RAD ONC ARIA PLAN TOTAL PRESCRIBED DOSE: 7000 CGY
RAD ONC ARIA PLAN TOTAL PRESCRIBED DOSE: 7000 CGY
RAD ONC ARIA REFERENCE POINT DOSAGE GIVEN TO DATE: 22.5 GY
RAD ONC ARIA REFERENCE POINT DOSAGE GIVEN TO DATE: 25 GY
RAD ONC ARIA REFERENCE POINT ID: NORMAL
RAD ONC ARIA REFERENCE POINT ID: NORMAL
RAD ONC ARIA REFERENCE POINT SESSION DOSAGE GIVEN: 2.5 GY
RAD ONC ARIA REFERENCE POINT SESSION DOSAGE GIVEN: 2.5 GY

## 2025-05-27 PROCEDURE — 77014 CHG CT GUIDANCE RADIATION THERAPY FLDS PLACEMENT: CPT | Performed by: RADIOLOGY

## 2025-05-27 PROCEDURE — 77385 HB NTSTY MODUL RAD TX DLVR SMPL: CPT | Performed by: RADIOLOGY

## 2025-05-27 PROCEDURE — 77336 RADIATION PHYSICS CONSULT: CPT | Performed by: RADIOLOGY

## 2025-05-27 RX ORDER — TAMSULOSIN HYDROCHLORIDE 0.4 MG/1
0.4 CAPSULE ORAL
Qty: 30 CAPSULE | Refills: 3 | Status: SHIPPED | OUTPATIENT
Start: 2025-05-27

## 2025-05-28 ENCOUNTER — APPOINTMENT (OUTPATIENT)
Dept: RADIATION ONCOLOGY | Facility: CLINIC | Age: 70
End: 2025-05-28
Attending: RADIOLOGY
Payer: MEDICARE

## 2025-05-28 LAB
RAD ONC ARIA COURSE FIRST TREATMENT DATE: NORMAL
RAD ONC ARIA COURSE ID: NORMAL
RAD ONC ARIA COURSE INTENT: NORMAL
RAD ONC ARIA COURSE LAST TREATMENT DATE: NORMAL
RAD ONC ARIA COURSE SESSION NUMBER: 11
RAD ONC ARIA COURSE TREATMENT ELAPSED DAYS: 16
RAD ONC ARIA PLAN FRACTIONS TREATED TO DATE: 11
RAD ONC ARIA PLAN ID: NORMAL
RAD ONC ARIA PLAN NAME: NORMAL
RAD ONC ARIA PLAN PRESCRIBED DOSE PER FRACTION: 2.5 GY
RAD ONC ARIA PLAN PRIMARY REFERENCE POINT: NORMAL
RAD ONC ARIA PLAN TOTAL FRACTIONS PRESCRIBED: 28
RAD ONC ARIA PLAN TOTAL PRESCRIBED DOSE: 7000 CGY
RAD ONC ARIA REFERENCE POINT DOSAGE GIVEN TO DATE: 27.5 GY
RAD ONC ARIA REFERENCE POINT ID: NORMAL
RAD ONC ARIA REFERENCE POINT SESSION DOSAGE GIVEN: 2.5 GY

## 2025-05-28 PROCEDURE — 77427 RADIATION TX MANAGEMENT X5: CPT | Performed by: RADIOLOGY

## 2025-05-28 PROCEDURE — 77014 CHG CT GUIDANCE RADIATION THERAPY FLDS PLACEMENT: CPT | Performed by: INTERNAL MEDICINE

## 2025-05-28 PROCEDURE — 77385 HB NTSTY MODUL RAD TX DLVR SMPL: CPT | Performed by: INTERNAL MEDICINE

## 2025-05-29 ENCOUNTER — APPOINTMENT (OUTPATIENT)
Dept: RADIATION ONCOLOGY | Facility: CLINIC | Age: 70
End: 2025-05-29
Attending: RADIOLOGY
Payer: MEDICARE

## 2025-05-29 LAB
RAD ONC ARIA COURSE FIRST TREATMENT DATE: NORMAL
RAD ONC ARIA COURSE ID: NORMAL
RAD ONC ARIA COURSE INTENT: NORMAL
RAD ONC ARIA COURSE LAST TREATMENT DATE: NORMAL
RAD ONC ARIA COURSE SESSION NUMBER: 12
RAD ONC ARIA COURSE TREATMENT ELAPSED DAYS: 17
RAD ONC ARIA PLAN FRACTIONS TREATED TO DATE: 12
RAD ONC ARIA PLAN ID: NORMAL
RAD ONC ARIA PLAN NAME: NORMAL
RAD ONC ARIA PLAN PRESCRIBED DOSE PER FRACTION: 2.5 GY
RAD ONC ARIA PLAN PRIMARY REFERENCE POINT: NORMAL
RAD ONC ARIA PLAN TOTAL FRACTIONS PRESCRIBED: 28
RAD ONC ARIA PLAN TOTAL PRESCRIBED DOSE: 7000 CGY
RAD ONC ARIA REFERENCE POINT DOSAGE GIVEN TO DATE: 30 GY
RAD ONC ARIA REFERENCE POINT ID: NORMAL
RAD ONC ARIA REFERENCE POINT SESSION DOSAGE GIVEN: 2.5 GY

## 2025-05-29 PROCEDURE — 77014 CHG CT GUIDANCE RADIATION THERAPY FLDS PLACEMENT: CPT | Performed by: STUDENT IN AN ORGANIZED HEALTH CARE EDUCATION/TRAINING PROGRAM

## 2025-05-29 PROCEDURE — 77385 HB NTSTY MODUL RAD TX DLVR SMPL: CPT | Performed by: STUDENT IN AN ORGANIZED HEALTH CARE EDUCATION/TRAINING PROGRAM

## 2025-05-30 ENCOUNTER — APPOINTMENT (OUTPATIENT)
Dept: RADIATION ONCOLOGY | Facility: CLINIC | Age: 70
End: 2025-05-30
Attending: RADIOLOGY
Payer: MEDICARE

## 2025-05-30 LAB
RAD ONC ARIA COURSE FIRST TREATMENT DATE: NORMAL
RAD ONC ARIA COURSE ID: NORMAL
RAD ONC ARIA COURSE INTENT: NORMAL
RAD ONC ARIA COURSE LAST TREATMENT DATE: NORMAL
RAD ONC ARIA COURSE SESSION NUMBER: 13
RAD ONC ARIA COURSE TREATMENT ELAPSED DAYS: 18
RAD ONC ARIA PLAN FRACTIONS TREATED TO DATE: 13
RAD ONC ARIA PLAN ID: NORMAL
RAD ONC ARIA PLAN NAME: NORMAL
RAD ONC ARIA PLAN PRESCRIBED DOSE PER FRACTION: 2.5 GY
RAD ONC ARIA PLAN PRIMARY REFERENCE POINT: NORMAL
RAD ONC ARIA PLAN TOTAL FRACTIONS PRESCRIBED: 28
RAD ONC ARIA PLAN TOTAL PRESCRIBED DOSE: 7000 CGY
RAD ONC ARIA REFERENCE POINT DOSAGE GIVEN TO DATE: 32.5 GY
RAD ONC ARIA REFERENCE POINT ID: NORMAL
RAD ONC ARIA REFERENCE POINT SESSION DOSAGE GIVEN: 2.5 GY

## 2025-05-30 PROCEDURE — 77014 CHG CT GUIDANCE RADIATION THERAPY FLDS PLACEMENT: CPT | Performed by: RADIOLOGY

## 2025-05-30 PROCEDURE — 77385 HB NTSTY MODUL RAD TX DLVR SMPL: CPT | Performed by: RADIOLOGY

## 2025-06-02 ENCOUNTER — APPOINTMENT (OUTPATIENT)
Dept: RADIATION ONCOLOGY | Facility: CLINIC | Age: 70
End: 2025-06-02
Attending: RADIOLOGY
Payer: MEDICARE

## 2025-06-02 LAB
RAD ONC ARIA COURSE FIRST TREATMENT DATE: NORMAL
RAD ONC ARIA COURSE ID: NORMAL
RAD ONC ARIA COURSE INTENT: NORMAL
RAD ONC ARIA COURSE LAST TREATMENT DATE: NORMAL
RAD ONC ARIA COURSE SESSION NUMBER: 14
RAD ONC ARIA COURSE TREATMENT ELAPSED DAYS: 21
RAD ONC ARIA PLAN FRACTIONS TREATED TO DATE: 14
RAD ONC ARIA PLAN ID: NORMAL
RAD ONC ARIA PLAN NAME: NORMAL
RAD ONC ARIA PLAN PRESCRIBED DOSE PER FRACTION: 2.5 GY
RAD ONC ARIA PLAN PRIMARY REFERENCE POINT: NORMAL
RAD ONC ARIA PLAN TOTAL FRACTIONS PRESCRIBED: 28
RAD ONC ARIA PLAN TOTAL PRESCRIBED DOSE: 7000 CGY
RAD ONC ARIA REFERENCE POINT DOSAGE GIVEN TO DATE: 35 GY
RAD ONC ARIA REFERENCE POINT ID: NORMAL
RAD ONC ARIA REFERENCE POINT SESSION DOSAGE GIVEN: 2.5 GY

## 2025-06-02 PROCEDURE — 77014 CHG CT GUIDANCE RADIATION THERAPY FLDS PLACEMENT: CPT | Performed by: RADIOLOGY

## 2025-06-02 PROCEDURE — 77385 HB NTSTY MODUL RAD TX DLVR SMPL: CPT | Performed by: RADIOLOGY

## 2025-06-03 ENCOUNTER — APPOINTMENT (OUTPATIENT)
Dept: RADIATION ONCOLOGY | Facility: CLINIC | Age: 70
End: 2025-06-03
Attending: RADIOLOGY
Payer: MEDICARE

## 2025-06-03 LAB
RAD ONC ARIA COURSE FIRST TREATMENT DATE: NORMAL
RAD ONC ARIA COURSE ID: NORMAL
RAD ONC ARIA COURSE INTENT: NORMAL
RAD ONC ARIA COURSE LAST TREATMENT DATE: NORMAL
RAD ONC ARIA COURSE SESSION NUMBER: 15
RAD ONC ARIA COURSE TREATMENT ELAPSED DAYS: 22
RAD ONC ARIA PLAN FRACTIONS TREATED TO DATE: 15
RAD ONC ARIA PLAN ID: NORMAL
RAD ONC ARIA PLAN NAME: NORMAL
RAD ONC ARIA PLAN PRESCRIBED DOSE PER FRACTION: 2.5 GY
RAD ONC ARIA PLAN PRIMARY REFERENCE POINT: NORMAL
RAD ONC ARIA PLAN TOTAL FRACTIONS PRESCRIBED: 28
RAD ONC ARIA PLAN TOTAL PRESCRIBED DOSE: 7000 CGY
RAD ONC ARIA REFERENCE POINT DOSAGE GIVEN TO DATE: 37.5 GY
RAD ONC ARIA REFERENCE POINT ID: NORMAL
RAD ONC ARIA REFERENCE POINT SESSION DOSAGE GIVEN: 2.5 GY

## 2025-06-03 PROCEDURE — 77014 CHG CT GUIDANCE RADIATION THERAPY FLDS PLACEMENT: CPT | Performed by: RADIOLOGY

## 2025-06-03 PROCEDURE — 77336 RADIATION PHYSICS CONSULT: CPT | Performed by: RADIOLOGY

## 2025-06-03 PROCEDURE — 77385 HB NTSTY MODUL RAD TX DLVR SMPL: CPT | Performed by: RADIOLOGY

## 2025-06-04 ENCOUNTER — APPOINTMENT (OUTPATIENT)
Dept: RADIATION ONCOLOGY | Facility: CLINIC | Age: 70
End: 2025-06-04
Attending: RADIOLOGY
Payer: MEDICARE

## 2025-06-04 LAB
RAD ONC ARIA COURSE FIRST TREATMENT DATE: NORMAL
RAD ONC ARIA COURSE ID: NORMAL
RAD ONC ARIA COURSE INTENT: NORMAL
RAD ONC ARIA COURSE LAST TREATMENT DATE: NORMAL
RAD ONC ARIA COURSE SESSION NUMBER: 16
RAD ONC ARIA COURSE TREATMENT ELAPSED DAYS: 23
RAD ONC ARIA PLAN FRACTIONS TREATED TO DATE: 16
RAD ONC ARIA PLAN ID: NORMAL
RAD ONC ARIA PLAN NAME: NORMAL
RAD ONC ARIA PLAN PRESCRIBED DOSE PER FRACTION: 2.5 GY
RAD ONC ARIA PLAN PRIMARY REFERENCE POINT: NORMAL
RAD ONC ARIA PLAN TOTAL FRACTIONS PRESCRIBED: 28
RAD ONC ARIA PLAN TOTAL PRESCRIBED DOSE: 7000 CGY
RAD ONC ARIA REFERENCE POINT DOSAGE GIVEN TO DATE: 40 GY
RAD ONC ARIA REFERENCE POINT ID: NORMAL
RAD ONC ARIA REFERENCE POINT SESSION DOSAGE GIVEN: 2.5 GY

## 2025-06-04 PROCEDURE — 77014 CHG CT GUIDANCE RADIATION THERAPY FLDS PLACEMENT: CPT | Performed by: INTERNAL MEDICINE

## 2025-06-04 PROCEDURE — 77385 HB NTSTY MODUL RAD TX DLVR SMPL: CPT | Performed by: INTERNAL MEDICINE

## 2025-06-04 PROCEDURE — 77427 RADIATION TX MANAGEMENT X5: CPT | Performed by: RADIOLOGY

## 2025-06-05 ENCOUNTER — APPOINTMENT (OUTPATIENT)
Dept: RADIATION ONCOLOGY | Facility: CLINIC | Age: 70
End: 2025-06-05
Attending: RADIOLOGY
Payer: MEDICARE

## 2025-06-05 LAB
RAD ONC ARIA COURSE FIRST TREATMENT DATE: NORMAL
RAD ONC ARIA COURSE ID: NORMAL
RAD ONC ARIA COURSE INTENT: NORMAL
RAD ONC ARIA COURSE LAST TREATMENT DATE: NORMAL
RAD ONC ARIA COURSE SESSION NUMBER: 17
RAD ONC ARIA COURSE TREATMENT ELAPSED DAYS: 24
RAD ONC ARIA PLAN FRACTIONS TREATED TO DATE: 17
RAD ONC ARIA PLAN ID: NORMAL
RAD ONC ARIA PLAN NAME: NORMAL
RAD ONC ARIA PLAN PRESCRIBED DOSE PER FRACTION: 2.5 GY
RAD ONC ARIA PLAN PRIMARY REFERENCE POINT: NORMAL
RAD ONC ARIA PLAN TOTAL FRACTIONS PRESCRIBED: 28
RAD ONC ARIA PLAN TOTAL PRESCRIBED DOSE: 7000 CGY
RAD ONC ARIA REFERENCE POINT DOSAGE GIVEN TO DATE: 42.5 GY
RAD ONC ARIA REFERENCE POINT ID: NORMAL
RAD ONC ARIA REFERENCE POINT SESSION DOSAGE GIVEN: 2.5 GY

## 2025-06-05 PROCEDURE — 77385 HB NTSTY MODUL RAD TX DLVR SMPL: CPT | Performed by: STUDENT IN AN ORGANIZED HEALTH CARE EDUCATION/TRAINING PROGRAM

## 2025-06-05 PROCEDURE — 77014 CHG CT GUIDANCE RADIATION THERAPY FLDS PLACEMENT: CPT | Performed by: STUDENT IN AN ORGANIZED HEALTH CARE EDUCATION/TRAINING PROGRAM

## 2025-06-06 ENCOUNTER — APPOINTMENT (OUTPATIENT)
Dept: RADIATION ONCOLOGY | Facility: CLINIC | Age: 70
End: 2025-06-06
Attending: RADIOLOGY
Payer: MEDICARE

## 2025-06-06 LAB
RAD ONC ARIA COURSE FIRST TREATMENT DATE: NORMAL
RAD ONC ARIA COURSE ID: NORMAL
RAD ONC ARIA COURSE INTENT: NORMAL
RAD ONC ARIA COURSE LAST TREATMENT DATE: NORMAL
RAD ONC ARIA COURSE SESSION NUMBER: 18
RAD ONC ARIA COURSE TREATMENT ELAPSED DAYS: 25
RAD ONC ARIA PLAN FRACTIONS TREATED TO DATE: 18
RAD ONC ARIA PLAN ID: NORMAL
RAD ONC ARIA PLAN NAME: NORMAL
RAD ONC ARIA PLAN PRESCRIBED DOSE PER FRACTION: 2.5 GY
RAD ONC ARIA PLAN PRIMARY REFERENCE POINT: NORMAL
RAD ONC ARIA PLAN TOTAL FRACTIONS PRESCRIBED: 28
RAD ONC ARIA PLAN TOTAL PRESCRIBED DOSE: 7000 CGY
RAD ONC ARIA REFERENCE POINT DOSAGE GIVEN TO DATE: 45 GY
RAD ONC ARIA REFERENCE POINT ID: NORMAL
RAD ONC ARIA REFERENCE POINT SESSION DOSAGE GIVEN: 2.5 GY

## 2025-06-06 PROCEDURE — 77014 CHG CT GUIDANCE RADIATION THERAPY FLDS PLACEMENT: CPT | Performed by: RADIOLOGY

## 2025-06-06 PROCEDURE — 77385 HB NTSTY MODUL RAD TX DLVR SMPL: CPT | Performed by: RADIOLOGY

## 2025-06-09 ENCOUNTER — APPOINTMENT (OUTPATIENT)
Dept: RADIATION ONCOLOGY | Facility: CLINIC | Age: 70
End: 2025-06-09
Attending: RADIOLOGY
Payer: MEDICARE

## 2025-06-09 LAB
RAD ONC ARIA COURSE FIRST TREATMENT DATE: NORMAL
RAD ONC ARIA COURSE ID: NORMAL
RAD ONC ARIA COURSE INTENT: NORMAL
RAD ONC ARIA COURSE LAST TREATMENT DATE: NORMAL
RAD ONC ARIA COURSE SESSION NUMBER: 19
RAD ONC ARIA COURSE TREATMENT ELAPSED DAYS: 28
RAD ONC ARIA PLAN FRACTIONS TREATED TO DATE: 19
RAD ONC ARIA PLAN ID: NORMAL
RAD ONC ARIA PLAN NAME: NORMAL
RAD ONC ARIA PLAN PRESCRIBED DOSE PER FRACTION: 2.5 GY
RAD ONC ARIA PLAN PRIMARY REFERENCE POINT: NORMAL
RAD ONC ARIA PLAN TOTAL FRACTIONS PRESCRIBED: 28
RAD ONC ARIA PLAN TOTAL PRESCRIBED DOSE: 7000 CGY
RAD ONC ARIA REFERENCE POINT DOSAGE GIVEN TO DATE: 47.5 GY
RAD ONC ARIA REFERENCE POINT ID: NORMAL
RAD ONC ARIA REFERENCE POINT SESSION DOSAGE GIVEN: 2.5 GY

## 2025-06-09 PROCEDURE — 77014 CHG CT GUIDANCE RADIATION THERAPY FLDS PLACEMENT: CPT | Performed by: RADIOLOGY

## 2025-06-09 PROCEDURE — 77385 HB NTSTY MODUL RAD TX DLVR SMPL: CPT | Performed by: RADIOLOGY

## 2025-06-10 ENCOUNTER — APPOINTMENT (OUTPATIENT)
Dept: RADIATION ONCOLOGY | Facility: CLINIC | Age: 70
End: 2025-06-10
Attending: RADIOLOGY
Payer: MEDICARE

## 2025-06-10 LAB
RAD ONC ARIA COURSE FIRST TREATMENT DATE: NORMAL
RAD ONC ARIA COURSE ID: NORMAL
RAD ONC ARIA COURSE INTENT: NORMAL
RAD ONC ARIA COURSE LAST TREATMENT DATE: NORMAL
RAD ONC ARIA COURSE SESSION NUMBER: 20
RAD ONC ARIA COURSE TREATMENT ELAPSED DAYS: 29
RAD ONC ARIA PLAN FRACTIONS TREATED TO DATE: 20
RAD ONC ARIA PLAN ID: NORMAL
RAD ONC ARIA PLAN NAME: NORMAL
RAD ONC ARIA PLAN PRESCRIBED DOSE PER FRACTION: 2.5 GY
RAD ONC ARIA PLAN PRIMARY REFERENCE POINT: NORMAL
RAD ONC ARIA PLAN TOTAL FRACTIONS PRESCRIBED: 28
RAD ONC ARIA PLAN TOTAL PRESCRIBED DOSE: 7000 CGY
RAD ONC ARIA REFERENCE POINT DOSAGE GIVEN TO DATE: 50 GY
RAD ONC ARIA REFERENCE POINT ID: NORMAL
RAD ONC ARIA REFERENCE POINT SESSION DOSAGE GIVEN: 2.5 GY

## 2025-06-10 PROCEDURE — 77336 RADIATION PHYSICS CONSULT: CPT | Performed by: RADIOLOGY

## 2025-06-10 PROCEDURE — 77385 HB NTSTY MODUL RAD TX DLVR SMPL: CPT | Performed by: RADIOLOGY

## 2025-06-11 ENCOUNTER — APPOINTMENT (OUTPATIENT)
Dept: RADIATION ONCOLOGY | Facility: CLINIC | Age: 70
End: 2025-06-11
Attending: RADIOLOGY
Payer: MEDICARE

## 2025-06-11 LAB
RAD ONC ARIA COURSE FIRST TREATMENT DATE: NORMAL
RAD ONC ARIA COURSE ID: NORMAL
RAD ONC ARIA COURSE INTENT: NORMAL
RAD ONC ARIA COURSE LAST TREATMENT DATE: NORMAL
RAD ONC ARIA COURSE SESSION NUMBER: 21
RAD ONC ARIA COURSE TREATMENT ELAPSED DAYS: 30
RAD ONC ARIA PLAN FRACTIONS TREATED TO DATE: 21
RAD ONC ARIA PLAN ID: NORMAL
RAD ONC ARIA PLAN NAME: NORMAL
RAD ONC ARIA PLAN PRESCRIBED DOSE PER FRACTION: 2.5 GY
RAD ONC ARIA PLAN PRIMARY REFERENCE POINT: NORMAL
RAD ONC ARIA PLAN TOTAL FRACTIONS PRESCRIBED: 28
RAD ONC ARIA PLAN TOTAL PRESCRIBED DOSE: 7000 CGY
RAD ONC ARIA REFERENCE POINT DOSAGE GIVEN TO DATE: 52.5 GY
RAD ONC ARIA REFERENCE POINT ID: NORMAL
RAD ONC ARIA REFERENCE POINT SESSION DOSAGE GIVEN: 2.5 GY

## 2025-06-11 PROCEDURE — 77385 HB NTSTY MODUL RAD TX DLVR SMPL: CPT | Performed by: INTERNAL MEDICINE

## 2025-06-11 PROCEDURE — 77427 RADIATION TX MANAGEMENT X5: CPT | Performed by: RADIOLOGY

## 2025-06-11 PROCEDURE — 77014 CHG CT GUIDANCE RADIATION THERAPY FLDS PLACEMENT: CPT | Performed by: INTERNAL MEDICINE

## 2025-06-12 ENCOUNTER — APPOINTMENT (OUTPATIENT)
Dept: RADIATION ONCOLOGY | Facility: CLINIC | Age: 70
End: 2025-06-12
Attending: RADIOLOGY
Payer: MEDICARE

## 2025-06-12 LAB
RAD ONC ARIA COURSE FIRST TREATMENT DATE: NORMAL
RAD ONC ARIA COURSE ID: NORMAL
RAD ONC ARIA COURSE INTENT: NORMAL
RAD ONC ARIA COURSE LAST TREATMENT DATE: NORMAL
RAD ONC ARIA COURSE SESSION NUMBER: 22
RAD ONC ARIA COURSE TREATMENT ELAPSED DAYS: 31
RAD ONC ARIA PLAN FRACTIONS TREATED TO DATE: 22
RAD ONC ARIA PLAN ID: NORMAL
RAD ONC ARIA PLAN NAME: NORMAL
RAD ONC ARIA PLAN PRESCRIBED DOSE PER FRACTION: 2.5 GY
RAD ONC ARIA PLAN PRIMARY REFERENCE POINT: NORMAL
RAD ONC ARIA PLAN TOTAL FRACTIONS PRESCRIBED: 28
RAD ONC ARIA PLAN TOTAL PRESCRIBED DOSE: 7000 CGY
RAD ONC ARIA REFERENCE POINT DOSAGE GIVEN TO DATE: 55 GY
RAD ONC ARIA REFERENCE POINT ID: NORMAL
RAD ONC ARIA REFERENCE POINT SESSION DOSAGE GIVEN: 2.5 GY

## 2025-06-12 PROCEDURE — 77385 HB NTSTY MODUL RAD TX DLVR SMPL: CPT | Performed by: STUDENT IN AN ORGANIZED HEALTH CARE EDUCATION/TRAINING PROGRAM

## 2025-06-12 PROCEDURE — 77014 CHG CT GUIDANCE RADIATION THERAPY FLDS PLACEMENT: CPT | Performed by: STUDENT IN AN ORGANIZED HEALTH CARE EDUCATION/TRAINING PROGRAM

## 2025-06-13 ENCOUNTER — APPOINTMENT (OUTPATIENT)
Dept: RADIATION ONCOLOGY | Facility: CLINIC | Age: 70
End: 2025-06-13
Attending: RADIOLOGY
Payer: MEDICARE

## 2025-06-13 LAB
RAD ONC ARIA COURSE FIRST TREATMENT DATE: NORMAL
RAD ONC ARIA COURSE ID: NORMAL
RAD ONC ARIA COURSE INTENT: NORMAL
RAD ONC ARIA COURSE LAST TREATMENT DATE: NORMAL
RAD ONC ARIA COURSE SESSION NUMBER: 23
RAD ONC ARIA COURSE TREATMENT ELAPSED DAYS: 32
RAD ONC ARIA PLAN FRACTIONS TREATED TO DATE: 23
RAD ONC ARIA PLAN ID: NORMAL
RAD ONC ARIA PLAN NAME: NORMAL
RAD ONC ARIA PLAN PRESCRIBED DOSE PER FRACTION: 2.5 GY
RAD ONC ARIA PLAN PRIMARY REFERENCE POINT: NORMAL
RAD ONC ARIA PLAN TOTAL FRACTIONS PRESCRIBED: 28
RAD ONC ARIA PLAN TOTAL PRESCRIBED DOSE: 7000 CGY
RAD ONC ARIA REFERENCE POINT DOSAGE GIVEN TO DATE: 57.5 GY
RAD ONC ARIA REFERENCE POINT ID: NORMAL
RAD ONC ARIA REFERENCE POINT SESSION DOSAGE GIVEN: 2.5 GY

## 2025-06-13 PROCEDURE — 77385 HB NTSTY MODUL RAD TX DLVR SMPL: CPT | Performed by: RADIOLOGY

## 2025-06-13 PROCEDURE — 77014 CHG CT GUIDANCE RADIATION THERAPY FLDS PLACEMENT: CPT | Performed by: RADIOLOGY

## 2025-06-16 ENCOUNTER — APPOINTMENT (OUTPATIENT)
Dept: RADIATION ONCOLOGY | Facility: CLINIC | Age: 70
End: 2025-06-16
Attending: RADIOLOGY
Payer: MEDICARE

## 2025-06-16 ENCOUNTER — APPOINTMENT (OUTPATIENT)
Dept: RADIATION ONCOLOGY | Facility: CLINIC | Age: 70
End: 2025-06-16
Payer: MEDICARE

## 2025-06-16 LAB
RAD ONC ARIA COURSE FIRST TREATMENT DATE: NORMAL
RAD ONC ARIA COURSE ID: NORMAL
RAD ONC ARIA COURSE INTENT: NORMAL
RAD ONC ARIA COURSE LAST TREATMENT DATE: NORMAL
RAD ONC ARIA COURSE SESSION NUMBER: 24
RAD ONC ARIA COURSE TREATMENT ELAPSED DAYS: 35
RAD ONC ARIA PLAN FRACTIONS TREATED TO DATE: 24
RAD ONC ARIA PLAN ID: NORMAL
RAD ONC ARIA PLAN NAME: NORMAL
RAD ONC ARIA PLAN PRESCRIBED DOSE PER FRACTION: 2.5 GY
RAD ONC ARIA PLAN PRIMARY REFERENCE POINT: NORMAL
RAD ONC ARIA PLAN TOTAL FRACTIONS PRESCRIBED: 28
RAD ONC ARIA PLAN TOTAL PRESCRIBED DOSE: 7000 CGY
RAD ONC ARIA REFERENCE POINT DOSAGE GIVEN TO DATE: 60 GY
RAD ONC ARIA REFERENCE POINT ID: NORMAL
RAD ONC ARIA REFERENCE POINT SESSION DOSAGE GIVEN: 2.5 GY

## 2025-06-16 PROCEDURE — 77385 HB NTSTY MODUL RAD TX DLVR SMPL: CPT | Performed by: INTERNAL MEDICINE

## 2025-06-16 PROCEDURE — 77014 CHG CT GUIDANCE RADIATION THERAPY FLDS PLACEMENT: CPT | Performed by: INTERNAL MEDICINE

## 2025-06-17 ENCOUNTER — APPOINTMENT (OUTPATIENT)
Dept: RADIATION ONCOLOGY | Facility: CLINIC | Age: 70
End: 2025-06-17
Attending: RADIOLOGY
Payer: MEDICARE

## 2025-06-17 LAB
RAD ONC ARIA COURSE FIRST TREATMENT DATE: NORMAL
RAD ONC ARIA COURSE ID: NORMAL
RAD ONC ARIA COURSE INTENT: NORMAL
RAD ONC ARIA COURSE LAST TREATMENT DATE: NORMAL
RAD ONC ARIA COURSE SESSION NUMBER: 25
RAD ONC ARIA COURSE TREATMENT ELAPSED DAYS: 36
RAD ONC ARIA PLAN FRACTIONS TREATED TO DATE: 25
RAD ONC ARIA PLAN ID: NORMAL
RAD ONC ARIA PLAN NAME: NORMAL
RAD ONC ARIA PLAN PRESCRIBED DOSE PER FRACTION: 2.5 GY
RAD ONC ARIA PLAN PRIMARY REFERENCE POINT: NORMAL
RAD ONC ARIA PLAN TOTAL FRACTIONS PRESCRIBED: 28
RAD ONC ARIA PLAN TOTAL PRESCRIBED DOSE: 7000 CGY
RAD ONC ARIA REFERENCE POINT DOSAGE GIVEN TO DATE: 62.5 GY
RAD ONC ARIA REFERENCE POINT ID: NORMAL
RAD ONC ARIA REFERENCE POINT SESSION DOSAGE GIVEN: 2.5 GY

## 2025-06-17 PROCEDURE — 77336 RADIATION PHYSICS CONSULT: CPT | Performed by: RADIOLOGY

## 2025-06-17 PROCEDURE — 77014 CHG CT GUIDANCE RADIATION THERAPY FLDS PLACEMENT: CPT | Performed by: RADIOLOGY

## 2025-06-17 PROCEDURE — 77385 HB NTSTY MODUL RAD TX DLVR SMPL: CPT | Performed by: RADIOLOGY

## 2025-06-18 ENCOUNTER — APPOINTMENT (OUTPATIENT)
Dept: RADIATION ONCOLOGY | Facility: CLINIC | Age: 70
End: 2025-06-18
Attending: RADIOLOGY
Payer: MEDICARE

## 2025-06-18 LAB
RAD ONC ARIA COURSE FIRST TREATMENT DATE: NORMAL
RAD ONC ARIA COURSE ID: NORMAL
RAD ONC ARIA COURSE INTENT: NORMAL
RAD ONC ARIA COURSE LAST TREATMENT DATE: NORMAL
RAD ONC ARIA COURSE SESSION NUMBER: 26
RAD ONC ARIA COURSE TREATMENT ELAPSED DAYS: 37
RAD ONC ARIA PLAN FRACTIONS TREATED TO DATE: 26
RAD ONC ARIA PLAN ID: NORMAL
RAD ONC ARIA PLAN NAME: NORMAL
RAD ONC ARIA PLAN PRESCRIBED DOSE PER FRACTION: 2.5 GY
RAD ONC ARIA PLAN PRIMARY REFERENCE POINT: NORMAL
RAD ONC ARIA PLAN TOTAL FRACTIONS PRESCRIBED: 28
RAD ONC ARIA PLAN TOTAL PRESCRIBED DOSE: 7000 CGY
RAD ONC ARIA REFERENCE POINT DOSAGE GIVEN TO DATE: 65 GY
RAD ONC ARIA REFERENCE POINT ID: NORMAL
RAD ONC ARIA REFERENCE POINT SESSION DOSAGE GIVEN: 2.5 GY

## 2025-06-18 PROCEDURE — 77014 CHG CT GUIDANCE RADIATION THERAPY FLDS PLACEMENT: CPT | Performed by: STUDENT IN AN ORGANIZED HEALTH CARE EDUCATION/TRAINING PROGRAM

## 2025-06-18 PROCEDURE — 77385 HB NTSTY MODUL RAD TX DLVR SMPL: CPT | Performed by: STUDENT IN AN ORGANIZED HEALTH CARE EDUCATION/TRAINING PROGRAM

## 2025-06-19 ENCOUNTER — APPOINTMENT (OUTPATIENT)
Dept: RADIATION ONCOLOGY | Facility: CLINIC | Age: 70
End: 2025-06-19
Attending: RADIOLOGY
Payer: MEDICARE

## 2025-06-19 ENCOUNTER — PATIENT OUTREACH (OUTPATIENT)
Dept: HEMATOLOGY ONCOLOGY | Facility: CLINIC | Age: 70
End: 2025-06-19

## 2025-06-19 LAB
RAD ONC ARIA COURSE FIRST TREATMENT DATE: NORMAL
RAD ONC ARIA COURSE ID: NORMAL
RAD ONC ARIA COURSE INTENT: NORMAL
RAD ONC ARIA COURSE LAST TREATMENT DATE: NORMAL
RAD ONC ARIA COURSE SESSION NUMBER: 27
RAD ONC ARIA COURSE TREATMENT ELAPSED DAYS: 38
RAD ONC ARIA PLAN FRACTIONS TREATED TO DATE: 27
RAD ONC ARIA PLAN ID: NORMAL
RAD ONC ARIA PLAN NAME: NORMAL
RAD ONC ARIA PLAN PRESCRIBED DOSE PER FRACTION: 2.5 GY
RAD ONC ARIA PLAN PRIMARY REFERENCE POINT: NORMAL
RAD ONC ARIA PLAN TOTAL FRACTIONS PRESCRIBED: 28
RAD ONC ARIA PLAN TOTAL PRESCRIBED DOSE: 7000 CGY
RAD ONC ARIA REFERENCE POINT DOSAGE GIVEN TO DATE: 67.5 GY
RAD ONC ARIA REFERENCE POINT ID: NORMAL
RAD ONC ARIA REFERENCE POINT SESSION DOSAGE GIVEN: 2.5 GY

## 2025-06-19 PROCEDURE — 77385 HB NTSTY MODUL RAD TX DLVR SMPL: CPT | Performed by: STUDENT IN AN ORGANIZED HEALTH CARE EDUCATION/TRAINING PROGRAM

## 2025-06-19 PROCEDURE — 77014 CHG CT GUIDANCE RADIATION THERAPY FLDS PLACEMENT: CPT | Performed by: STUDENT IN AN ORGANIZED HEALTH CARE EDUCATION/TRAINING PROGRAM

## 2025-06-19 NOTE — PROGRESS NOTES
Pts wife called stating Jose did not received a message about being set up  for  [facility]  today.  I confirmed via round trip he is scheduled for transportation  today to his radiation appointment. She was thankful for the assistance

## 2025-06-20 ENCOUNTER — APPOINTMENT (OUTPATIENT)
Dept: RADIATION ONCOLOGY | Facility: CLINIC | Age: 70
End: 2025-06-20
Attending: RADIOLOGY
Payer: MEDICARE

## 2025-06-20 ENCOUNTER — PATIENT OUTREACH (OUTPATIENT)
Dept: HEMATOLOGY ONCOLOGY | Facility: CLINIC | Age: 70
End: 2025-06-20

## 2025-06-20 LAB
RAD ONC ARIA COURSE FIRST TREATMENT DATE: NORMAL
RAD ONC ARIA COURSE ID: NORMAL
RAD ONC ARIA COURSE INTENT: NORMAL
RAD ONC ARIA COURSE LAST TREATMENT DATE: NORMAL
RAD ONC ARIA COURSE SESSION NUMBER: 28
RAD ONC ARIA COURSE TREATMENT ELAPSED DAYS: 39
RAD ONC ARIA PLAN FRACTIONS TREATED TO DATE: 28
RAD ONC ARIA PLAN ID: NORMAL
RAD ONC ARIA PLAN NAME: NORMAL
RAD ONC ARIA PLAN PRESCRIBED DOSE PER FRACTION: 2.5 GY
RAD ONC ARIA PLAN PRIMARY REFERENCE POINT: NORMAL
RAD ONC ARIA PLAN TOTAL FRACTIONS PRESCRIBED: 28
RAD ONC ARIA PLAN TOTAL PRESCRIBED DOSE: 7000 CGY
RAD ONC ARIA REFERENCE POINT DOSAGE GIVEN TO DATE: 70 GY
RAD ONC ARIA REFERENCE POINT ID: NORMAL
RAD ONC ARIA REFERENCE POINT SESSION DOSAGE GIVEN: 2.5 GY

## 2025-06-20 PROCEDURE — 77385 HB NTSTY MODUL RAD TX DLVR SMPL: CPT | Performed by: RADIOLOGY

## 2025-06-20 PROCEDURE — 77336 RADIATION PHYSICS CONSULT: CPT | Performed by: RADIOLOGY

## 2025-06-20 PROCEDURE — 77014 CHG CT GUIDANCE RADIATION THERAPY FLDS PLACEMENT: CPT | Performed by: RADIOLOGY

## 2025-06-20 NOTE — PROGRESS NOTES
Pt called to confirm he is scheduled for transportation today to his radiation appointment.  I confirmed via round trip he is scheduled for  transportation today. He was thankful for the assistance

## 2025-06-30 ENCOUNTER — OFFICE VISIT (OUTPATIENT)
Dept: UROLOGY | Facility: CLINIC | Age: 70
End: 2025-06-30
Payer: MEDICARE

## 2025-06-30 ENCOUNTER — APPOINTMENT (OUTPATIENT)
Dept: RADIOLOGY | Facility: CLINIC | Age: 70
End: 2025-06-30
Attending: INTERNAL MEDICINE
Payer: MEDICARE

## 2025-06-30 ENCOUNTER — TELEPHONE (OUTPATIENT)
Age: 70
End: 2025-06-30

## 2025-06-30 VITALS
DIASTOLIC BLOOD PRESSURE: 58 MMHG | BODY MASS INDEX: 30.38 KG/M2 | WEIGHT: 217 LBS | HEIGHT: 71 IN | TEMPERATURE: 98 F | SYSTOLIC BLOOD PRESSURE: 112 MMHG | OXYGEN SATURATION: 95 % | HEART RATE: 73 BPM

## 2025-06-30 DIAGNOSIS — M79.641 BILATERAL HAND PAIN: ICD-10-CM

## 2025-06-30 DIAGNOSIS — N52.9 ERECTILE DYSFUNCTION, UNSPECIFIED ERECTILE DYSFUNCTION TYPE: ICD-10-CM

## 2025-06-30 DIAGNOSIS — M79.642 BILATERAL HAND PAIN: ICD-10-CM

## 2025-06-30 DIAGNOSIS — M54.2 NECK PAIN: ICD-10-CM

## 2025-06-30 DIAGNOSIS — C61 PROSTATE CANCER (HCC): Primary | ICD-10-CM

## 2025-06-30 DIAGNOSIS — R35.1 NOCTURIA MORE THAN TWICE PER NIGHT: ICD-10-CM

## 2025-06-30 PROCEDURE — 72040 X-RAY EXAM NECK SPINE 2-3 VW: CPT

## 2025-06-30 PROCEDURE — 99213 OFFICE O/P EST LOW 20 MIN: CPT | Performed by: UROLOGY

## 2025-06-30 RX ORDER — TADALAFIL 5 MG/1
5 TABLET ORAL DAILY
Qty: 90 TABLET | Refills: 3 | Status: SHIPPED | OUTPATIENT
Start: 2025-06-30 | End: 2025-07-07 | Stop reason: SDUPTHER

## 2025-06-30 NOTE — PROGRESS NOTES
UROLOGY PROGRESS NOTE   [facility]  [address]  [address]  [place] [address]  [phone]  Fax:[phone]  [email or web address]      NAME: Jose Becker  AGE: 69 y.o. SEX: male  : 1955   MRN: [id number]    DATE: 2025  TIME: 2:30 PM    Assessment and Plan:    1. Prostate cancer (HCC)  2. Erectile dysfunction, unspecified erectile dysfunction type  -     tadalafil (CIALIS) 5 MG tablet; Take 1 tablet (5 mg total) by mouth in the morning  3. Nocturia more than twice per night     Check PSA in 6 months and f/u with that.    Stop Tamsulosin and see how he does. Cialis refilled continue with daily 5 mg but take 20 mg day before anticipated intercourse then skip the next 2 days dosing.               Chief Complaint     Chief Complaint   Patient presents with    Follow-up     Six months, Prostate cancer        History of Present Illness   Prostate cancer, -status post MR fusion transperineal prostate biopsy by Dr. Duvall 2024-pathology shows Cat 3+4 equal 7 with perineural invasion base and are type left posterior peripheral zone, and [place] 3+3 equal 6 left posterior medial.  All else  is benign.  MRI shows category 4 left apical medial peripheral zone.  23 cc gland.  PSA 7.0. Decipher showed intermediate risk, and with the fact that he had Acinar CA, he decided upon treatment which I recommended which was SBRT- completed 2025, 28 fractions. Had markers and space OAR before. Had exacerbation of arthralgias with this. Had to double his Gabapentin due to this.    Currently on Tamsulosin from Dr Matson- voiding 4-5 x per day, nocturia 3 x. Takes it at bedtime. Told to try stopping it.    ED- worse since SBRT. On daily 5 mg Cialis. Covid was original cause of ED according to him 4 years ago.Told him to try taking 4 5mg pills Thursday then skip his doses until .      The following portions of the patient's history were reviewed and updated as  "appropriate: allergies, current medications, past family history, past medical history, past social history, past surgical history and problem list.  Past Medical History[1]  Past Surgical History[2]  shoulder  Review of Systems   Review of Systems    Active Problem List   Problem List[3]    Objective   /58 (BP Location: Left arm, Patient Position: Sitting, Cuff Size: Adult)   Pulse 73   Temp 98 °F (36.7 °C) (Temporal)   Ht 5' 11\" (1.803 m)   Wt 98.4 kg (217 lb)   SpO2 95%   BMI 30.27 kg/m²     Physical Exam  Vitals reviewed.   Constitutional:       Appearance: Normal appearance. He is normal weight.   HENT:      Head: Normocephalic and atraumatic.     Eyes:      Extraocular Movements: Extraocular movements intact.     Pulmonary:      Effort: Pulmonary effort is normal.     Musculoskeletal:         General: Normal range of motion.      Cervical back: Normal range of motion.     Skin:     Coloration: Skin is not jaundiced or pale.     Neurological:      General: No focal deficit present.      Mental Status: He is alert and oriented to person, place, and time. Mental status is at baseline.     Psychiatric:         Mood and Affect: Mood normal.         Behavior: Behavior normal.         Thought Content: Thought content normal.         Judgment: Judgment normal.             Current Medications   Current Medications[4]        Hong Ruiz MD                [1]   Past Medical History:  Diagnosis Date    Allergic rhinitis     Anesthesia     \"one time took longer to wake up\"    Arthritis     Asthma     Coronary artery disease     [facility] Dr Bautista    COVID-19 12/2020    Disease of thyroid gland     Heart attack (HCC)     History of pneumonia     Hyperlipidemia     Hypertension     Hypertensive disorder 07/15/2013    Myocardial infarction (HCC) 2/2012 2012 / 2018    Prostate cancer (HCC) 12/23/24    Rupture, spleen     \"as a child fell on tree stump\"    S/P insertion of hypoglossal nerve stimulator 06/27/2024    " Sleep apnea     has Inspire implanted    Tinnitus     Wears glasses     readers    Wears partial dentures     upper   [2]   Past Surgical History:  Procedure Laterality Date    COLONOSCOPY  8/2024    CORONARY ARTERY BYPASS GRAFT  2012    2 stents..2018..stent / 2012 3 vessel bypass    EXAMINATION UNDER ANESTHESIA N/A 09/24/2020    Procedure: DRUG INDUCED SLEEP ENDOSCOPY;  Surgeon: Nasir Darling MD;  Location: [facility];  Service: ENT    JOINT REPLACEMENT      AZ OPEN IMPLANTATION CRANIAL NERVE SCOOBY & PULSE GEN N/A 09/08/2021    Procedure: INSERTION UPPER AIRWAY STIMULATOR, INSPIRE IMPLANT;  Surgeon: Nasir Darling MD;  Location: [facility];  Service: ENT    AZ PLMT INTERSTITIAL DEV RADIAT TX PROSTATE 1/MULT N/A 04/16/2025    Procedure: INSERTION OF FIDUCIAL MARKER (TRANSRECTAL ULTRASOUND GUIDANCE), SPACEOAR;  Surgeon: Sebastien Sánchez DO;  Location[facility];  Service: Urology    AZ PROSTATE NEEDLE BIOPSY ANY APPROACH N/A 12/11/2024    Procedure: TRANSPERINEAL MRI FUSION BX PROSTATE;  Surgeon: Salas Duvall MD;  Location[facility];  Service: Urology    REPLACEMENT TOTAL KNEE BILATERAL      ROTATOR CUFF REPAIR     [3]   Patient Active Problem List  Diagnosis    STEMI (ST elevation myocardial infarction) (HCC)    MILDRED (obstructive sleep apnea)    Smoking    History of coronary artery bypass graft    Status post aorto-coronary artery bypass graft    Obesity    Allergic rhinitis    CAD (coronary artery disease), native coronary artery    Cervical radiculopathy    Chronic back pain    Chronic low back pain    Closed fracture of shoulder    Disorder of lumbar spine    Hypertensive disorder    Macular degeneration    Mixed hyperlipidemia    Moderate persistent asthma without complication    Preop pulmonary/respiratory exam    Rotator cuff injury    S/P PTCA (percutaneous transluminal coronary angioplasty)    PLMD (periodic limb movement disorder)    Snoring    Anorgasmia of male    Erectile dysfunction     S/P insertion of hypoglossal nerve stimulator    Restless leg syndrome    Elevated PSA    Prostate cancer (HCC)   [4]   Current Outpatient Medications:     aspirin 81 MG tablet, Take 1 tablet by mouth in the morning., Disp: , Rfl:     Cholecalciferol 50 MCG (2000 UT) CAPS, in the morning., Disp: , Rfl:     clopidogrel (PLAVIX) 75 mg tablet, , Disp: , Rfl:     gabapentin (NEURONTIN) 300 mg capsule, Take 300 mg by mouth 3 (three) times a day Taking am, Disp: , Rfl:     levothyroxine 100 mcg tablet, Take 100 mcg by mouth daily, Disp: , Rfl:     metoprolol succinate (TOPROL-XL) 25 mg 24 hr tablet, Take 12.5 mg by mouth daily, Disp: , Rfl:     mometasone (ELOCON) 0.1 % lotion, APPLY TOPICALLY AS NEEDED (2 DROPS EACH EAR CANAL ONCE DAILY AS NEEDED) 2 DROPS TO EACH EAR CANAL AS NEEDED, Disp: 60 mL, Rfl: 3    montelukast (SINGULAIR) 10 mg tablet, Take 10 mg by mouth daily at bedtime, Disp: , Rfl:     Multiple Vitamins-Minerals (PRESERVISION AREDS 2 PO), Take by mouth in the morning and in the evening., Disp: , Rfl:     nitroglycerin (NITROSTAT) 0.4 mg SL tablet, Place 0.4 mg under the tongue every 5 (five) minutes as needed, Disp: , Rfl:     Omega-3 Fatty Acids (OMEGA-3 FISH OIL PO), Take by mouth in the morning, Disp: , Rfl:     omeprazole (PriLOSEC) 20 mg delayed release capsule, Take 20 mg by mouth daily, Disp: , Rfl:     Praluent 75 MG/ML SOAJ, Inject under the skin every 14 (fourteen) days Takes on Sundays, Disp: , Rfl:     tadalafil (CIALIS) 5 MG tablet, Take 1 tablet (5 mg total) by mouth in the morning, Disp: 90 tablet, Rfl: 3    tamsulosin (FLOMAX) 0.4 mg, Take 1 capsule (0.4 mg total) by mouth daily with dinner, Disp: 30 capsule, Rfl: 3

## 2025-06-30 NOTE — TELEPHONE ENCOUNTER
PA for TADALAFIL 5 MG  DENIED    Reason:(Screenshot if applicable)  MEDICARE DOES NOT COVER FOR DX OF ERECTILE DYSFUNCTION    PT CAN USE GOOD RX  PHARMACY AWARE OF DENIAL

## 2025-06-30 NOTE — PATIENT INSTRUCTIONS
Stop taking the tamsulosin and see how that goes.  If you do not notice any effect stay off of it.    Take the Cialis 5 mg daily and if you want to perform sexually on Friday take 4 of the 5 mg tablets on Thursday.  Then do not take the drug again until Sunday morning.

## 2025-07-07 DIAGNOSIS — N52.9 ERECTILE DYSFUNCTION, UNSPECIFIED ERECTILE DYSFUNCTION TYPE: ICD-10-CM

## 2025-07-07 RX ORDER — TADALAFIL 5 MG/1
5 TABLET ORAL DAILY
Qty: 90 TABLET | Refills: 3 | Status: SHIPPED | OUTPATIENT
Start: 2025-07-07

## 2025-07-10 ENCOUNTER — OFFICE VISIT (OUTPATIENT)
Age: 70
End: 2025-07-10
Payer: MEDICARE

## 2025-07-10 VITALS
HEIGHT: 71 IN | RESPIRATION RATE: 16 BRPM | SYSTOLIC BLOOD PRESSURE: 111 MMHG | WEIGHT: 223 LBS | OXYGEN SATURATION: 94 % | HEART RATE: 66 BPM | DIASTOLIC BLOOD PRESSURE: 64 MMHG | TEMPERATURE: 98.3 F | BODY MASS INDEX: 31.22 KG/M2

## 2025-07-10 DIAGNOSIS — G47.33 OSA (OBSTRUCTIVE SLEEP APNEA): Primary | ICD-10-CM

## 2025-07-10 DIAGNOSIS — Z96.82 S/P INSERTION OF HYPOGLOSSAL NERVE STIMULATOR: ICD-10-CM

## 2025-07-10 PROCEDURE — 99214 OFFICE O/P EST MOD 30 MIN: CPT | Performed by: STUDENT IN AN ORGANIZED HEALTH CARE EDUCATION/TRAINING PROGRAM

## 2025-07-10 PROCEDURE — G2211 COMPLEX E/M VISIT ADD ON: HCPCS | Performed by: STUDENT IN AN ORGANIZED HEALTH CARE EDUCATION/TRAINING PROGRAM

## 2025-07-24 ENCOUNTER — TELEPHONE (OUTPATIENT)
Dept: RADIATION ONCOLOGY | Facility: CLINIC | Age: 70
End: 2025-07-24

## 2025-07-25 ENCOUNTER — APPOINTMENT (OUTPATIENT)
Dept: LAB | Facility: CLINIC | Age: 70
End: 2025-07-25
Payer: MEDICARE

## 2025-07-25 DIAGNOSIS — R97.20 ELEVATED PSA: ICD-10-CM

## 2025-07-25 DIAGNOSIS — E55.9 VITAMIN D DEFICIENCY: ICD-10-CM

## 2025-07-25 DIAGNOSIS — I50.9 CONGESTIVE HEART FAILURE, UNSPECIFIED HF CHRONICITY, UNSPECIFIED HEART FAILURE TYPE (HCC): ICD-10-CM

## 2025-07-25 DIAGNOSIS — D64.9 ANEMIA, UNSPECIFIED TYPE: ICD-10-CM

## 2025-07-25 DIAGNOSIS — R63.4 WEIGHT LOSS: ICD-10-CM

## 2025-07-25 DIAGNOSIS — C61 PROSTATE CANCER (HCC): ICD-10-CM

## 2025-07-25 DIAGNOSIS — I10 HYPERTENSION, UNSPECIFIED TYPE: ICD-10-CM

## 2025-07-25 LAB
EST. AVERAGE GLUCOSE BLD GHB EST-MCNC: 103 MG/DL
HBA1C MFR BLD: 5.2 %
PSA SERPL-MCNC: 1.43 NG/ML (ref 0–4)
T4 FREE SERPL-MCNC: 1.09 NG/DL (ref 0.61–1.12)

## 2025-07-25 PROCEDURE — 36415 COLL VENOUS BLD VENIPUNCTURE: CPT

## 2025-07-25 PROCEDURE — 84153 ASSAY OF PSA TOTAL: CPT

## 2025-07-25 PROCEDURE — 83036 HEMOGLOBIN GLYCOSYLATED A1C: CPT

## 2025-07-25 PROCEDURE — 84439 ASSAY OF FREE THYROXINE: CPT

## 2025-07-28 ENCOUNTER — RESULTS FOLLOW-UP (OUTPATIENT)
Age: 70
End: 2025-07-28

## 2025-07-30 ENCOUNTER — SOCIAL WORK (OUTPATIENT)
Dept: PALLIATIVE MEDICINE | Facility: CLINIC | Age: 70
End: 2025-07-30
Payer: MEDICARE

## 2025-07-30 ENCOUNTER — CONSULT (OUTPATIENT)
Dept: PALLIATIVE MEDICINE | Facility: CLINIC | Age: 70
End: 2025-07-30
Payer: MEDICARE

## 2025-07-30 VITALS
BODY MASS INDEX: 30.8 KG/M2 | WEIGHT: 220 LBS | SYSTOLIC BLOOD PRESSURE: 108 MMHG | HEART RATE: 84 BPM | DIASTOLIC BLOOD PRESSURE: 60 MMHG | TEMPERATURE: 98 F | HEIGHT: 71 IN | OXYGEN SATURATION: 98 %

## 2025-07-30 DIAGNOSIS — Z79.899 MEDICAL MARIJUANA USE: ICD-10-CM

## 2025-07-30 DIAGNOSIS — Z51.5 PALLIATIVE CARE BY SPECIALIST: ICD-10-CM

## 2025-07-30 DIAGNOSIS — M54.12 CERVICAL RADICULOPATHY: ICD-10-CM

## 2025-07-30 DIAGNOSIS — Z71.89 COUNSELING REGARDING ADVANCE CARE PLANNING AND GOALS OF CARE: Primary | ICD-10-CM

## 2025-07-30 DIAGNOSIS — Z71.89 COUNSELING REGARDING ADVANCE CARE PLANNING AND GOALS OF CARE: ICD-10-CM

## 2025-07-30 DIAGNOSIS — C61 PROSTATE CANCER (HCC): Primary | ICD-10-CM

## 2025-07-30 PROCEDURE — 99497 ADVNCD CARE PLAN 30 MIN: CPT | Performed by: STUDENT IN AN ORGANIZED HEALTH CARE EDUCATION/TRAINING PROGRAM

## 2025-07-30 PROCEDURE — NC001 PR NO CHARGE

## 2025-07-30 PROCEDURE — 99205 OFFICE O/P NEW HI 60 MIN: CPT | Performed by: STUDENT IN AN ORGANIZED HEALTH CARE EDUCATION/TRAINING PROGRAM

## 2025-07-30 RX ORDER — SENNOSIDES 8.6 MG
1300 CAPSULE ORAL 3 TIMES DAILY
Qty: 180 TABLET | Refills: 3 | Status: SHIPPED | OUTPATIENT
Start: 2025-07-30

## 2025-08-07 ENCOUNTER — HOSPITAL ENCOUNTER (OUTPATIENT)
Dept: SLEEP CENTER | Facility: CLINIC | Age: 70
Discharge: HOME | End: 2025-08-07
Attending: STUDENT IN AN ORGANIZED HEALTH CARE EDUCATION/TRAINING PROGRAM
Payer: MEDICARE

## 2025-08-07 DIAGNOSIS — Z96.82 S/P INSERTION OF HYPOGLOSSAL NERVE STIMULATOR: ICD-10-CM

## 2025-08-07 DIAGNOSIS — G47.33 OSA (OBSTRUCTIVE SLEEP APNEA): ICD-10-CM

## 2025-08-07 PROCEDURE — 95976 ALYS SMPL CN NPGT PRGRMG: CPT

## 2025-08-07 PROCEDURE — 95810 POLYSOM 6/> YRS 4/> PARAM: CPT | Performed by: STUDENT IN AN ORGANIZED HEALTH CARE EDUCATION/TRAINING PROGRAM

## 2025-08-07 PROCEDURE — 95810 POLYSOM 6/> YRS 4/> PARAM: CPT

## 2025-08-11 ENCOUNTER — RESULTS FOLLOW-UP (OUTPATIENT)
Dept: SLEEP CENTER | Facility: CLINIC | Age: 70
End: 2025-08-11

## (undated) DEVICE — UTILITY MARKER,BLACK WITH LABELS: Brand: DEVON

## (undated) DEVICE — SYRINGE,TOOMEY,IRRIGATION,70CC,STERILE: Brand: MEDLINE

## (undated) DEVICE — GLOVE SRG BIOGEL 7.5

## (undated) DEVICE — GAUZE SPONGES,16 PLY: Brand: CURITY

## (undated) DEVICE — SPONGE 4 X 4 XRAY 16 PLY STRL LF RFD

## (undated) DEVICE — DECANTER: Brand: UNBRANDED

## (undated) DEVICE — CHLORAPREP HI-LITE 26ML ORANGE

## (undated) DEVICE — 3M™ TEGADERM™ TRANSPARENT FILM DRESSING FRAME STYLE, 1624W, 2-3/8 IN X 2-3/4 IN (6 CM X 7 CM), 100/CT 4CT/CASE: Brand: 3M™ TEGADERM™

## (undated) DEVICE — INTENDED FOR TISSUE SEPARATION, AND OTHER PROCEDURES THAT REQUIRE A SHARP SURGICAL BLADE TO PUNCTURE OR CUT.: Brand: BARD-PARKER SAFETY BLADES SIZE 15, STERILE

## (undated) DEVICE — VESSEL LOOPS X-RAY DETECTABLE: Brand: DEROYAL

## (undated) DEVICE — DRAPE SHEET THREE QUARTER

## (undated) DEVICE — STANDARD SURGICAL GOWN, L: Brand: CONVERTORS

## (undated) DEVICE — STERILE (2 X 30CM) LATEX COVER: Brand: PROCOVERS™

## (undated) DEVICE — 3M™ TRANSPORE™ WHITE SURGICAL TAPE 1534-1, 1 INCH X 10 YARD (2,5CM X 9,1M), 12 ROLLS/CARTON 10 CARTONS/CASE: Brand: 3M™ TRANSPORE™

## (undated) DEVICE — SYRINGE 10ML LL

## (undated) DEVICE — NEEDLE 18 G X 1 1/2

## (undated) DEVICE — SUT SILK PERMA-HAND 3-0 18IN A182H

## (undated) DEVICE — NEEDLE 25G X 1 1/2

## (undated) DEVICE — GLOVE SRG BIOGEL 7

## (undated) DEVICE — SUT MONOCRYL 4-0 PS-2 27 IN Y426H

## (undated) DEVICE — BULB SYRINGE,IRRIGATION WITH PROTECTIVE CAP: Brand: DOVER

## (undated) DEVICE — RENTAL PROBE ULTRASOUND DROP IN BK5000

## (undated) DEVICE — PROBE 8225401 5PK SD-SD BIPOL STIM ROHS

## (undated) DEVICE — NEEDLE BLUNT 18 G X 1 1/2 W FILTER

## (undated) DEVICE — WET SKIN PREP TRAY: Brand: MEDLINE INDUSTRIES, INC.

## (undated) DEVICE — IV CATH INTROCAN 18G X 1 1/4 SAFETY

## (undated) DEVICE — AMBU ASCOPE 4 RHINOLARYNGO SLIM SINGLE-USE, FLEXIBLE RHINOLARYNGOSCOPE STERILE FROM PACKAGE. INSERTION CORD DIAMETER 3.0 MM, LENGHT OF 300 MM. AND A 130-DEGREE BENDING ANGLE. MINIMUM PURCHASE 5 PCS = 1 BOX: Brand: ASCOPE™ 4 RHINOLARYNGO SLIM

## (undated) DEVICE — COVER PROBE ECLIPSE

## (undated) DEVICE — SYSTEM TRANSPERINEAL ACCESS PRECISIONPOINT

## (undated) DEVICE — ELECTRODE BLADE MOD E-Z CLEAN  2.75IN 7CM -0012AM

## (undated) DEVICE — TELFA NON-ADHERENT ABSORBENT DRESSING: Brand: TELFA

## (undated) DEVICE — RAZOR STERILE

## (undated) DEVICE — SKIN MARKER DUAL TIP WITH RULER CAP, FLEXIBLE RULER AND LABELS: Brand: DEVON

## (undated) DEVICE — FORMALIN PRE-FILLED 10 PCT PROSTATE BIOPSY

## (undated) DEVICE — SPECIMEN CONTAINER STERILE PEEL PACK

## (undated) DEVICE — 3M™ IOBAN™ 2 ANTIMICROBIAL INCISE DRAPE 6650EZ: Brand: IOBAN™ 2

## (undated) DEVICE — ULTRASOUND GEL STERILE FOIL PK

## (undated) DEVICE — GLOVE INDICATOR PI UNDERGLOVE SZ 7.5 BLUE

## (undated) DEVICE — HOLDER PROBE URONAV BK8848

## (undated) DEVICE — PROVE COVER: Brand: UNBRANDED

## (undated) DEVICE — PREP PAD BNS: Brand: CONVERTORS

## (undated) DEVICE — DRESSING MEPORE FILM ADHESIVE 4 X 5IN

## (undated) DEVICE — 3M™ TEGADERM™ TRANSPARENT FILM DRESSING FRAME STYLE, 1626W, 4 IN X 4-3/4 IN (10 CM X 12 CM), 50/CT 4CT/CASE: Brand: 3M™ TEGADERM™

## (undated) DEVICE — TIBURON SPLIT SHEET: Brand: CONVERTORS

## (undated) DEVICE — SPONGE CHERRY 1/2IN

## (undated) DEVICE — 60 ML SYRINGE,TOOMEY TYPE: Brand: MONOJECT

## (undated) DEVICE — MASTISOL LIQ ADHESIVE 2/3ML

## (undated) DEVICE — MAX-CORE® DISPOSABLE CORE BIOPSY INSTRUMENT, 18G X 25CM: Brand: MAX-CORE

## (undated) DEVICE — SUT SILK 2-0 SH 30 IN K833H

## (undated) DEVICE — GAUZE SPONGES,USP TYPE VII GAUZE, 12 PLY: Brand: CURITY

## (undated) DEVICE — LUBRICANT JELLY SURGILUBE TUBE 2OZ FLIP TOP

## (undated) DEVICE — SUT SILK 3-0 SH CR/8 18 IN C013D

## (undated) DEVICE — 3M™ STERI-STRIP™ REINFORCED ADHESIVE SKIN CLOSURES, R1547, 1/2 IN X 4 IN (12 MM X 100 MM), 6 STRIPS/ENVELOPE: Brand: 3M™ STERI-STRIP™

## (undated) DEVICE — SCD SEQUENTIAL COMPRESSION COMFORT SLEEVE MEDIUM KNEE LENGTH: Brand: KENDALL SCD

## (undated) DEVICE — HEAVY DUTY TABLE COVER: Brand: CONVERTORS

## (undated) DEVICE — SUT VICRYL 3-0 SH 27 IN J416H

## (undated) DEVICE — CATHETER 8591-38 PASSER,38CM

## (undated) DEVICE — PACK UNIVERSAL NECK

## (undated) DEVICE — ELECTRODE 8227304 5PK PRASS PR 18MM ROHS

## (undated) DEVICE — VESSEL LOOP MAXI - RED

## (undated) DEVICE — ALCOHOL PREP, 2 PLY, LARGE, MADE IN USA, SATURATED WITH 70% ISOPROPYL ALCOHOL, FOR EXTERNAL USE ONLY: Brand: WEBCOL

## (undated) DEVICE — CUP MEDICINE 1OZ 5000/CS 50/PLT: Brand: MEDEGEN MEDICAL PRODUCTS, LLC

## (undated) DEVICE — REMOTE SLEEP INSPIRE

## (undated) DEVICE — NEEDLE 25GA X 1 IN SAFETY GLIDE

## (undated) DEVICE — Device

## (undated) DEVICE — BIPOLAR CORD DISP

## (undated) DEVICE — 10FR FRAZIER SUCTION HANDLE: Brand: CARDINAL HEALTH